# Patient Record
Sex: FEMALE | Race: WHITE | NOT HISPANIC OR LATINO | Employment: UNEMPLOYED | URBAN - METROPOLITAN AREA
[De-identification: names, ages, dates, MRNs, and addresses within clinical notes are randomized per-mention and may not be internally consistent; named-entity substitution may affect disease eponyms.]

---

## 2020-10-19 ENCOUNTER — TRANSFERRED RECORDS (OUTPATIENT)
Dept: MULTI SPECIALTY CLINIC | Facility: CLINIC | Age: 35
End: 2020-10-19

## 2020-10-19 LAB
ALT SERPL-CCNC: 23 IU/L (ref 8–45)
AST SERPL-CCNC: 24 IU/L (ref 2–40)
CHOLESTEROL (EXTERNAL): 208 MG/DL (ref 100–199)
CREATININE (EXTERNAL): 0.81 MG/DL (ref 0.57–1.11)
GFR ESTIMATED (EXTERNAL): >60 ML/MIN/1.73M2
GFR ESTIMATED (IF AFRICAN AMERICAN) (EXTERNAL): >60 ML/MIN/1.73M2
GLUCOSE (EXTERNAL): 92 MG/DL (ref 65–100)
HDLC SERPL-MCNC: 54 MG/DL
HPV ABSTRACT: NORMAL
LDL CHOLESTEROL (EXTERNAL): 135 MG/DL
NON HDL CHOLESTEROL (EXTERNAL): 154 MG/DL
PAP-ABSTRACT: NORMAL
POTASSIUM (EXTERNAL): 3.8 MMOL/L (ref 3.5–5)
TRIGLYCERIDES (EXTERNAL): 93 MG/DL
TSH SERPL-ACNC: 0.43 UIU/ML (ref 0.35–4.94)

## 2022-12-23 ENCOUNTER — OFFICE VISIT (OUTPATIENT)
Dept: FAMILY MEDICINE | Facility: CLINIC | Age: 37
End: 2022-12-23
Payer: COMMERCIAL

## 2022-12-23 VITALS
OXYGEN SATURATION: 99 % | WEIGHT: 146.3 LBS | HEIGHT: 67 IN | TEMPERATURE: 97.8 F | BODY MASS INDEX: 22.96 KG/M2 | DIASTOLIC BLOOD PRESSURE: 82 MMHG | HEART RATE: 59 BPM | SYSTOLIC BLOOD PRESSURE: 134 MMHG | RESPIRATION RATE: 14 BRPM

## 2022-12-23 DIAGNOSIS — Z13.1 SCREENING FOR DIABETES MELLITUS: ICD-10-CM

## 2022-12-23 DIAGNOSIS — Z13.220 SCREENING FOR HYPERLIPIDEMIA: ICD-10-CM

## 2022-12-23 DIAGNOSIS — Z00.00 ROUTINE GENERAL MEDICAL EXAMINATION AT A HEALTH CARE FACILITY: Primary | ICD-10-CM

## 2022-12-23 DIAGNOSIS — M35.00 SJOGREN'S SYNDROME, WITH UNSPECIFIED ORGAN INVOLVEMENT (H): ICD-10-CM

## 2022-12-23 DIAGNOSIS — E89.0 POSTSURGICAL HYPOTHYROIDISM: ICD-10-CM

## 2022-12-23 LAB
ALBUMIN SERPL-MCNC: 4.4 G/DL (ref 3.4–5)
ALP SERPL-CCNC: 49 U/L (ref 40–150)
ALT SERPL W P-5'-P-CCNC: 22 U/L (ref 0–50)
ANION GAP SERPL CALCULATED.3IONS-SCNC: 5 MMOL/L (ref 3–14)
AST SERPL W P-5'-P-CCNC: 18 U/L (ref 0–45)
BILIRUB SERPL-MCNC: 0.6 MG/DL (ref 0.2–1.3)
BUN SERPL-MCNC: 10 MG/DL (ref 7–30)
CALCIUM SERPL-MCNC: 9.8 MG/DL (ref 8.5–10.1)
CHLORIDE BLD-SCNC: 107 MMOL/L (ref 94–109)
CHOLEST SERPL-MCNC: 170 MG/DL
CO2 SERPL-SCNC: 30 MMOL/L (ref 20–32)
CREAT SERPL-MCNC: 0.7 MG/DL (ref 0.52–1.04)
FASTING STATUS PATIENT QL REPORTED: NO
GFR SERPL CREATININE-BSD FRML MDRD: >90 ML/MIN/1.73M2
GLUCOSE BLD-MCNC: 91 MG/DL (ref 70–99)
HDLC SERPL-MCNC: 65 MG/DL
LDLC SERPL CALC-MCNC: 86 MG/DL
NONHDLC SERPL-MCNC: 105 MG/DL
POTASSIUM BLD-SCNC: 3.7 MMOL/L (ref 3.4–5.3)
PROT SERPL-MCNC: 7.4 G/DL (ref 6.8–8.8)
SODIUM SERPL-SCNC: 142 MMOL/L (ref 133–144)
T4 FREE SERPL-MCNC: 1.24 NG/DL (ref 0.76–1.46)
TRIGL SERPL-MCNC: 97 MG/DL
TSH SERPL DL<=0.005 MIU/L-ACNC: 0.32 MU/L (ref 0.4–4)

## 2022-12-23 PROCEDURE — 84439 ASSAY OF FREE THYROXINE: CPT | Performed by: PHYSICIAN ASSISTANT

## 2022-12-23 PROCEDURE — 36415 COLL VENOUS BLD VENIPUNCTURE: CPT | Performed by: PHYSICIAN ASSISTANT

## 2022-12-23 PROCEDURE — 99385 PREV VISIT NEW AGE 18-39: CPT | Performed by: PHYSICIAN ASSISTANT

## 2022-12-23 PROCEDURE — 80061 LIPID PANEL: CPT | Performed by: PHYSICIAN ASSISTANT

## 2022-12-23 PROCEDURE — 84443 ASSAY THYROID STIM HORMONE: CPT | Performed by: PHYSICIAN ASSISTANT

## 2022-12-23 PROCEDURE — 80053 COMPREHEN METABOLIC PANEL: CPT | Performed by: PHYSICIAN ASSISTANT

## 2022-12-23 RX ORDER — MULTIVIT-MIN/IRON/FOLIC ACID/K 18-600-40
CAPSULE ORAL
COMMUNITY

## 2022-12-23 RX ORDER — LEVOTHYROXINE SODIUM 125 UG/1
125 TABLET ORAL DAILY
COMMUNITY
End: 2022-12-24 | Stop reason: DRUGHIGH

## 2022-12-23 ASSESSMENT — ENCOUNTER SYMPTOMS
ABDOMINAL PAIN: 0
EYE PAIN: 0
JOINT SWELLING: 0
NERVOUS/ANXIOUS: 0
DIARRHEA: 0
HEADACHES: 1
COUGH: 0
WEAKNESS: 0
FEVER: 0
MYALGIAS: 0
PALPITATIONS: 0
NAUSEA: 0
DIZZINESS: 0
ARTHRALGIAS: 0
FREQUENCY: 0
HEARTBURN: 0
PARESTHESIAS: 0
SHORTNESS OF BREATH: 0
CHILLS: 0
CONSTIPATION: 0
BREAST MASS: 0
SORE THROAT: 0
HEMATOCHEZIA: 0
DYSURIA: 0
HEMATURIA: 0

## 2022-12-23 ASSESSMENT — PAIN SCALES - GENERAL: PAINLEVEL: NO PAIN (0)

## 2022-12-23 NOTE — PATIENT INSTRUCTIONS
I will send a letter with lab results and refill your thyroid medication at that time  Patient Education      Preventive Health Recommendations  Female Ages 26 - 39  Yearly exam:   See your health care provider every year in order to  Review health changes.   Discuss preventive care.    Review your medicines if you your doctor has prescribed any.    Until age 30: Get a Pap test every three years (more often if you have had an abnormal result).    After age 30: Talk to your doctor about whether you should have a Pap test every 3 years or have a Pap test with HPV screening every 5 years.   You do not need a Pap test if your uterus was removed (hysterectomy) and you have not had cancer.  You should be tested each year for STDs (sexually transmitted diseases), if you're at risk.   Talk to your provider about how often to have your cholesterol checked.  If you are at risk for diabetes, you should have a diabetes test (fasting glucose).  Shots: Get a flu shot each year. Get a tetanus shot every 10 years.   Nutrition:   Eat at least 5 servings of fruits and vegetables each day.  Eat whole-grain bread, whole-wheat pasta and brown rice instead of white grains and rice.  Get adequate Calcium and Vitamin D.     Lifestyle  Exercise at least 150 minutes a week (30 minutes a day, 5 days of the week). This will help you control your weight and prevent disease.  Limit alcohol to one drink per day.  No smoking.   Wear sunscreen to prevent skin cancer.  See your dentist every six months for an exam and cleaning.

## 2022-12-23 NOTE — PROGRESS NOTES
SUBJECTIVE:   CC: Marlee is an 37 year old who presents for preventive health visit.     Patient has been advised of split billing requirements and indicates understanding: Yes  Healthy Habits:     Getting at least 3 servings of Calcium per day:  Yes    Bi-annual eye exam:  NO    Dental care twice a year:  Yes    Sleep apnea or symptoms of sleep apnea:  None    Diet:  Gluten-free/reduced    Frequency of exercise:  6-7 days/week    Duration of exercise:  15-30 minutes    Taking medications regularly:  Yes    Medication side effects:  Not applicable    PHQ-2 Total Score: 0    Additional concerns today:  Yes      Pap smear done on this date: 10/19/2020 (approximately), by this group: [facility], results were NIL and HPV negative .             Today's PHQ-2 Score:   PHQ-2 ( 1999 [employer]) 12/23/2022   Q1: Little interest or pleasure in doing things Not at all   Q2: Feeling down, depressed or hopeless Not at all   PHQ-2 Score 0       Have you ever done Advance Care Planning? (For example, a Health Directive, POLST, or a discussion with a medical provider or your loved ones about your wishes): No, advance care planning information given to patient to review.  Patient declined advance care planning discussion at this time.    Social History     Tobacco Use     Smoking status: Not on file     Smokeless tobacco: Not on file   Substance Use Topics     Alcohol use: Not on file         Alcohol Use 12/23/2022   Prescreen: >3 drinks/day or >7 drinks/week? Not Applicable       Reviewed orders with patient.  Reviewed health maintenance and updated orders accordingly - Yes  BP Readings from Last 3 Encounters:   12/23/22 134/82    Wt Readings from Last 3 Encounters:   12/23/22 66.4 kg (146 lb 4.8 oz)                  Patient Active Problem List   Diagnosis     Postsurgical hypothyroidism     Sjogren's syndrome (H)     Past Surgical History:   Procedure Laterality Date     AS RAD RESEC TONSIL/PILLARS       TUBAL LIGATION  2015      wisdom teeth         Social History     Tobacco Use     Smoking status: Never     Smokeless tobacco: Never   Substance Use Topics     Alcohol use: Never     Family History   Problem Relation Age of Onset     Hypothyroidism Mother      Hashimoto's thyroiditis Father      Multiple Sclerosis Brother      Cushing syndrome Brother      Lung Cancer Brother      Thyroid Disease Brother      Thyroid Disease Brother      Raynaud syndrome Brother      Coronary Artery Disease Maternal Uncle         heart attacks in 60s     Celiac Disease Daughter      Colon Cancer No family hx of      Diabetes No family hx of      Breast Cancer No family hx of          Current Outpatient Medications   Medication Sig Dispense Refill     levothyroxine (SYNTHROID/LEVOTHROID) 125 MCG tablet Take 125 mcg by mouth daily       Vitamin D, Cholecalciferol, 25 MCG (1000 UT) TABS 2 once a day         Breast Cancer Screening:  Any new diagnosis of family breast, ovarian, or bowel cancer? No    FHS-7: No flowsheet data found.    Patient under 40 years of age: Routine Mammogram Screening not recommended.   Pertinent mammograms are reviewed under the imaging tab.    History of abnormal Pap smear: NO - age 30-65 PAP every 5 years with negative HPV co-testing recommended     Reviewed and updated as needed this visit by clinical staff   Tobacco  Allergies  Meds   Med Hx  Surg Hx  Fam Hx  Soc Hx        Reviewed and updated as needed this visit by Provider   Tobacco   Meds   Med Hx  Surg Hx  Fam Hx  Soc Hx       Father with hashimoto's with very enlarged thyroid requiring surgery so all family members  Checked.  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   HENT: Negative for congestion, ear pain, hearing loss and sore throat.    Eyes: Negative for pain and visual disturbance.   Respiratory: Negative for cough and shortness of breath.    Cardiovascular: Negative for chest pain, palpitations and peripheral edema.   Gastrointestinal: Negative for abdominal pain, constipation, diarrhea, heartburn, hematochezia and nausea.   Breasts:  Negative for tenderness, breast mass and discharge.   Genitourinary: Positive for vaginal bleeding. Negative for dysuria, frequency, genital sores, hematuria, pelvic pain, urgency and vaginal discharge.   Musculoskeletal: Negative for arthralgias, joint swelling and myalgias.   Skin: Negative for rash.   Neurological: Positive for headaches. Negative for dizziness, weakness and paresthesias.   Psychiatric/Behavioral: Negative for mood changes. The patient is not nervous/anxious.           OBJECTIVE:   /82 (BP Location: Right arm, Patient Position: Sitting, Cuff Size: Adult Regular)   Pulse 59   Temp 97.8  F (36.6  C) (Oral)   Resp 14   Ht 1.7 m (5' 6.93\")   Wt 66.4 kg (146 lb 4.8 oz)   LMP 12/22/2022 (Exact Date)   SpO2 99%   BMI 22.96 kg/m    Physical Exam  GENERAL: healthy, alert and no distress  EYES: Eyes grossly normal to inspection, PERRL and conjunctivae and sclerae normal  HENT: ear canals and TM's normal, nose and mouth without ulcers or lesions  NECK: no adenopathy, no asymmetry, masses, or scars and thyroid normal to palpation  RESP: lungs clear to auscultation - no rales, rhonchi or " wheezes  BREAST: normal without masses, tenderness or nipple discharge and no palpable axillary masses or adenopathy  CV: regular rate and rhythm, normal S1 S2, no S3 or S4, no murmur, click or rub, no peripheral edema and peripheral pulses strong  ABDOMEN: soft, nontender, no hepatosplenomegaly, no masses and bowel sounds normal   (female): deferred  MS: no gross musculoskeletal defects noted, no edema  SKIN: no suspicious lesions or rashes  NEURO: Normal strength and tone, mentation intact and speech normal  PSYCH: mentation appears normal, affect normal/bright    Diagnostic Test Results:  none     ASSESSMENT/PLAN:   (Z00.00) Routine general medical examination at a health care facility  (primary encounter diagnosis)  Comment: benign exam  Headache declines neurology at this time  Plan: labs pending.  Declines HPV and HIV   Declines covid booster -had covid four times     (E89.0) Postsurgical hypothyroidism  Comment: tsh pending- will refill medications once labs back   A little too much thyroid medication.  Prescription for 112 mcg sent and recheck labs in 6-8 weeks-see result note   Plan: TSH with free T4 reflex            (M35.00) Sjogren's syndrome, with unspecified organ involvement (H)  Comment: history of Lupus Features declines rheumatology  Plan:     (Z13.1) Screening for diabetes mellitus  Comment:   Plan: Comprehensive metabolic panel (BMP + Alb, Alk         Phos, ALT, AST, Total. Bili, TP)            (Z13.220) Screening for hyperlipidemia  Comment:   Plan: Lipid panel reflex to direct LDL Non-fasting              Patient has been advised of split billing requirements and indicates understanding: Yes      COUNSELING:  Reviewed preventive health counseling, as reflected in patient instructions       Regular exercise       Healthy diet/nutrition       Vision screening       Immunizations    Declined: Covid-19 due to Concerns about side effects/safety               Consider Hep C screening for all  patients one time for ages 18-79 years       HIV screeninx in teen years, 1x in adult years, and at intervals if high risk        She reports that she has never smoked. She has never used smokeless tobacco.          Lyudmila Salas PA-C  [facility]

## 2022-12-23 NOTE — Clinical Note
Please abstract the following data from this visit with this patient into the appropriate field in Epic:  Tests that can be patient reported without a hard copy:  {Quality Abstract List (Optional):469495}  Other Tests found in the patient's chart through Chart Review/Care Everywhere:  Pap smear done by this group [facility] this date: 10/19/2020 and HPV done by this group [facility] on this date: 10/19/2020  Note to [employer]st[employer]: If this section is blank, no results were found via Chart Review/Care Everywhere. Found in care everywhere

## 2022-12-24 RX ORDER — LEVOTHYROXINE SODIUM 112 UG/1
112 TABLET ORAL DAILY
Qty: 30 TABLET | Refills: 1 | Status: SHIPPED | OUTPATIENT
Start: 2022-12-24 | End: 2023-02-18

## 2022-12-26 ENCOUNTER — TELEPHONE (OUTPATIENT)
Dept: FAMILY MEDICINE | Facility: CLINIC | Age: 37
End: 2022-12-26

## 2022-12-26 NOTE — TELEPHONE ENCOUNTER
Called patient and relayed provider's message below.   Provided TSH and T4 levels per patient request. Informed prescriptions for new dosage has been sent to [facility] pharmacy in [place]. Patient verbalized understanding and denies further questions at this time.     JAYNE Merino  [facility]      ----- Message -----  From: Lyudmila Salas PA-C  Sent: 12/24/2022   7:21 AM CST  To: [facility]    Please call with lab results  Dear Marlee  Your thyroid function shows that we have you on a bit too much thyroid medication.  I recommend decreasing to 112mcg daily and recheck labs in 6-8 weeks - you may schedule a lab only appointment.  Your cholesterol looks great.  Your electrolytes, blood sugar, kidney function and liver function were normal.     Please call or MyChart my office with any questions or concerns.   Lyudmila Salas, PAC

## 2023-02-17 ENCOUNTER — LAB (OUTPATIENT)
Dept: LAB | Facility: CLINIC | Age: 38
End: 2023-02-17
Payer: COMMERCIAL

## 2023-02-17 DIAGNOSIS — E89.0 POSTSURGICAL HYPOTHYROIDISM: ICD-10-CM

## 2023-02-17 LAB — TSH SERPL DL<=0.005 MIU/L-ACNC: 0.81 MU/L (ref 0.4–4)

## 2023-02-17 PROCEDURE — 36415 COLL VENOUS BLD VENIPUNCTURE: CPT

## 2023-02-17 PROCEDURE — 84443 ASSAY THYROID STIM HORMONE: CPT

## 2023-02-17 NOTE — LETTER
February 20, 2023      Marlee Lloyd  [address]  [place] [address]        Dear Ms.Van Salazar,    We are writing to inform you of your test results.    Dear Marlee   Your thyroid function was normal.     Continue levothyroxine at current dose.   Please call or MyChart my office with any questions or concerns.         Resulted Orders   TSH with free T4 reflex   Result Value Ref Range    TSH 0.81 0.40 - 4.00 mU/L       If you have any questions or concerns, please call the clinic at the number listed above.       Sincerely,      Lyudmila Salas PA-C

## 2023-02-18 RX ORDER — LEVOTHYROXINE SODIUM 112 UG/1
TABLET ORAL
Qty: 90 TABLET | Refills: 3 | Status: SHIPPED | OUTPATIENT
Start: 2023-02-18 | End: 2024-02-22

## 2023-02-18 NOTE — RESULT ENCOUNTER NOTE
Please send letter with lab results     Dear Marlee  Your thyroid function was normal.    Continue levothyroxine at current dose.  Please call or MyChart my office with any questions or concerns.   Lyudmila Salas, PAC

## 2023-02-20 ENCOUNTER — TELEPHONE (OUTPATIENT)
Dept: FAMILY MEDICINE | Facility: CLINIC | Age: 38
End: 2023-02-20
Payer: COMMERCIAL

## 2023-02-20 NOTE — TELEPHONE ENCOUNTER
Patient calling requesting to know lab results from 2/17/23. RN read provider result note to her and provided her with her TSH level of 0.81. Patient verbalized understanding. No further questions or concerns at this time.     Ellyn Franco RN    [facility]

## 2023-09-12 ENCOUNTER — NURSE TRIAGE (OUTPATIENT)
Dept: NURSING | Facility: CLINIC | Age: 38
End: 2023-09-12
Payer: COMMERCIAL

## 2023-09-12 NOTE — TELEPHONE ENCOUNTER
Eye infection, reacting to antibiotic ordered by [facility]. Can't get a hold of anyone to get antibiotic changed, inquiring on what to do. Writer advised that she either [facility] and ask [facility] to [facility] it or to be reassessed in [facility] and ask them to prescribe an alternative.  Patient verbalized understanding of care advice.  Alyssa Melchor RN on 9/12/2023 at 4:59 PM    Reason for Disposition    [1] Follow-up call to recent contact AND [2] information only call, no triage required    Protocols used: Information Only Call - No Triage-A-

## 2024-02-20 ENCOUNTER — OFFICE VISIT (OUTPATIENT)
Dept: FAMILY MEDICINE | Facility: CLINIC | Age: 39
End: 2024-02-20
Payer: COMMERCIAL

## 2024-02-20 VITALS
BODY MASS INDEX: 22.28 KG/M2 | TEMPERATURE: 97.1 F | HEART RATE: 67 BPM | OXYGEN SATURATION: 99 % | SYSTOLIC BLOOD PRESSURE: 124 MMHG | HEIGHT: 68 IN | DIASTOLIC BLOOD PRESSURE: 81 MMHG | WEIGHT: 147 LBS | RESPIRATION RATE: 18 BRPM

## 2024-02-20 DIAGNOSIS — M79.672 LEFT FOOT PAIN: ICD-10-CM

## 2024-02-20 DIAGNOSIS — Z13.21 ENCOUNTER FOR VITAMIN DEFICIENCY SCREENING: ICD-10-CM

## 2024-02-20 DIAGNOSIS — Z00.00 ROUTINE GENERAL MEDICAL EXAMINATION AT A HEALTH CARE FACILITY: Primary | ICD-10-CM

## 2024-02-20 DIAGNOSIS — M35.00 SJOGREN'S SYNDROME, WITH UNSPECIFIED ORGAN INVOLVEMENT (H): ICD-10-CM

## 2024-02-20 DIAGNOSIS — T78.40XA ALLERGIC REACTION, INITIAL ENCOUNTER: ICD-10-CM

## 2024-02-20 DIAGNOSIS — M32.9 SYSTEMIC LUPUS ERYTHEMATOSUS, UNSPECIFIED SLE TYPE, UNSPECIFIED ORGAN INVOLVEMENT STATUS (H): ICD-10-CM

## 2024-02-20 DIAGNOSIS — Z13.220 SCREENING FOR HYPERLIPIDEMIA: ICD-10-CM

## 2024-02-20 DIAGNOSIS — Z12.31 VISIT FOR SCREENING MAMMOGRAM: ICD-10-CM

## 2024-02-20 DIAGNOSIS — E89.0 POSTSURGICAL HYPOTHYROIDISM: ICD-10-CM

## 2024-02-20 DIAGNOSIS — Z13.1 SCREENING FOR DIABETES MELLITUS: ICD-10-CM

## 2024-02-20 LAB
ALBUMIN SERPL BCG-MCNC: 4.6 G/DL (ref 3.5–5.2)
ALP SERPL-CCNC: 46 U/L (ref 40–150)
ALT SERPL W P-5'-P-CCNC: 18 U/L (ref 0–50)
ANION GAP SERPL CALCULATED.3IONS-SCNC: 11 MMOL/L (ref 7–15)
AST SERPL W P-5'-P-CCNC: 22 U/L (ref 0–45)
BILIRUB SERPL-MCNC: 0.7 MG/DL
BUN SERPL-MCNC: 13.3 MG/DL (ref 6–20)
CALCIUM SERPL-MCNC: 9.3 MG/DL (ref 8.6–10)
CHLORIDE SERPL-SCNC: 104 MMOL/L (ref 98–107)
CHOLEST SERPL-MCNC: 194 MG/DL
CREAT SERPL-MCNC: 0.81 MG/DL (ref 0.51–0.95)
DEPRECATED HCO3 PLAS-SCNC: 25 MMOL/L (ref 22–29)
EGFRCR SERPLBLD CKD-EPI 2021: >90 ML/MIN/1.73M2
FASTING STATUS PATIENT QL REPORTED: YES
GLUCOSE SERPL-MCNC: 84 MG/DL (ref 70–99)
HDLC SERPL-MCNC: 57 MG/DL
LDLC SERPL CALC-MCNC: 121 MG/DL
NONHDLC SERPL-MCNC: 137 MG/DL
POTASSIUM SERPL-SCNC: 3.9 MMOL/L (ref 3.4–5.3)
PROT SERPL-MCNC: 6.9 G/DL (ref 6.4–8.3)
SODIUM SERPL-SCNC: 140 MMOL/L (ref 135–145)
TRIGL SERPL-MCNC: 82 MG/DL
TSH SERPL DL<=0.005 MIU/L-ACNC: 1.42 UIU/ML (ref 0.3–4.2)

## 2024-02-20 PROCEDURE — 84443 ASSAY THYROID STIM HORMONE: CPT | Performed by: PHYSICIAN ASSISTANT

## 2024-02-20 PROCEDURE — 82306 VITAMIN D 25 HYDROXY: CPT | Performed by: PHYSICIAN ASSISTANT

## 2024-02-20 PROCEDURE — 99213 OFFICE O/P EST LOW 20 MIN: CPT | Mod: 25 | Performed by: PHYSICIAN ASSISTANT

## 2024-02-20 PROCEDURE — 80053 COMPREHEN METABOLIC PANEL: CPT | Performed by: PHYSICIAN ASSISTANT

## 2024-02-20 PROCEDURE — 86003 ALLG SPEC IGE CRUDE XTRC EA: CPT | Performed by: PHYSICIAN ASSISTANT

## 2024-02-20 PROCEDURE — 90686 IIV4 VACC NO PRSV 0.5 ML IM: CPT | Performed by: PHYSICIAN ASSISTANT

## 2024-02-20 PROCEDURE — 36415 COLL VENOUS BLD VENIPUNCTURE: CPT | Performed by: PHYSICIAN ASSISTANT

## 2024-02-20 PROCEDURE — 99396 PREV VISIT EST AGE 40-64: CPT | Mod: 25 | Performed by: PHYSICIAN ASSISTANT

## 2024-02-20 PROCEDURE — 90471 IMMUNIZATION ADMIN: CPT | Performed by: PHYSICIAN ASSISTANT

## 2024-02-20 PROCEDURE — 80061 LIPID PANEL: CPT | Performed by: PHYSICIAN ASSISTANT

## 2024-02-20 RX ORDER — MULTIVITAMIN
1 TABLET ORAL DAILY
COMMUNITY

## 2024-02-20 SDOH — HEALTH STABILITY: PHYSICAL HEALTH: ON AVERAGE, HOW MANY DAYS PER WEEK DO YOU ENGAGE IN MODERATE TO STRENUOUS EXERCISE (LIKE A BRISK WALK)?: 5 DAYS

## 2024-02-20 ASSESSMENT — PAIN SCALES - GENERAL: PAINLEVEL: NO PAIN (0)

## 2024-02-20 ASSESSMENT — SOCIAL DETERMINANTS OF HEALTH (SDOH): HOW OFTEN DO YOU GET TOGETHER WITH FRIENDS OR RELATIVES?: THREE TIMES A WEEK

## 2024-02-20 NOTE — PROGRESS NOTES
Prior to immunization administration, verified patients identity using patient s name and date of birth. Please see Immunization Activity for additional information.     Screening Questionnaire for Adult Immunization    Are you sick today?   No   Do you have allergies to medications, food, a vaccine component or latex?   Yes   Have you ever had a serious reaction after receiving a vaccination?   No   Do you have a long-term health problem with heart, lung, kidney, or metabolic disease (e.g., diabetes), asthma, a blood disorder, no spleen, complement component deficiency, a cochlear implant, or a spinal fluid leak?  Are you on long-term aspirin therapy?   No   Do you have cancer, leukemia, HIV/AIDS, or any other immune system problem?   No   Do you have a parent, brother, or sister with an immune system problem?   No   In the past 3 months, have you taken medications that affect  your immune system, such as prednisone, other steroids, or anticancer drugs; drugs for the treatment of rheumatoid arthritis, Crohn s disease, or psoriasis; or have you had radiation treatments?   No   Have you had a seizure, or a brain or other nervous system problem?   No   During the past year, have you received a transfusion of blood or blood    products, or been given immune (gamma) globulin or antiviral drug?   No   For women: Are you pregnant or is there a chance you could become       pregnant during the next month?   No   Have you received any vaccinations in the past 4 weeks?   No     Immunization questionnaire was positive for at least one answer.  Notified Lyudmila Salas.      Patient instructed to remain in clinic for 15 minutes afterwards, and to report any adverse reactions.     Screening performed by Bobbi Hennessy MA on 2/20/2024 at 8:12 AM.

## 2024-02-20 NOTE — PATIENT INSTRUCTIONS
I will notify you of lab results via [employer].  If you have difficulties with [employer] call [phone].  Schedule mammogram at [facility] ([phone]) formerly called [facility].   Follow up with podiatry for left foot pain  Patient Education    Preventive Care Advice   This is general advice given by our system to help you stay healthy. However, your care team may have specific advice just for you. Please talk to your care team about your preventive care needs.  Nutrition  Eat 5 or more servings of fruits and vegetables each day.  Try wheat bread, brown rice and whole grain pasta (instead of white bread, rice, and pasta).  Get enough calcium and vitamin D. Check the label on foods and aim for 100% of the RDA (recommended daily allowance).  Lifestyle  Exercise at least 150 minutes each week  (30 minutes a day, 5 days a week).  Do muscle strengthening activities 2 days a week. These help control your weight and prevent disease.  No smoking.  Wear sunscreen to prevent skin cancer.  Have a dental exam and cleaning every 6 months.  Yearly exams  See your health care team every year to talk about:  Any changes in your health.  Any medicines your care team has prescribed.  Preventive care, family planning, and ways to prevent chronic diseases.  Shots (vaccines)   HPV shots (up to age 26), if you've never had them before.  Hepatitis B shots (up to age 59), if you've never had them before.  COVID-19 shot: Get this shot when it's due.  Flu shot: Get a flu shot every year.  Tetanus shot: Get a tetanus shot every 10 years.  Pneumococcal, hepatitis A, and RSV shots: Ask your care team if you need these based on your risk.  Shingles shot (for age 50 and up)  General health tests  Diabetes screening:  Starting at age 35, Get screened for diabetes at least every 3 years.  If you are younger than age 35, ask your care team if you should be screened for diabetes.  Cholesterol test: At age 39,  start having a cholesterol test every 5 years, or more often if advised.  Bone density scan (DEXA): At age 50, ask your care team if you should have this scan for osteoporosis (brittle bones).  Hepatitis C: Get tested at least once in your life.  STIs (sexually transmitted infections)  Before age 24: Ask your care team if you should be screened for STIs.  After age 24: Get screened for STIs if you're at risk. You are at risk for STIs (including HIV) if:  You are sexually active with more than one person.  You don't use condoms every time.  You or a partner was diagnosed with a sexually transmitted infection.  If you are at risk for HIV, ask about PrEP medicine to prevent HIV.  Get tested for HIV at least once in your life, whether you are at risk for HIV or not.  Cancer screening tests  Cervical cancer screening: If you have a cervix, begin getting regular cervical cancer screening tests starting at age 21.  Breast cancer scan (mammogram): If you've ever had breasts, begin having regular mammograms starting at age 40. This is a scan to check for breast cancer.  Colon cancer screening: It is important to start screening for colon cancer at age 45.  Have a colonoscopy test every 10 years (or more often if you're at risk) Or, ask your provider about stool tests like a FIT test every year or Cologuard test every 3 years.  To learn more about your testing options, visit:   https://www.[employer]Proof/179606.pdf.  For help making a decision, visit:   https://bit.ly/jg50061.  Prostate cancer screening test: If you have a prostate, ask your care team if a prostate cancer screening test (PSA) at age 55 is right for you.  Lung cancer screening: If you are a current or former smoker ages 50 to 80, ask your care team if ongoing lung cancer screenings are right for you.  For informational purposes only. Not to replace the advice of your health care provider. Copyright   2023 [employer]. All rights reserved. Clinically  reviewed by the [facility] Transitions Program. [employer] 757746 - REV 01/24.

## 2024-02-20 NOTE — PROGRESS NOTES
Preventive Care Visit  [facility]  Lyudmila Salas PA-C, Family Medicine  Feb 20, 2024    Assessment & Plan     Routine general medical examination at a health care facility  Normal exam     Visit for screening mammogram  Encouraged to schedule mammogram   - MA SCREENING DIGITAL BILAT - Future  (s+30)    Postsurgical hypothyroidism  Currently taking levothyroxine 112 mcg daily   - TSH WITH FREE T4 REFLEX  - TSH WITH FREE T4 REFLEX    Encounter for vitamin deficiency screening    - 25 OH Vit D therapy monitoring  - 25 OH Vit D therapy monitoring    Screening for hyperlipidemia    - Lipid panel reflex to direct LDL Fasting  - Lipid panel reflex to direct LDL Fasting    Allergic reaction, initial encounter  Rule out shellfish, fish and other food allergies   Declines epipen- symptoms resolved with benadryl  - Allergen clam IgE  - Allergen crab IgE  - Allergen lobster IgE  - Allergen oyster IgE  - Allergen scallop IgE  - Allergen shrimp IgE  - Allergy adult food panel  - Allergen codfish IgE  - Allergen clam IgE  - Allergen crab IgE  - Allergen lobster IgE  - Allergen oyster IgE  - Allergen scallop IgE  - Allergen shrimp IgE  - Allergy adult food panel  - Allergen codfish IgE    Screening for diabetes mellitus    - Comprehensive metabolic panel  - Comprehensive metabolic panel    Left foot pain  Follow up with podiatry   - Orthopedic  Referral    Systemic lupus erythematosus, unspecified SLE type, unspecified organ involvement status (H)  Not on any medications - declines rheum      Sjogren's syndrome, with unspecified organ involvement (H24)  Not on any medications- declines follow up with rheum      Ordering of each unique test        Counseling  Appropriate preventive services were discussed with this patient, including applicable screening as appropriate for fall prevention, nutrition, physical activity, Tobacco-use cessation, weight loss and cognition.  Checklist reviewing  preventive services available has been given to the patient.  Reviewed patient's diet, addressing concerns and/or questions.         I will notify you of lab results via [employer].  If you have difficulties with MyChart call [phone].  Schedule mammogram at [facility] ([phone]) formerly called [facility].   Follow up with podiatry for left foot pain    Subjective   Marlee is a 38 year old, presenting for the following:  Physical (Patient would like to get thyroid test. Discuss possible shelf fish allergie. )        2/20/2024     7:39 AM   Additional Questions   Roomed by Laura   Accompanied by self        Health Care Directive  Patient does not have a Health Care Directive or Living Will: Discussed advance care planning with patient; however, patient declined at this time.    HPI  Patient known to me with history of Sjogren's, Lupus and postsurgical hypothyroidism presents for annual exam as well as concerns for allergies and foot pain. Last few years when eating grouper or salmon- face gets hot-benadryl helps   Did eat some Scallops and ears closed up and throat swelled and had to take benadryl next day as well because still symptomatic.  Severe allergic reaction to shellfish   Has an accessory Bone in left foot. Tender to touch but if walks for extended period of time- liming and pain with walking for over a month    Reports that she Has had some weight changes and dry skin  Wt Readings from Last 4 Encounters:   02/20/24 66.7 kg (147 lb)   12/23/22 66.4 kg (146 lb 4.8 oz)                No data to display                  12/23/2022   Nutrition   Three or more servings of calcium each day? Yes   Diet: gluten- free/reduced         12/23/2022   Exercise   Frequency of exercise: 6-7 days/week            12/23/2022   Dental   Dentist two times every year? Yes          No data to display                    Today's PHQ-2 Score:       2/20/2024     7:37 AM   PHQ-2 ( 1999 [employer])    Q1: Little interest or pleasure in doing things 0   Q2: Feeling down, depressed or hopeless 0   PHQ-2 Score 0   Q1: Little interest or pleasure in doing things Not at all   Q2: Feeling down, depressed or hopeless Not at all   PHQ-2 Score 0         12/23/2022   Substance Use   Alcohol more than 3/day or more than 7/wk Not Applicable     Social History     Tobacco Use    Smoking status: Never    Smokeless tobacco: Never   Vaping Use    Vaping Use: Never used   Substance Use Topics    Alcohol use: Never    Drug use: Never          Mammogram Screening - Mammogram every 1-2 years updated in Health Maintenance based on mutual decision making      History of abnormal Pap smear: NO - age 21-29 PAP every 3 years recommended        10/19/2020     8:22 AM   PAP / HPV   PAP-ABSTRACT See Scanned Document           This result is from an external source.     The 10-year ASCVD risk score (Mino SUMMERS, et al., 2019) is: 0.3%    Values used to calculate the score:      Age: 38 years      Sex: Female      Is Non- : No      Diabetic: No      Tobacco smoker: No      Systolic Blood Pressure: 124 mmHg      Is BP treated: No      HDL Cholesterol: 65 mg/dL      Total Cholesterol: 170 mg/dL       Reviewed and updated as needed this visit by Provider                    BP Readings from Last 3 Encounters:   02/20/24 124/81   12/23/22 134/82    Wt Readings from Last 3 Encounters:   02/20/24 66.7 kg (147 lb)   12/23/22 66.4 kg (146 lb 4.8 oz)                  Patient Active Problem List   Diagnosis    Postsurgical hypothyroidism    Sjogren's syndrome (H24)    Systemic lupus erythematosus, unspecified SLE type, unspecified organ involvement status (H)     Past Surgical History:   Procedure Laterality Date    AS RAD RESEC TONSIL/PILLARS      TUBAL LIGATION  2015    wisdom teeth         Social History     Tobacco Use    Smoking status: Never    Smokeless tobacco: Never   Substance Use Topics    Alcohol use: Never     Family  "History   Problem Relation Age of Onset    Hypothyroidism Mother     Hashimoto's thyroiditis Father     Multiple Sclerosis Brother     Cushing syndrome Brother     Lung Cancer Brother     Thyroid Disease Brother     Thyroid Disease Brother     Raynaud syndrome Brother     Coronary Artery Disease Maternal Uncle         heart attacks in 60s    Celiac Disease Daughter     Colon Cancer No family hx of     Diabetes No family hx of     Breast Cancer No family hx of          Current Outpatient Medications   Medication Sig Dispense Refill    levothyroxine (SYNTHROID/LEVOTHROID) 112 MCG tablet TAKE 1 TABLET BY MOUTH DAILY 90 tablet 3    multivitamin w/minerals (MULTI-VITAMIN) tablet Take 1 tablet by mouth daily      Vitamin D, Cholecalciferol, 25 MCG (1000 UT) TABS 2 once a day           Review of Systems  CONSTITUTIONAL:reports some weight gain   INTEGUMENTARY/SKIN: NEGATIVE for worrisome rashes, moles or lesions  EYES: NEGATIVE for vision changes or irritation  ENT/MOUTH: NEGATIVE for ear, mouth and throat problems  RESP: NEGATIVE for significant cough or SOB  BREAST: NEGATIVE for masses, tenderness or discharge  CV: NEGATIVE for chest pain, palpitations or peripheral edema  GI: NEGATIVE for nausea, abdominal pain, heartburn, or change in bowel habits  : NEGATIVE for frequency, dysuria, or hematuria  MUSCULOSKELETAL: NEGATIVE for significant arthralgias or myalgia except left foot with pain- see above   NEURO: NEGATIVE for weakness, dizziness or paresthesias  ENDOCRINE: NEGATIVE for temperature intolerance, skin/hair changes  HEME: NEGATIVE for bleeding problems  PSYCHIATRIC: NEGATIVE for changes in mood or affect     Objective    Exam  /81 (BP Location: Right arm, Patient Position: Sitting, Cuff Size: Adult Regular)   Pulse 67   Temp 97.1  F (36.2  C) (Temporal)   Resp 18   Ht 1.715 m (5' 7.5\")   Wt 66.7 kg (147 lb)   SpO2 99%   BMI 22.68 kg/m     Estimated body mass index is 22.68 kg/m  as calculated from " "the following:    Height as of this encounter: 1.715 m (5' 7.5\").    Weight as of this encounter: 66.7 kg (147 lb).    Physical Exam  GENERAL: alert and no distress  EYES: Eyes grossly normal to inspection, PERRL and conjunctivae and sclerae normal  HENT: ear canals and TM's normal, nose and mouth without ulcers or lesions  NECK: no adenopathy, no asymmetry, masses, or scars  RESP: lungs clear to auscultation - no rales, rhonchi or wheezes  BREAST: normal without masses, tenderness or nipple discharge and no palpable axillary masses or adenopathy  CV: regular rate and rhythm, normal S1 S2, no S3 or S4, no murmur, click or rub, no peripheral edema  ABDOMEN: soft, nontender, no hepatosplenomegaly, no masses and bowel sounds normal   (female): normal female external genitalia, normal urethral meatus, normal vaginal mucosa  MS: no gross musculoskeletal defects noted, no edema except left foot medial aspect there is a tender bony prominence -not present on other foot. No erythema or fluctuance   SKIN: no suspicious lesions or rashes  NEURO: Normal strength and tone, mentation intact and speech normal  PSYCH: mentation appears normal, affect normal/bright      Signed Electronically by: Lyudmila Salas PA-C    "

## 2024-02-22 ENCOUNTER — MYC REFILL (OUTPATIENT)
Dept: FAMILY MEDICINE | Facility: CLINIC | Age: 39
End: 2024-02-22
Payer: COMMERCIAL

## 2024-02-22 DIAGNOSIS — E89.0 POSTSURGICAL HYPOTHYROIDISM: ICD-10-CM

## 2024-02-22 LAB
ALMOND IGE QN: <0.1 KU(A)/L
CASHEW NUT IGE QN: <0.1 KU(A)/L
CLAM IGE QN: <0.1 KU(A)/L
CODFISH IGE QN: <0.1 KU(A)/L
CODFISH IGE QN: <0.1 KU(A)/L
COW MILK IGE QN: <0.1 KU(A)/L
CRAB IGE QN: <0.1 KU(A)/L
EGG WHITE IGE QN: <0.1 KU(A)/L
HAZELNUT IGE QN: <0.1 KU(A)/L
IGE SERPL-ACNC: 25 KU/L (ref 0–114)
LOBSTER IGE QN: <0.1 KU(A)/L
OYSTER IGE QN: <0.1 KU(A)/L
PEANUT IGE QN: <0.1 KU(A)/L
SALMON IGE QN: <0.1 KU(A)/L
SCALLOP IGE QN: <0.1 KU(A)/L
SCALLOP IGE QN: <0.1 KU(A)/L
SESAME SEED IGE QN: <0.1 KU(A)/L
SHRIMP IGE QN: <0.1 KU(A)/L
SHRIMP IGE QN: <0.1 KU(A)/L
SOYBEAN IGE QN: <0.1 KU(A)/L
TUNA IGE QN: <0.1 KU(A)/L
WALNUT IGE QN: <0.1 KU(A)/L
WHEAT IGE QN: <0.1 KU(A)/L

## 2024-02-22 RX ORDER — LEVOTHYROXINE SODIUM 112 UG/1
112 TABLET ORAL DAILY
Qty: 90 TABLET | Refills: 3 | Status: SHIPPED | OUTPATIENT
Start: 2024-02-22

## 2024-02-22 RX ORDER — LEVOTHYROXINE SODIUM 112 UG/1
112 TABLET ORAL DAILY
Qty: 90 TABLET | Refills: 3 | OUTPATIENT
Start: 2024-02-22

## 2024-02-22 NOTE — RESULT ENCOUNTER NOTE
Dear Marlee  Your cholesterol looks good.  All of your allergy tests were negative.   Your thyroid function was normal.  Your electrolytes, blood sugar, kidney function and liver function were normal.    Your vitamin D level is still in process.  Please call or MyChart my office with any questions or concerns.   JOSE E Henderson      The 10-year ASCVD risk score (Mino SUMMERS, et al., 2019) is: 0.5%    Values used to calculate the score:      Age: 38 years      Sex: Female      Is Non- : No      Diabetic: No      Tobacco smoker: No      Systolic Blood Pressure: 124 mmHg      Is BP treated: No      HDL Cholesterol: 57 mg/dL      Total Cholesterol: 194 mg/dL

## 2024-02-24 LAB
DEPRECATED CALCIDIOL+CALCIFEROL SERPL-MC: <79 UG/L (ref 20–75)
VITAMIN D2 SERPL-MCNC: <5 UG/L
VITAMIN D3 SERPL-MCNC: 74 UG/L

## 2024-02-24 NOTE — RESULT ENCOUNTER NOTE
Dear Marlee  Your vitamin D level was slightly elevated- back off on supplements just a bit.  Please call or MyChart my office with any questions or concerns.   Lyudmila Salas, PAC

## 2024-02-28 ENCOUNTER — OFFICE VISIT (OUTPATIENT)
Dept: PODIATRY | Facility: CLINIC | Age: 39
End: 2024-02-28
Attending: PHYSICIAN ASSISTANT
Payer: COMMERCIAL

## 2024-02-28 ENCOUNTER — ANCILLARY PROCEDURE (OUTPATIENT)
Dept: MAMMOGRAPHY | Facility: CLINIC | Age: 39
End: 2024-02-28
Attending: PHYSICIAN ASSISTANT
Payer: COMMERCIAL

## 2024-02-28 VITALS — HEART RATE: 65 BPM | DIASTOLIC BLOOD PRESSURE: 83 MMHG | SYSTOLIC BLOOD PRESSURE: 137 MMHG

## 2024-02-28 DIAGNOSIS — M21.6X1 PRONATION DEFORMITY OF BOTH FEET: Primary | ICD-10-CM

## 2024-02-28 DIAGNOSIS — M76.822 POSTERIOR TIBIAL TENDINITIS OF LEFT LOWER EXTREMITY: ICD-10-CM

## 2024-02-28 DIAGNOSIS — M21.6X2 PRONATION DEFORMITY OF BOTH FEET: Primary | ICD-10-CM

## 2024-02-28 DIAGNOSIS — Z12.31 VISIT FOR SCREENING MAMMOGRAM: ICD-10-CM

## 2024-02-28 DIAGNOSIS — M20.5X9 HALLUX LIMITUS, UNSPECIFIED LATERALITY: ICD-10-CM

## 2024-02-28 PROCEDURE — 99203 OFFICE O/P NEW LOW 30 MIN: CPT | Performed by: PODIATRIST

## 2024-02-28 PROCEDURE — 77067 SCR MAMMO BI INCL CAD: CPT | Mod: GC | Performed by: RADIOLOGY

## 2024-02-28 PROCEDURE — 77063 BREAST TOMOSYNTHESIS BI: CPT | Mod: GC | Performed by: RADIOLOGY

## 2024-02-28 NOTE — LETTER
2/28/2024         RE: Marlee Lloyd  [address]  [facility] [address]        Dear Colleague,    Thank you for referring your patient, Marlee Lloyd, to the [facility]. Please see a copy of my visit note below.    S: Patient seen today in consult from Lyudmila Salas and complains of left foot pain.  Points to posterior tibial tendon where is courses around the medial malleoli and especially at insertion.  Has had this for several months.  Pain aggravated by activity and relieved by rest.  She has swelling.  Denies ecchymosis.  Denies numbness weakness or increased deformity.  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  She has a history of Sjogren's.  Denies pain on the contralateral foot.    ROS: See above       Allergies   Allergen Reactions     Bactrim [Sulfamethoxazole-Trimethoprim] Nausea and Vomiting     Imitrex [Sumatriptan] Muscle Pain (Myalgia)     Oxiconazole Nausea and Vomiting     Nickel Swelling and Rash     Penicillins Rash       Current Outpatient Medications   Medication Sig Dispense Refill     levothyroxine (SYNTHROID/LEVOTHROID) 112 MCG tablet Take 1 tablet (112 mcg) by mouth daily 90 tablet 3     multivitamin w/minerals (MULTI-VITAMIN) tablet Take 1 tablet by mouth daily       Vitamin D, Cholecalciferol, 25 MCG (1000 UT) TABS 2 once a day         Patient Active Problem List   Diagnosis     Postsurgical hypothyroidism     Sjogren's syndrome (H24)     Systemic lupus erythematosus, unspecified SLE type, unspecified organ involvement status (H)       Past Medical History:   Diagnosis Date     Sjogren's syndrome (H24)        Past Surgical History:   Procedure Laterality Date     AS RAD RESEC TONSIL/PILLARS       TUBAL LIGATION  2015     wisdom teeth         Family History   Problem Relation Age of Onset     Hypothyroidism Mother      Hashimoto's thyroiditis Father      Multiple Sclerosis Brother      Cushing syndrome Brother      Lung Cancer  Brother      Thyroid Disease Brother      Thyroid Disease Brother      Raynaud syndrome Brother      Coronary Artery Disease Maternal Uncle         heart attacks in 60s     Celiac Disease Daughter      Colon Cancer No family hx of      Diabetes No family hx of      Breast Cancer No family hx of        Social History     Tobacco Use     Smoking status: Never     Smokeless tobacco: Never   Substance Use Topics     Alcohol use: Never         Exam:  Vitals: /83   Pulse 65   LMP 01/31/2024 (Exact Date)   BMI: There is no height or weight on file to calculate BMI.  Height: Data Unavailable    Constitutional/ general:  Pt is in no apparent distress, appears well-nourished.  Cooperative with history and physical exam.     Psych:  The patient answered questions appropriately.  Normal affect.  Seems to have reasonable expectations, in terms of treatment.     Lungs:  Non labored breathing, non labored speech. No cough.  No audible wheezing. Even, quiet breathing.       Vascular:  Pedal pulses are palpable bilaterally for both the DP and PT arteries.  CFT < 3 sec.  No edema.  No varicosities.  Pedal hair growth noted.     Neuro:  Alert and oriented x 3. Coordinated gait.  Light touch sensation is intact to the L4, L5, S1 distributions. No obvious deficits.  No evidence of neurological-based weakness, spasticity, or contracture in the lower extremities.    Derm: Normal texture and turgor.  No erythema, ecchymosis, or cyanosis.  No open lesions.     Musculoskeletal:    Lower extremity muscle strength is normal.  Patient is ambulatory without an assistive device or brace.  No gross deformities.  Normal ROM all fore foot and rearfoot joints except for both first MTPJ's.  The right has 75% normal dorsiflexion on the left has 50% normal dorsiflexion.  Bone prominence on the dorsal first metatarsal head..  No equinus.  With weightbearing patient has mild bilateral pronation with left being worse.  No pain with ROM.   Pain with  palpation posterior tibial tendon left side towards insertion medial navicular.  Medial navicular somewhat prominent.  Edema noted.  No erythema or ecchymosis.  Patient can go up on the ball of her foot quite easily with minimal pain.  No pain on the tibialis anterior tendon.    XR CALCANEUS 2 VIEWS LEFT  Order: [id number]  Narrative      Clinical History: Foreign Body In Heel, Left, Initial Encounter    Procedure: XR CALCANEUS 2 VIEWS LEFT    Comparison: None.    Findings: There is a 2 mm faint linear radiodensity in the superficial heel soft tissue, may represent a foreign body. No fracture is identified. The subtalar joints are unremarkable. No plantar or Achilles calcaneal spur.      A:    Bilateral pronation left greater than right  Left posterior tibial tendinitis  Bilateral hallux limitus left greater than right    P:  X-rays from past reviewed discussed the cause of this with the patient.  Discussed how left foot slightly more pronated which causes more stress on posterior tibial tendon.  Discussed patient being barefoot all day aggravates this.  Dispensed ankle brace to be worn with good shoes at all times.  Ice bid.  Can use Voltaren gel on here.  Minimize activities until healed.  Explained must have good support for feet at all times or could develop tear in tendon and may need surgery.    Discussed importance of wearing these in a good shoe at all times to prevent future problems.   Made suggestions on good house shoes.  Also make sure her outside shoes are of good quality.  She can also try over-the-counter arch supports.  Briefly discussed orthotics.  Patient's connective tissue disorder may also make this slower to heal.  Also discussed she has bilateral hallux limitus with the left being greater than right.  Discussed mechanics causing this.  Discussed good how shoes will also help forefoot function more efficiently to prevent forefoot arthritis in the future.  RETURN TO CLINIC PRN.  Thank you for  allowing me participate in the care of this patient.        Cesar Medellin DPM, FACFAS             Again, thank you for allowing me to participate in the care of your patient.        Sincerely,        Cesar Medellin DPM

## 2024-02-28 NOTE — PATIENT INSTRUCTIONS
We wish you continued good healing. If you have any questions or concerns, please do not hesitate to contact us at  [phone]    [facility] (secure e-mail communication and access to your chart) to send a message or to make an appointment.    Please remember to call and schedule a follow up appointment if one was recommended at your earliest convenience.     PODIATRY CLINIC HOURS  TELEPHONE NUMBER    Dr. Cesar MARTINPJENNIFER FACFAS        Clinics:  [facility]   [place]  Tuesday 1PM-6PM  [place]  Wednesday 745AM-330PM  [place]  Monday 2nd,4th  830AM-4PM  Thursday/Friday 745AM-230PM     [place] APPOINTMENTS  [phone]    [place] APPOINTMENTS  [phone]          If you need a medication refill, please contact us you may need lab work and/or a follow up visit prior to your refill (i.e. Antifungal medications).  If MRI needed please call Imaging at [phone]   HOW DO I GET MY KNEE SCOOTER? Knee scooters can be picked up at ANY Medical Supply stores with your knee scooter Prescription.  OR  Bring your signed prescription to an [facility] Medical Equipment showroom.   Set up an appointment for your custom Orthotics. Call any Orthotics locations call [phone]

## 2024-02-28 NOTE — PROGRESS NOTES
S: Patient seen today in consult from Lyudmila Salas and complains of left foot pain.  Points to posterior tibial tendon where is courses around the medial malleoli and especially at insertion.  Has had this for several months.  Pain aggravated by activity and relieved by rest.  She has swelling.  Denies ecchymosis.  Denies numbness weakness or increased deformity.  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  She has a history of Sjogren's.  Denies pain on the contralateral foot.    ROS: See above       Allergies   Allergen Reactions    Bactrim [Sulfamethoxazole-Trimethoprim] Nausea and Vomiting    Imitrex [Sumatriptan] Muscle Pain (Myalgia)    Oxiconazole Nausea and Vomiting    Nickel Swelling and Rash    Penicillins Rash       Current Outpatient Medications   Medication Sig Dispense Refill    levothyroxine (SYNTHROID/LEVOTHROID) 112 MCG tablet Take 1 tablet (112 mcg) by mouth daily 90 tablet 3    multivitamin w/minerals (MULTI-VITAMIN) tablet Take 1 tablet by mouth daily      Vitamin D, Cholecalciferol, 25 MCG (1000 UT) TABS 2 once a day         Patient Active Problem List   Diagnosis    Postsurgical hypothyroidism    Sjogren's syndrome (H24)    Systemic lupus erythematosus, unspecified SLE type, unspecified organ involvement status (H)       Past Medical History:   Diagnosis Date    Sjogren's syndrome (H24)        Past Surgical History:   Procedure Laterality Date    AS RAD RESEC TONSIL/PILLARS      TUBAL LIGATION  2015    wisdom teeth         Family History   Problem Relation Age of Onset    Hypothyroidism Mother     Hashimoto's thyroiditis Father     Multiple Sclerosis Brother     Cushing syndrome Brother     Lung Cancer Brother     Thyroid Disease Brother     Thyroid Disease Brother     Raynaud syndrome Brother     Coronary Artery Disease Maternal Uncle         heart attacks in 60s    Celiac Disease Daughter     Colon Cancer No family hx of     Diabetes No family hx of     Breast Cancer No  family hx of        Social History     Tobacco Use    Smoking status: Never    Smokeless tobacco: Never   Substance Use Topics    Alcohol use: Never         Exam:  Vitals: /83   Pulse 65   LMP 01/31/2024 (Exact Date)   BMI: There is no height or weight on file to calculate BMI.  Height: Data Unavailable    Constitutional/ general:  Pt is in no apparent distress, appears well-nourished.  Cooperative with history and physical exam.     Psych:  The patient answered questions appropriately.  Normal affect.  Seems to have reasonable expectations, in terms of treatment.     Lungs:  Non labored breathing, non labored speech. No cough.  No audible wheezing. Even, quiet breathing.       Vascular:  Pedal pulses are palpable bilaterally for both the DP and PT arteries.  CFT < 3 sec.  No edema.  No varicosities.  Pedal hair growth noted.     Neuro:  Alert and oriented x 3. Coordinated gait.  Light touch sensation is intact to the L4, L5, S1 distributions. No obvious deficits.  No evidence of neurological-based weakness, spasticity, or contracture in the lower extremities.    Derm: Normal texture and turgor.  No erythema, ecchymosis, or cyanosis.  No open lesions.     Musculoskeletal:    Lower extremity muscle strength is normal.  Patient is ambulatory without an assistive device or brace.  No gross deformities.  Normal ROM all fore foot and rearfoot joints except for both first MTPJ's.  The right has 75% normal dorsiflexion on the left has 50% normal dorsiflexion.  Bone prominence on the dorsal first metatarsal head..  No equinus.  With weightbearing patient has mild bilateral pronation with left being worse.  No pain with ROM.   Pain with palpation posterior tibial tendon left side towards insertion medial navicular.  Medial navicular somewhat prominent.  Edema noted.  No erythema or ecchymosis.  Patient can go up on the ball of her foot quite easily with minimal pain.  No pain on the tibialis anterior tendon.    XR  CALCANEUS 2 VIEWS LEFT  Order: [id number]  Narrative      Clinical History: Foreign Body In Heel, Left, Initial Encounter    Procedure: XR CALCANEUS 2 VIEWS LEFT    Comparison: None.    Findings: There is a 2 mm faint linear radiodensity in the superficial heel soft tissue, may represent a foreign body. No fracture is identified. The subtalar joints are unremarkable. No plantar or Achilles calcaneal spur.      A:    Bilateral pronation left greater than right  Left posterior tibial tendinitis  Bilateral hallux limitus left greater than right    P:  X-rays from past reviewed discussed the cause of this with the patient.  Discussed how left foot slightly more pronated which causes more stress on posterior tibial tendon.  Discussed patient being barefoot all day aggravates this.  Dispensed ankle brace to be worn with good shoes at all times.  Ice bid.  Can use Voltaren gel on here.  Minimize activities until healed.  Explained must have good support for feet at all times or could develop tear in tendon and may need surgery.    Discussed importance of wearing these in a good shoe at all times to prevent future problems.   Made suggestions on good house shoes.  Also make sure her outside shoes are of good quality.  She can also try over-the-counter arch supports.  Briefly discussed orthotics.  Patient's connective tissue disorder may also make this slower to heal.  Also discussed she has bilateral hallux limitus with the left being greater than right.  Discussed mechanics causing this.  Discussed good how shoes will also help forefoot function more efficiently to prevent forefoot arthritis in the future.  RETURN TO CLINIC PRN.  Thank you for allowing me participate in the care of this patient.        Cesar Medellin DPM, FACFAS

## 2024-02-29 ENCOUNTER — TELEPHONE (OUTPATIENT)
Dept: FAMILY MEDICINE | Facility: CLINIC | Age: 39
End: 2024-02-29

## 2024-02-29 NOTE — RESULT ENCOUNTER NOTE
Dear Marlee  Your mammogram was normal  I do recommend annual mammograms.   Please call or MyChart my office with any questions or concerns.   Lyudmila Salas, PAC

## 2024-02-29 NOTE — LETTER
February 29, 2024      Marlee Lloyd  [address]  [facility] [address]        Dear Ms.Son Marie,    We are writing to inform you of your test results.    Your vitamin D level was slightly elevated- back off on supplements just a bit.                    Your cholesterol looks good.  All of your allergy tests were negative.  Your thyroid function was normal.  Your electrolytes, blood sugar, kidney function and liver function were normal.    Your vitamin D level is still in process.  Please call or MyChart my office with any questions or concerns.  JOSE E Henderson        The 10-year ASCVD risk score (Mino SUMMERS, et al., 2019) is: 0.5%    Values used to calculate the score:      Age: 38 years      Sex: Female      Is Non- : No      Diabetic: No      Tobacco smoker: No      Systolic Blood Pressure: 124 mmHg      Is BP treated: No      HDL Cholesterol: 57 mg/dL      Total Cholesterol: 194 mg/dL           If you have any questions or concerns, please call the clinic at the number listed above.       Sincerely, Maritza Coreas

## 2024-02-29 NOTE — TELEPHONE ENCOUNTER
Patient has not read [employer] results     Please send letter with lab results     Dear Marlee  Your vitamin D level was slightly elevated- back off on supplements just a bit.               Your cholesterol looks good.  All of your allergy tests were negative.  Your thyroid function was normal.  Your electrolytes, blood sugar, kidney function and liver function were normal.    Your vitamin D level is still in process.  Please call or [facility] my office with any questions or concerns.  JOSE E Henderson        The 10-year ASCVD risk score (Mino SUMMERS, et al., 2019) is: 0.5%    Values used to calculate the score:      Age: 38 years      Sex: Female      Is Non- : No      Diabetic: No      Tobacco smoker: No      Systolic Blood Pressure: 124 mmHg      Is BP treated: No      HDL Cholesterol: 57 mg/dL      Total Cholesterol: 194 mg/dL

## 2024-03-05 ENCOUNTER — OFFICE VISIT (OUTPATIENT)
Dept: URGENT CARE | Facility: URGENT CARE | Age: 39
End: 2024-03-05
Payer: COMMERCIAL

## 2024-03-05 VITALS
SYSTOLIC BLOOD PRESSURE: 127 MMHG | DIASTOLIC BLOOD PRESSURE: 76 MMHG | HEART RATE: 64 BPM | WEIGHT: 151 LBS | RESPIRATION RATE: 16 BRPM | TEMPERATURE: 97.5 F | BODY MASS INDEX: 23.3 KG/M2 | OXYGEN SATURATION: 99 %

## 2024-03-05 DIAGNOSIS — B02.9 HERPES ZOSTER WITHOUT COMPLICATION: Primary | ICD-10-CM

## 2024-03-05 PROCEDURE — 99213 OFFICE O/P EST LOW 20 MIN: CPT

## 2024-03-05 RX ORDER — VALACYCLOVIR HYDROCHLORIDE 1 G/1
1000 TABLET, FILM COATED ORAL 3 TIMES DAILY
Qty: 21 TABLET | Refills: 0 | Status: SHIPPED | OUTPATIENT
Start: 2024-03-05 | End: 2024-05-20

## 2024-03-05 NOTE — PROGRESS NOTES
ASSESSMENT:  (B02.9) Herpes zoster without complication  (primary encounter diagnosis)  Plan: valACYclovir (VALTREX) 1000 mg tablet    PLAN:  Given the abrupt onset of the rash, symptoms of the rash, the patient's immunocompromised status and vesicular clinical appearance; will treat with valacyclovir for shingles.  Shingles patient instructions discussed and provided.  Informed the patient to take the medication as prescribed and finish the full course even if symptoms improve.  We discussed using cool compresses to the area for symptoms.  We also discussed using calamine lotion for the itching.  Informed the patient to return to clinic with any new or worsening symptoms.  Patient acknowledged her understanding of the above plan.    The use of Dragon/PowerMic dictation services may have been used to construct the content in this note; any grammatical or spelling errors are non-intentional. Please contact the author of this note directly if you are in need of any clarification.      Adolfo Kaur, CIRO CNP      SUBJECTIVE:  Marlee Lloyd is a 38 year old female who presents to the clinic today for a rash.  Onset of rash was 1 day ago.  Rash is still present, and worsening.  Location of the rash: right inner portion of the foot with reported burning sensation up the ankle into the lower leg  Symptoms are moderate and rash seems to be worsening.  Associated symptoms include: burning and itching.  Therapies tried to improve the rash: topical antifungals.  Recent new medication? No  Previous history of a similar rash? No  Recent exposure history: none known    ROS:  Negative except noted above.    OBJECTIVE:  EXAM:  VITALS: /76   Pulse 64   Temp 97.5  F (36.4  C) (Tympanic)   Resp 16   Wt 68.5 kg (151 lb)   LMP 01/31/2024 (Exact Date)   SpO2 99%   BMI 23.30 kg/m    GENERAL APPEARANCE: healthy, alert and no distress  SKIN: Rash description:    Location: medial aspect of right foot following L4  dermatome    Distribution: localized  Lesion grouping: dermatomal following L4 dermatome  Lesion type: vesicular  Color: red  Nail exam: normal- no clubbing or nail changes  Hair exam: normal

## 2024-03-05 NOTE — PATIENT INSTRUCTIONS
Take the medication as prescribed and finish the full course even if symptoms improve.  You can use cool compresses to the area for symptoms.  You can also Calamine lotion for the itching.

## 2024-03-27 ENCOUNTER — OFFICE VISIT (OUTPATIENT)
Dept: PODIATRY | Facility: CLINIC | Age: 39
End: 2024-03-27
Payer: COMMERCIAL

## 2024-03-27 ENCOUNTER — ANCILLARY PROCEDURE (OUTPATIENT)
Dept: GENERAL RADIOLOGY | Facility: CLINIC | Age: 39
End: 2024-03-27
Attending: PODIATRIST
Payer: COMMERCIAL

## 2024-03-27 DIAGNOSIS — M76.822 POSTERIOR TIBIAL TENDINITIS OF LEFT LOWER EXTREMITY: ICD-10-CM

## 2024-03-27 DIAGNOSIS — M21.6X2 PRONATION DEFORMITY OF BOTH FEET: Primary | ICD-10-CM

## 2024-03-27 DIAGNOSIS — M21.6X1 PRONATION DEFORMITY OF BOTH FEET: Primary | ICD-10-CM

## 2024-03-27 DIAGNOSIS — M21.6X2 PRONATION DEFORMITY OF BOTH FEET: ICD-10-CM

## 2024-03-27 DIAGNOSIS — M21.6X1 PRONATION DEFORMITY OF BOTH FEET: ICD-10-CM

## 2024-03-27 PROBLEM — Z86.16 HISTORY OF COVID-19: Status: ACTIVE | Noted: 2022-01-26

## 2024-03-27 PROBLEM — R87.619 ABNORMAL PAP SMEAR OF CERVIX: Status: ACTIVE | Noted: 2024-03-27

## 2024-03-27 PROCEDURE — 73630 X-RAY EXAM OF FOOT: CPT | Mod: LT | Performed by: RADIOLOGY

## 2024-03-27 PROCEDURE — 99213 OFFICE O/P EST LOW 20 MIN: CPT | Performed by: PODIATRIST

## 2024-03-27 NOTE — PROGRESS NOTES
S:     2/28/24   Patient seen today in consult from Lyudmila Salas and complains of left foot pain.  Points to posterior tibial tendon where is courses around the medial malleoli and especially at insertion.  Has had this for several months.  Pain aggravated by activity and relieved by rest.  She has swelling.  Denies ecchymosis.  Denies numbness weakness or increased deformity.  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  She has a history of Sjogren's.  Denies pain on the contralateral foot.    3/27/24   patient returns for left foot pain.  She has gotten good shoes inside and outside since last visit.  It has not gotten better.  It is gotten worse.  She points to area of the navicular and posterior tibial tendon insertion as to where most the pain is.  Denies any new bruising.    ROS: See above       Allergies   Allergen Reactions    Bactrim [Sulfamethoxazole-Trimethoprim] Nausea and Vomiting    Imitrex [Sumatriptan] Muscle Pain (Myalgia)    Oxiconazole Nausea and Vomiting    Nickel Swelling and Rash    Penicillins Rash       Current Outpatient Medications   Medication Sig Dispense Refill    levothyroxine (SYNTHROID/LEVOTHROID) 112 MCG tablet Take 1 tablet (112 mcg) by mouth daily 90 tablet 3    multivitamin w/minerals (MULTI-VITAMIN) tablet Take 1 tablet by mouth daily      valACYclovir (VALTREX) 1000 mg tablet Take 1 tablet (1,000 mg) by mouth 3 times daily for 7 days 21 tablet 0    Vitamin D, Cholecalciferol, 25 MCG (1000 UT) TABS 2 once a day         Patient Active Problem List   Diagnosis    Postsurgical hypothyroidism    Sjogren's syndrome (H24)    Systemic lupus erythematosus, unspecified SLE type, unspecified organ involvement status (H)       Past Medical History:   Diagnosis Date    Sjogren's syndrome (H24)        Past Surgical History:   Procedure Laterality Date    AS RAD RESEC TONSIL/PILLARS      TUBAL LIGATION  2015    wisdom teeth         Family History   Problem Relation Age of  Onset    Hypothyroidism Mother     Hashimoto's thyroiditis Father     Multiple Sclerosis Brother     Cushing syndrome Brother     Lung Cancer Brother     Thyroid Disease Brother     Thyroid Disease Brother     Raynaud syndrome Brother     Coronary Artery Disease Maternal Uncle         heart attacks in 60s    Celiac Disease Daughter     Colon Cancer No family hx of     Diabetes No family hx of     Breast Cancer No family hx of        Social History     Tobacco Use    Smoking status: Never    Smokeless tobacco: Never   Substance Use Topics    Alcohol use: Never         Exam:  Vitals: LMP 01/31/2024 (Exact Date)   BMI: There is no height or weight on file to calculate BMI.  Height: Data Unavailable    Constitutional/ general:  Pt is in no apparent distress, appears well-nourished.  Cooperative with history and physical exam.     Psych:  The patient answered questions appropriately.  Normal affect.  Seems to have reasonable expectations, in terms of treatment.     Lungs:  Non labored breathing, non labored speech. No cough.  No audible wheezing. Even, quiet breathing.       Vascular:  Pedal pulses are palpable bilaterally for both the DP and PT arteries.  CFT < 3 sec.  No edema.  No varicosities.  Pedal hair growth noted.     Neuro:  Alert and oriented x 3. Coordinated gait.  Light touch sensation is intact    Derm: Normal texture and turgor.  No erythema, ecchymosis, or cyanosis.  No open lesions.     Musculoskeletal:    Lower extremity muscle strength is normal.  Patient is ambulatory without an assistive device or brace.  No gross deformities.  Normal ROM all fore foot and rearfoot joints except for both first MTPJ's.  With weightbearing patient has mild bilateral pronation with left being worse.  No pain with ROM.   Pain with palpation posterior tibial tendon left side towards insertion medial navicular.  Also pain plantar to medial navicular.  Medial navicular somewhat prominent.  Edema noted.  No erythema or  ecchymosis.  Patient can go up on the ball of her foot quite easily with minimal pain.  No pain on the tibialis anterior tendon.    X-rays 4 views taken of the left foot today which shows os tibialis externum      A:    Bilateral pronation left greater than right  Left posterior tibial tendinitis  Left os tibialis externum taken today of left foot.    P:  X-rays pointed out this prominent bone.  Discussed this is assessed 3 bone with fibrotic connections.  Discussed spring ligament could be irritated as well.  Patient admits that she is worn a poor shoes most of her life until this started becoming painful.  It is quite painful now.  Discussed offloading strategies.  She has tried an ankle brace and did not like this.  Discussed CAM Walker.  Will dispense cam walker today.  Patient to wear this at all times while walking.  When not walking patient will take this off and do ROM to prevent blood clot and joint stiffness.  Patient will not sleep with this on.  She has good shoes.  Discussed over-the-counter arch supports.  She will get some.  Also discussed orthotics.  She will call if she would like a pair.  Will return to 2 weeks to ensure she is getting better.  If not improving may consider plantar flexing cam walker, nonweightbearing, or MRI for further evaluation.    Cesar Medellin DPM, FACFAS

## 2024-03-27 NOTE — LETTER
3/27/2024         RE: Marlee Lloyd  [address]  [facility] [address]        Dear Colleague,    Thank you for referring your patient, Marlee Lloyd, to the [facility]. Please see a copy of my visit note below.    S:     2/28/24   Patient seen today in consult from Lyudmila Salas and complains of left foot pain.  Points to posterior tibial tendon where is courses around the medial malleoli and especially at insertion.  Has had this for several months.  Pain aggravated by activity and relieved by rest.  She has swelling.  Denies ecchymosis.  Denies numbness weakness or increased deformity.  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  She has a history of Sjogren's.  Denies pain on the contralateral foot.    3/27/24   patient returns for left foot pain.  She has gotten good shoes inside and outside since last visit.  It has not gotten better.  It is gotten worse.  She points to area of the navicular and posterior tibial tendon insertion as to where most the pain is.  Denies any new bruising.    ROS: See above       Allergies   Allergen Reactions     Bactrim [Sulfamethoxazole-Trimethoprim] Nausea and Vomiting     Imitrex [Sumatriptan] Muscle Pain (Myalgia)     Oxiconazole Nausea and Vomiting     Nickel Swelling and Rash     Penicillins Rash       Current Outpatient Medications   Medication Sig Dispense Refill     levothyroxine (SYNTHROID/LEVOTHROID) 112 MCG tablet Take 1 tablet (112 mcg) by mouth daily 90 tablet 3     multivitamin w/minerals (MULTI-VITAMIN) tablet Take 1 tablet by mouth daily       valACYclovir (VALTREX) 1000 mg tablet Take 1 tablet (1,000 mg) by mouth 3 times daily for 7 days 21 tablet 0     Vitamin D, Cholecalciferol, 25 MCG (1000 UT) TABS 2 once a day         Patient Active Problem List   Diagnosis     Postsurgical hypothyroidism     Sjogren's syndrome (H24)     Systemic lupus erythematosus, unspecified SLE type, unspecified organ  involvement status (H)       Past Medical History:   Diagnosis Date     Sjogren's syndrome (H24)        Past Surgical History:   Procedure Laterality Date     AS RAD RESEC TONSIL/PILLARS       TUBAL LIGATION  2015     wisdom teeth         Family History   Problem Relation Age of Onset     Hypothyroidism Mother      Hashimoto's thyroiditis Father      Multiple Sclerosis Brother      Cushing syndrome Brother      Lung Cancer Brother      Thyroid Disease Brother      Thyroid Disease Brother      Raynaud syndrome Brother      Coronary Artery Disease Maternal Uncle         heart attacks in 60s     Celiac Disease Daughter      Colon Cancer No family hx of      Diabetes No family hx of      Breast Cancer No family hx of        Social History     Tobacco Use     Smoking status: Never     Smokeless tobacco: Never   Substance Use Topics     Alcohol use: Never         Exam:  Vitals: LMP 01/31/2024 (Exact Date)   BMI: There is no height or weight on file to calculate BMI.  Height: Data Unavailable    Constitutional/ general:  Pt is in no apparent distress, appears well-nourished.  Cooperative with history and physical exam.     Psych:  The patient answered questions appropriately.  Normal affect.  Seems to have reasonable expectations, in terms of treatment.     Lungs:  Non labored breathing, non labored speech. No cough.  No audible wheezing. Even, quiet breathing.       Vascular:  Pedal pulses are palpable bilaterally for both the DP and PT arteries.  CFT < 3 sec.  No edema.  No varicosities.  Pedal hair growth noted.     Neuro:  Alert and oriented x 3. Coordinated gait.  Light touch sensation is intact    Derm: Normal texture and turgor.  No erythema, ecchymosis, or cyanosis.  No open lesions.     Musculoskeletal:    Lower extremity muscle strength is normal.  Patient is ambulatory without an assistive device or brace.  No gross deformities.  Normal ROM all fore foot and rearfoot joints except for both first MTPJ's.  With  weightbearing patient has mild bilateral pronation with left being worse.  No pain with ROM.   Pain with palpation posterior tibial tendon left side towards insertion medial navicular.  Also pain plantar to medial navicular.  Medial navicular somewhat prominent.  Edema noted.  No erythema or ecchymosis.  Patient can go up on the ball of her foot quite easily with minimal pain.  No pain on the tibialis anterior tendon.    X-rays 4 views taken of the left foot today which shows os tibialis externum      A:    Bilateral pronation left greater than right  Left posterior tibial tendinitis  Left os tibialis externum taken today of left foot.    P:  X-rays pointed out this prominent bone.  Discussed this is assessed 3 bone with fibrotic connections.  Discussed spring ligament could be irritated as well.  Patient admits that she is worn a poor shoes most of her life until this started becoming painful.  It is quite painful now.  Discussed offloading strategies.  She has tried an ankle brace and did not like this.  Discussed CAM Walker.  Will dispense cam walker today.  Patient to wear this at all times while walking.  When not walking patient will take this off and do ROM to prevent blood clot and joint stiffness.  Patient will not sleep with this on.  She has good shoes.  Discussed over-the-counter arch supports.  She will get some.  Also discussed orthotics.  She will call if she would like a pair.  Will return to 2 weeks to ensure she is getting better.  If not improving may consider plantar flexing cam walker, nonweightbearing, or MRI for further evaluation.    Cesar Medellin DPM, FACFAS             Again, thank you for allowing me to participate in the care of your patient.        Sincerely,        Cesar Medellin DPM

## 2024-03-27 NOTE — PATIENT INSTRUCTIONS
"[employer] / CAM WALKING BOOT INSTRUCTIONS  - Do NOT drive with CAM walker on. This is due to safety and legal issues.   - Do NOT wear the CAM walker on long car/train rides or on an airplane.  - Remove the CAM walker several times a day and do ankle range of motion (ROM) exercises/wiggle toes.  - It is recommended that a thick-soled shoe be worn on the other foot to offset any created leg length issue.    - You can purchase an \"even up\" on [employer] to place under the other shoe to help too.  - The boot does not have to be worn at night.   - There is an increased risk of developing a blood clot with lower extremity immobilization. ROM exercises and knee-high compression (tenso /ACE wrap) is recommended to lower that risk.   - You should seek medical attention if you experience calf swelling and/or pain, chest pain, or shortness of breath.   If you need to return or exchange the boot, please contact [facility] @ [phone] We wish you continued good healing. If you have any questions or concerns, please do not hesitate to contact us at  [phone]    1calendar (secure e-mail communication and access to your chart) to send a message or to make an appointment.    Please remember to call and schedule a follow up appointment if one was recommended at your earliest convenience.     PODIATRY CLINIC HOURS  TELEPHONE NUMBER    Dr. Cesar MARTINP.DIANA FACFAS        Clinics:  [facility]  Tuesday 1PM-6PM  [place]  Wednesday 745AM-330PM  [place]  Monday 2nd,4th  830AM-4PM  Thursday/Friday 745AM-230PM     [facility] APPOINTMENTS  [phone]    [place] APPOINTMENTS  [phone]          If you need a medication refill, please contact us you may need lab work and/or a follow up visit prior to your refill (i.e. Antifungal medications).  If MRI needed please call Imaging at [phone]   HOW DO I GET MY KNEE SCOOTER? Knee scooters can be picked " up at ANY Medical Supply stores with your knee scooter Prescription.  OR  Bring your signed prescription to an [facility] Medical Equipment showroom.   Set up an appointment for your custom Orthotics. Call any Orthotics locations call [phone]         .alisa

## 2024-04-10 ENCOUNTER — OFFICE VISIT (OUTPATIENT)
Dept: PODIATRY | Facility: CLINIC | Age: 39
End: 2024-04-10
Payer: COMMERCIAL

## 2024-04-10 DIAGNOSIS — M21.6X1 PRONATION DEFORMITY OF BOTH FEET: Primary | ICD-10-CM

## 2024-04-10 DIAGNOSIS — M76.822 POSTERIOR TIBIAL TENDINITIS OF LEFT LOWER EXTREMITY: ICD-10-CM

## 2024-04-10 DIAGNOSIS — M21.6X2 PRONATION DEFORMITY OF BOTH FEET: Primary | ICD-10-CM

## 2024-04-10 PROCEDURE — 99213 OFFICE O/P EST LOW 20 MIN: CPT | Performed by: PODIATRIST

## 2024-04-10 NOTE — PATIENT INSTRUCTIONS
We wish you continued good healing. If you have any questions or concerns, please do not hesitate to contact us at  [phone]    [facility] (secure e-mail communication and access to your chart) to send a message or to make an appointment.    Please remember to call and schedule a follow up appointment if one was recommended at your earliest convenience.     PODIATRY CLINIC HOURS  TELEPHONE NUMBER    Dr. Cesar Medellin D.P.M [facility]        Clinics:  [facility]  Tuesday 1PM-6PM  [place]  Wednesday 745AM-330PM  [place]  Monday 2nd,4th  830AM-4PM  Thursday/Friday 745AM-230PM     [facility] APPOINTMENTS  [phone]    [place] APPOINTMENTS  [phone]          If you need a medication refill, please contact us you may need lab work and/or a follow up visit prior to your refill (i.e. Antifungal medications).  If MRI needed please call Imaging at [phone]   HOW DO I GET MY KNEE SCOOTER? Knee scooters can be picked up at ANY Medical Supply stores with your knee scooter Prescription.  OR  Bring your signed prescription to an [facility] Medical Equipment showroom.   Set up an appointment for your custom Orthotics. Call any Orthotics locations call [phone]        MEDICAL SUPPLY STORES  [facility] LOCATIONS    [facility] [place]  Phone [phone]  Fax [phone]    [place]  [phone]    [facility])  [phone]    [place]  [phone]    [place]  [phone]    [place]  [phone]    [place]  [phone]    [facility] [place]  [address] [address]  Phone: [phone]  Hours: Monday - Friday: 8:30-5  [place], [place]  [address] [place] [address]  Phone: [phone]  Fax: [phone]  Hours: Monday - Friday: 8-5:30, Saturday: 9-2    [employer]  [address]  [place] [address]   [facility]   ([facility])  [phone]  [address]  [address]  [place], [address]  Phone: [phone]  Fax: [phone]  Hours: Monday-Friday 9 a.m. - 5 p.m.

## 2024-04-10 NOTE — PROGRESS NOTES
S:     2/28/24   Patient seen today in consult from Lyudmila Salas and complains of left foot pain.  Points to posterior tibial tendon where is courses around the medial malleoli and especially at insertion.  Has had this for several months.  Pain aggravated by activity and relieved by rest.  She has swelling.  Denies ecchymosis.  Denies numbness weakness or increased deformity.  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  She has a history of Sjogren's.  Denies pain on the contralateral foot.    3/27/24   patient returns for left foot pain.  She has gotten good shoes inside and outside since last visit.  It has not gotten better.  It is gotten worse.  She points to area of the navicular and posterior tibial tendon insertion as to where most the pain is.  Denies any new bruising.    4/10/24 patient returns for left posterior tibial tendinitis.  Last visit we dispensed a cam walker.  Has not really helped.  She is wearing this when walking.  She has not been icing this.  Denies any new edema or ecchymosis.    ROS: See above       Allergies   Allergen Reactions    Bactrim [Sulfamethoxazole-Trimethoprim] Nausea and Vomiting    Imitrex [Sumatriptan] Muscle Pain (Myalgia)    Oxiconazole Nausea and Vomiting    Nickel Swelling and Rash    Penicillins Rash       Current Outpatient Medications   Medication Sig Dispense Refill    levothyroxine (SYNTHROID/LEVOTHROID) 112 MCG tablet Take 1 tablet (112 mcg) by mouth daily 90 tablet 3    multivitamin w/minerals (MULTI-VITAMIN) tablet Take 1 tablet by mouth daily      valACYclovir (VALTREX) 1000 mg tablet Take 1 tablet (1,000 mg) by mouth 3 times daily for 7 days 21 tablet 0    Vitamin D, Cholecalciferol, 25 MCG (1000 UT) TABS 2 once a day         Patient Active Problem List   Diagnosis    Postsurgical hypothyroidism    Sjogren's syndrome (H24)    Systemic lupus erythematosus, unspecified SLE type, unspecified organ involvement status (H)    History of COVID-19     Abnormal Pap smear of cervix       Past Medical History:   Diagnosis Date    Sjogren's syndrome (H24)        Past Surgical History:   Procedure Laterality Date    AS RAD RESEC TONSIL/PILLARS      TUBAL LIGATION  2015    wisdom teeth         Family History   Problem Relation Age of Onset    Hypothyroidism Mother     Hashimoto's thyroiditis Father     Multiple Sclerosis Brother     Cushing syndrome Brother     Lung Cancer Brother     Thyroid Disease Brother     Thyroid Disease Brother     Raynaud syndrome Brother     Coronary Artery Disease Maternal Uncle         heart attacks in 60s    Celiac Disease Daughter     Colon Cancer No family hx of     Diabetes No family hx of     Breast Cancer No family hx of        Social History     Tobacco Use    Smoking status: Never    Smokeless tobacco: Never   Substance Use Topics    Alcohol use: Never         Exam:  Vitals: LMP 01/31/2024 (Exact Date)   BMI: There is no height or weight on file to calculate BMI.  Height: Data Unavailable    Constitutional/ general:  Pt is in no apparent distress, appears well-nourished.  Cooperative with history and physical exam.     Psych:  The patient answered questions appropriately.  Normal affect.  Seems to have reasonable expectations, in terms of treatment.     Lungs:  Non labored breathing, non labored speech. No cough.  No audible wheezing. Even, quiet breathing.       Vascular:  Pedal pulses are palpable bilaterally for both the DP and PT arteries.  CFT < 3 sec.  No edema.  No varicosities.  Pedal hair growth noted.     Neuro:  Alert and oriented x 3. Coordinated gait.  Light touch sensation is intact    Derm: Normal texture and turgor.  No erythema, ecchymosis, or cyanosis.  No open lesions.     Musculoskeletal:    Lower extremity muscle strength is normal.  Patient is ambulatory without an assistive device or brace.  No gross deformities.  Normal ROM all fore foot and rearfoot joints except for both first MTPJ's.  With weightbearing  patient has mild bilateral pronation with left being worse.  No pain with ROM.   Pain with palpation posterior tibial tendon left side towards insertion medial navicular.  Also pain plantar to medial navicular.  Medial navicular somewhat prominent.  Minimal edema noted.  No erythema or ecchymosis.  Patient can go up on the ball of her foot quite easily with minimal pain.  No pain on the tibialis anterior tendon.    X-rays 4 views taken of the left foot today which shows os tibialis externum      A:    Bilateral pronation left greater than right  Left posterior tibial tendinitis  Left os tibialis externum taken today of left foot.    P: Discussed additional offloading with plantar flexing CAM Walker.  Discussed more arch support with ankle brace inside of cam walker.  For further offloading we gave her prescription for knee walker.  She has not been icing this.  Will ice 2-3 times a day for 20 minutes.  She cannot take NSAIDs.  Discussed topical Voltaren gel may help this.  May do gentle range of motion when not walking.  Discussed MRI for further evaluation to make sure no underlying occult pathology.  She is in agreement.  We placed an order.  Will call her with results.  Discussed if just tendinitis ultrasound-guided injection may be helpful.  Discussed once better good support will be helpful for her.  We wrote a prescription for orthotics.  She called her insurance company and they will pay 80%.    Cesar Medellin DPM, FACFAS

## 2024-04-10 NOTE — LETTER
4/10/2024         RE: Marlee Lloyd  [address]  [facility] [address]        Dear Colleague,    Thank you for referring your patient, Marlee Lloyd, to the [facility]. Please see a copy of my visit note below.    S:     2/28/24   Patient seen today in consult from Lyudmila Salas and complains of left foot pain.  Points to posterior tibial tendon where is courses around the medial malleoli and especially at insertion.  Has had this for several months.  Pain aggravated by activity and relieved by rest.  She has swelling.  Denies ecchymosis.  Denies numbness weakness or increased deformity.  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  She has a history of Sjogren's.  Denies pain on the contralateral foot.    3/27/24   patient returns for left foot pain.  She has gotten good shoes inside and outside since last visit.  It has not gotten better.  It is gotten worse.  She points to area of the navicular and posterior tibial tendon insertion as to where most the pain is.  Denies any new bruising.    4/10/24 patient returns for left posterior tibial tendinitis.  Last visit we dispensed a cam walker.  Has not really helped.  She is wearing this when walking.  She has not been icing this.  Denies any new edema or ecchymosis.    ROS: See above       Allergies   Allergen Reactions     Bactrim [Sulfamethoxazole-Trimethoprim] Nausea and Vomiting     Imitrex [Sumatriptan] Muscle Pain (Myalgia)     Oxiconazole Nausea and Vomiting     Nickel Swelling and Rash     Penicillins Rash       Current Outpatient Medications   Medication Sig Dispense Refill     levothyroxine (SYNTHROID/LEVOTHROID) 112 MCG tablet Take 1 tablet (112 mcg) by mouth daily 90 tablet 3     multivitamin w/minerals (MULTI-VITAMIN) tablet Take 1 tablet by mouth daily       valACYclovir (VALTREX) 1000 mg tablet Take 1 tablet (1,000 mg) by mouth 3 times daily for 7 days 21 tablet 0     Vitamin D,  Cholecalciferol, 25 MCG (1000 UT) TABS 2 once a day         Patient Active Problem List   Diagnosis     Postsurgical hypothyroidism     Sjogren's syndrome (H24)     Systemic lupus erythematosus, unspecified SLE type, unspecified organ involvement status (H)     History of COVID-19     Abnormal Pap smear of cervix       Past Medical History:   Diagnosis Date     Sjogren's syndrome (H24)        Past Surgical History:   Procedure Laterality Date     AS RAD RESEC TONSIL/PILLARS       TUBAL LIGATION  2015     wisdom teeth         Family History   Problem Relation Age of Onset     Hypothyroidism Mother      Hashimoto's thyroiditis Father      Multiple Sclerosis Brother      Cushing syndrome Brother      Lung Cancer Brother      Thyroid Disease Brother      Thyroid Disease Brother      Raynaud syndrome Brother      Coronary Artery Disease Maternal Uncle         heart attacks in 60s     Celiac Disease Daughter      Colon Cancer No family hx of      Diabetes No family hx of      Breast Cancer No family hx of        Social History     Tobacco Use     Smoking status: Never     Smokeless tobacco: Never   Substance Use Topics     Alcohol use: Never         Exam:  Vitals: LMP 01/31/2024 (Exact Date)   BMI: There is no height or weight on file to calculate BMI.  Height: Data Unavailable    Constitutional/ general:  Pt is in no apparent distress, appears well-nourished.  Cooperative with history and physical exam.     Psych:  The patient answered questions appropriately.  Normal affect.  Seems to have reasonable expectations, in terms of treatment.     Lungs:  Non labored breathing, non labored speech. No cough.  No audible wheezing. Even, quiet breathing.       Vascular:  Pedal pulses are palpable bilaterally for both the DP and PT arteries.  CFT < 3 sec.  No edema.  No varicosities.  Pedal hair growth noted.     Neuro:  Alert and oriented x 3. Coordinated gait.  Light touch sensation is intact    Derm: Normal texture and turgor.   No erythema, ecchymosis, or cyanosis.  No open lesions.     Musculoskeletal:    Lower extremity muscle strength is normal.  Patient is ambulatory without an assistive device or brace.  No gross deformities.  Normal ROM all fore foot and rearfoot joints except for both first MTPJ's.  With weightbearing patient has mild bilateral pronation with left being worse.  No pain with ROM.   Pain with palpation posterior tibial tendon left side towards insertion medial navicular.  Also pain plantar to medial navicular.  Medial navicular somewhat prominent.  Minimal edema noted.  No erythema or ecchymosis.  Patient can go up on the ball of her foot quite easily with minimal pain.  No pain on the tibialis anterior tendon.    X-rays 4 views taken of the left foot today which shows os tibialis externum      A:    Bilateral pronation left greater than right  Left posterior tibial tendinitis  Left os tibialis externum taken today of left foot.    P: Discussed additional offloading with plantar flexing CAM Walker.  Discussed more arch support with ankle brace inside of cam walker.  For further offloading we gave her prescription for knee walker.  She has not been icing this.  Will ice 2-3 times a day for 20 minutes.  She cannot take NSAIDs.  Discussed topical Voltaren gel may help this.  May do gentle range of motion when not walking.  Discussed MRI for further evaluation to make sure no underlying occult pathology.  She is in agreement.  We placed an order.  Will call her with results.  Discussed if just tendinitis ultrasound-guided injection may be helpful.  Discussed once better good support will be helpful for her.  We wrote a prescription for orthotics.  She called her insurance company and they will pay 80%.    Cesar Medellin DPM, FACFAS             Again, thank you for allowing me to participate in the care of your patient.        Sincerely,        Cesar Medellin DPM

## 2024-04-19 ENCOUNTER — HOSPITAL ENCOUNTER (OUTPATIENT)
Dept: MRI IMAGING | Facility: CLINIC | Age: 39
Discharge: HOME | End: 2024-04-19
Attending: PODIATRIST | Admitting: PODIATRIST
Payer: COMMERCIAL

## 2024-04-19 DIAGNOSIS — M76.822 POSTERIOR TIBIAL TENDINITIS OF LEFT LOWER EXTREMITY: ICD-10-CM

## 2024-04-19 DIAGNOSIS — M21.6X2 PRONATION DEFORMITY OF BOTH FEET: ICD-10-CM

## 2024-04-19 DIAGNOSIS — M21.6X1 PRONATION DEFORMITY OF BOTH FEET: ICD-10-CM

## 2024-04-19 PROCEDURE — 73721 MRI JNT OF LWR EXTRE W/O DYE: CPT | Mod: LT

## 2024-04-19 PROCEDURE — 73721 MRI JNT OF LWR EXTRE W/O DYE: CPT | Mod: 26 | Performed by: RADIOLOGY

## 2024-04-23 ENCOUNTER — TELEPHONE (OUTPATIENT)
Dept: PODIATRY | Facility: CLINIC | Age: 39
End: 2024-04-23
Payer: COMMERCIAL

## 2024-04-23 NOTE — TELEPHONE ENCOUNTER
Called patient with MRI results.  Tenosynovitis but no tear in tendon.  Os tibialis externum noted with bone being inflamed.  Plantar flexing CAM Walker did not help.  Discussed she may have to be nonweightbearing to heal this.  She has a knee walker.  Will be careful not to walk on it at all.  She has crutches at home.  If the cam walker is uncomfortable she could just wear compression or ankle sleeve on this.  Discussed importance of nonweightbearing at all times and that even 15 minutes a day of walking on this may prevent it from healing.  Continue to try to ice 3-4 times a day for 15 minutes.  Again discussed connective tissue disorder may make this slower to heal.  Return to clinic in 3 weeks.

## 2024-04-27 ENCOUNTER — THERAPY VISIT (OUTPATIENT)
Dept: PHYSICAL THERAPY | Facility: CLINIC | Age: 39
End: 2024-04-27
Attending: PODIATRIST
Payer: COMMERCIAL

## 2024-04-27 DIAGNOSIS — M76.822 POSTERIOR TIBIAL TENDINITIS OF LEFT LOWER EXTREMITY: ICD-10-CM

## 2024-04-27 DIAGNOSIS — M21.6X2 PRONATION DEFORMITY OF BOTH FEET: ICD-10-CM

## 2024-04-27 DIAGNOSIS — M79.672 LEFT FOOT PAIN: Primary | ICD-10-CM

## 2024-04-27 DIAGNOSIS — M21.6X1 PRONATION DEFORMITY OF BOTH FEET: ICD-10-CM

## 2024-04-27 PROCEDURE — 97110 THERAPEUTIC EXERCISES: CPT | Mod: GP | Performed by: PHYSICAL THERAPIST

## 2024-04-27 PROCEDURE — 97161 PT EVAL LOW COMPLEX 20 MIN: CPT | Mod: GP | Performed by: PHYSICAL THERAPIST

## 2024-04-27 NOTE — PROGRESS NOTES
PHYSICAL THERAPY EVALUATION  Type of Visit: Evaluation    See electronic medical record for Abuse and Falls Screening details.    Subjective       Presenting condition or subjective complaint: Left foot pain, accessory navicular swelling, tender pain. She was in a CAM boot for about 4-5 weeks without much relief then has been using crutches the last 5 days trying to be non-weight bearing but still no improvement in the pain. If anything, her knees are hurting more.  Date of onset: 12/23/23    Relevant medical history: Migraines or headaches; Severe dizziness; Thyroid problems   Dates & types of surgery: none related to foot    Prior diagnostic imaging/testing results: MRI; X-ray     Prior therapy history for the same diagnosis, illness or injury: No      Prior Level of Function  Transfers:   Ambulation:   ADL:   IADL:     Living Environment  Social support:     Type of home:     Stairs to enter the home:         Ramp:     Stairs inside the home:         Help at home:    Equipment owned:       Employment: No    Hobbies/Interests: Yard work, homeschooling kids, walking, music    Patient goals for therapy: Walk, get off the crutches    Pain assessment: See objective evaluation for additional pain details     Objective   FOOT/ANKLE EVALUATION  PAIN: Pain Level at Rest: 0/10  Pain Level with Use: 7/10  Pain Location: navicular region, medial arch, lower leg  Pain Quality: Sharp  Pain Frequency: intermittent  Pain is Exacerbated By: any weight bearing, walking, time on feet, sensitive to touch  Pain is Relieved By: cold, gentle massage  Pain Progression: Unchanged  Related history: many ankles sprains as a kids. Feels better with shoes with less support. Had stress fractures in both feet during pregnancies in past about 15 years ago.    INTEGUMENTARY (edema, incisions): no swelling  POSTURE:   GAIT:   Weightbearing Status: NWB  Assistive Device(s): Crutches (bilateral)  Gait Deviations:   BALANCE/PROPRIOCEPTION:   WEIGHT  BEARING ALIGNMENT:   NON-WEIGHTBEARING ALIGNMENT:    ROM:   (Degrees) Left AROM Left PROM  Right AROM Right PROM   Ankle Dorsiflexion 16  20    Ankle Plantarflexion       Ankle Inversion WNL  WNL    Ankle Eversion 14  WNL    Great Toe Flexion       Great Toe Extension       Pain:   End feel:     STRENGTH:   Pain: - none + mild ++ moderate +++ severe  Strength Scale: 0-5/5 Left Right   Ankle Dorsiflexion 4 5   Ankle Plantarflexion 4 5   Ankle Inversion 3+, +++ (severe) 5   Ankle Eversion 4 5   Great Toe Flexion     Great Toe Extension     Anterior Tibialis     Posterior Tibialis     Peroneals     Extensor Digitorum     Gastroc/Soleus       FLEXIBILITY:   SPECIAL TESTS:   FUNCTIONAL TESTS:   PALPATION: posterior tibialis tendon, medial navicular region, medial arch all painful with light pressure  JOINT MOBILITY:     Assessment & Plan   CLINICAL IMPRESSIONS  Medical Diagnosis: Os naviculare syndrome    Treatment Diagnosis: Left foot pain, chronic   Impression/Assessment: Patient is a 38 year old female with left medial foot complaints.  The following significant findings have been identified: Pain, Decreased ROM/flexibility, Decreased joint mobility, Decreased strength, Decreased proprioception, Impaired gait, Impaired muscle performance, Decreased activity tolerance, and Impaired posture. These impairments interfere with their ability to perform self care tasks, work tasks, recreational activities, household chores, driving , household mobility, and community mobility as compared to previous level of function.     Clinical Decision Making (Complexity):  Clinical Presentation: Stable/Uncomplicated  Clinical Presentation Rationale: based on medical and personal factors listed in PT evaluation  Clinical Decision Making (Complexity): Low complexity    PLAN OF CARE  Treatment Interventions:  Modalities: Cryotherapy, Dry Needling, E-stim, Hot Pack, Iontophoresis, Ultrasound  Interventions: Manual Therapy, Neuromuscular  Re-education, Therapeutic Activity, Therapeutic Exercise, Self-Care/Home Management    Long Term Goals     PT Goal 1  Goal Identifier: LTG 1  Goal Description: Patient will be able to ambulate 30 minutes with normal gait technique and 3/10 foot pain at worst  Rationale: to maximize safety and independence with performance of ADLs and functional tasks;to maximize safety and independence within the home;to maximize safety and independence within the community;to maximize safety and independence with self cares;to maximize safety and independence with transportation  Target Date: 06/08/24      Frequency of Treatment: 1x/week  Duration of Treatment: 6 visits    Recommended Referrals to Other Professionals:  none  Education Assessment:   Learner/Method: No Barriers to Learning;Pictures/Video;Demonstration;Reading;Listening;Patient    Risks and benefits of evaluation/treatment have been explained.   Patient/Family/caregiver agrees with Plan of Care.     Evaluation Time:     PT Eval, Low Complexity Minutes (09231): 24     Signing Clinician: Saturnino Suazo PT

## 2024-05-03 ENCOUNTER — THERAPY VISIT (OUTPATIENT)
Dept: PHYSICAL THERAPY | Facility: CLINIC | Age: 39
End: 2024-05-03
Attending: PODIATRIST
Payer: COMMERCIAL

## 2024-05-03 DIAGNOSIS — M79.672 LEFT FOOT PAIN: Primary | ICD-10-CM

## 2024-05-03 PROCEDURE — 97110 THERAPEUTIC EXERCISES: CPT | Mod: GP | Performed by: PHYSICAL THERAPIST

## 2024-05-03 PROCEDURE — 97140 MANUAL THERAPY 1/> REGIONS: CPT | Mod: GP | Performed by: PHYSICAL THERAPIST

## 2024-05-10 ENCOUNTER — THERAPY VISIT (OUTPATIENT)
Dept: PHYSICAL THERAPY | Facility: CLINIC | Age: 39
End: 2024-05-10
Attending: PODIATRIST
Payer: COMMERCIAL

## 2024-05-10 DIAGNOSIS — M79.672 LEFT FOOT PAIN: Primary | ICD-10-CM

## 2024-05-10 PROCEDURE — 97035 APP MDLTY 1+ULTRASOUND EA 15: CPT | Mod: GP | Performed by: PHYSICAL THERAPIST

## 2024-05-10 PROCEDURE — 97530 THERAPEUTIC ACTIVITIES: CPT | Mod: GP | Performed by: PHYSICAL THERAPIST

## 2024-05-10 PROCEDURE — 97140 MANUAL THERAPY 1/> REGIONS: CPT | Mod: GP | Performed by: PHYSICAL THERAPIST

## 2024-05-10 PROCEDURE — 97110 THERAPEUTIC EXERCISES: CPT | Mod: GP | Performed by: PHYSICAL THERAPIST

## 2024-05-15 ENCOUNTER — OFFICE VISIT (OUTPATIENT)
Dept: PODIATRY | Facility: CLINIC | Age: 39
End: 2024-05-15
Payer: COMMERCIAL

## 2024-05-15 DIAGNOSIS — M76.822 POSTERIOR TIBIAL TENDINITIS OF LEFT LOWER EXTREMITY: ICD-10-CM

## 2024-05-15 DIAGNOSIS — M21.6X2 PRONATION DEFORMITY OF BOTH FEET: Primary | ICD-10-CM

## 2024-05-15 DIAGNOSIS — M21.6X1 PRONATION DEFORMITY OF BOTH FEET: Primary | ICD-10-CM

## 2024-05-15 PROCEDURE — 99213 OFFICE O/P EST LOW 20 MIN: CPT | Performed by: PODIATRIST

## 2024-05-15 NOTE — PATIENT INSTRUCTIONS
We wish you continued good healing. If you have any questions or concerns, please do not hesitate to contact us at  [phone]    [employer] (secure e-mail communication and access to your chart) to send a message or to make an appointment.    Please remember to call and schedule a follow up appointment if one was recommended at your earliest convenience.     PODIATRY CLINIC HOURS  TELEPHONE NUMBER    Dr. Cesar MARTINPJENNIFER FACFAS        Clinics:  [facility]   [place]  Tuesday 1PM-6PM  [place]  Wednesday 745AM-330PM  [place]  Monday 2nd,4th  830AM-4PM  Thursday/Friday 745AM-230PM     [place] APPOINTMENTS  [phone]    [place] APPOINTMENTS  [phone]          If you need a medication refill, please contact us you may need lab work and/or a follow up visit prior to your refill (i.e. Antifungal medications).  If MRI needed please call Imaging at [phone]   HOW DO I GET MY KNEE SCOOTER? Knee scooters can be picked up at ANY Medical Supply stores with your knee scooter Prescription.  OR  Bring your signed prescription to an [facility] Medical Equipment showroom.   Set up an appointment for your custom Orthotics. Call any Orthotics locations call [phone]

## 2024-05-15 NOTE — PROGRESS NOTES
S:     2/28/24   Patient seen today in consult from Lyudmila Salas and complains of left foot pain.  Points to posterior tibial tendon where is courses around the medial malleoli and especially at insertion.  Has had this for several months.  Pain aggravated by activity and relieved by rest.  She has swelling.  Denies ecchymosis.  Denies numbness weakness or increased deformity.  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  She has a history of Sjogren's.  Denies pain on the contralateral foot.    3/27/24   patient returns for left foot pain.  She has gotten good shoes inside and outside since last visit.  It has not gotten better.  It is gotten worse.  She points to area of the navicular and posterior tibial tendon insertion as to where most the pain is.  Denies any new bruising.    4/10/24 patient returns for left posterior tibial tendinitis.  Last visit we dispensed a cam walker.  Has not really helped.  She is wearing this when walking.  She has not been icing this.  Denies any new edema or ecchymosis.    5/15/24 patient returns for left medial foot pain.  Had MRI since last visit.  MRI showed us inflamed sensory ossicle.  Has been nonweightbearing for 3 weeks.  Some improvement but still painful.  Has been using crutches and knee walker.  Only using knee walker on 1 floor of her house that she lives in a split-level.  States that this morning she had numbness in her arms from the crutches.  Also had numbness on the anterior portion of her left shin.    ROS: See above       Allergies   Allergen Reactions    Bactrim [Sulfamethoxazole-Trimethoprim] Nausea and Vomiting    Imitrex [Sumatriptan] Muscle Pain (Myalgia)    Oxiconazole Nausea and Vomiting    Nickel Swelling and Rash    Penicillins Rash       Current Outpatient Medications   Medication Sig Dispense Refill    levothyroxine (SYNTHROID/LEVOTHROID) 112 MCG tablet Take 1 tablet (112 mcg) by mouth daily 90 tablet 3    multivitamin w/minerals  (MULTI-VITAMIN) tablet Take 1 tablet by mouth daily      valACYclovir (VALTREX) 1000 mg tablet Take 1 tablet (1,000 mg) by mouth 3 times daily for 7 days 21 tablet 0    Vitamin D, Cholecalciferol, 25 MCG (1000 UT) TABS 2 once a day         Patient Active Problem List   Diagnosis    Postsurgical hypothyroidism    Sjogren's syndrome (H24)    Systemic lupus erythematosus, unspecified SLE type, unspecified organ involvement status (H)    History of COVID-19    Abnormal Pap smear of cervix    Unspecified high-risk pregnancy    SS-A antibody positive    Labor and delivery indication for care or intervention    Hypothyroidism    Fetal abnormality affecting management of mother    Left foot pain       Past Medical History:   Diagnosis Date    Sjogren's syndrome (H24)        Past Surgical History:   Procedure Laterality Date    AS RAD RESEC TONSIL/PILLARS      TUBAL LIGATION  2015    wisdom teeth         Family History   Problem Relation Age of Onset    Hypothyroidism Mother     Hashimoto's thyroiditis Father     Multiple Sclerosis Brother     Cushing syndrome Brother     Lung Cancer Brother     Thyroid Disease Brother     Thyroid Disease Brother     Raynaud syndrome Brother     Coronary Artery Disease Maternal Uncle         heart attacks in 60s    Celiac Disease Daughter     Colon Cancer No family hx of     Diabetes No family hx of     Breast Cancer No family hx of        Social History     Tobacco Use    Smoking status: Never    Smokeless tobacco: Never   Substance Use Topics    Alcohol use: Never         Exam:  Vitals: There were no vitals taken for this visit.  BMI: There is no height or weight on file to calculate BMI.  Height: Data Unavailable    Constitutional/ general:  Pt is in no apparent distress, appears well-nourished.  Cooperative with history and physical exam.     Psych:  The patient answered questions appropriately.  Normal affect.  Seems to have reasonable expectations, in terms of treatment.     Lungs:   Non labored breathing, non labored speech. No cough.  No audible wheezing. Even, quiet breathing.       Vascular:  Pedal pulses are palpable bilaterally for both the DP and PT arteries.  CFT < 3 sec.  No edema.  No varicosities.  Pedal hair growth noted.     Neuro:  Alert and oriented x 3. Coordinated gait.  Light touch sensation is intact    Derm: Normal texture and turgor.  No erythema, ecchymosis, or cyanosis.  No open lesions.     Musculoskeletal:    Lower extremity muscle strength is normal.  Patient is ambulatory without an assistive device or brace.  No gross deformities.  Normal ROM all fore foot and rearfoot joints except for both first MTPJ's.  With weightbearing patient has mild bilateral pronation with left being worse.  With palpation medial navicular and posterior tibial tendon in this area slight pain today.  Patient has more pain with eversion of forefoot and rear foot  At midtarsal joint stressing medial navicular.  No edema or erythema x-rays 4 views taken of the left foot today which shows os tibialis externum    Narrative & Impression   Exam: MRI of the left ankle dated 4/19/2024.     COMPARISON: Radiographs dated 3/27/2024.     CLINICAL HISTORY: Pain in the region of the tibialis posterior tendon.     TECHNIQUE: Multiplanar, multisequence MR imaging of the left ankle was  obtained using standard sequences in 3 orthogonal planes without the  use of intravenous or intra-articular gadolinium and contrast.     FINDINGS:     Small amount of fluid in the left ankle joint.     No bone marrow edema to suggest fracture or marrow infiltration.      There is an os navicularis with bone marrow edema in the ossicle.     The distal Achilles tendon is intact. The plantar fascia is  unremarkable. Normal fat in the sinus tarsi.     No osteochondral lesion. The deep and superficial fibers of the  deltoid ligamentous complex as well as the tibial spring components  are intact. The plantar musculature is intact.      The anterior extensor tendons are intact. The Lisfranc ligament is  intact. The lateral peroneal tendons are intact. The medial flexor  tendons are intact. Minimal fluid surrounds the tibialis posterior  tendon.     The anterior and posterior syndesmotic ligaments, anterior and  posterior talofibular ligaments, and calcaneofibular ligaments are  intact.                                                                      IMPRESSION:  1. 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 The  adjacent tibialis posterior tendon is intact however there is mild  surrounding tenosynovitis. Subchondral bone irregularity at the  synchondrosis between the ossicle and the navicular. 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     2. The tendinous and ligamentous structures about the left ankle are  intact.         A:    Bilateral pronation left greater than right  Left posterior tibial tendinitis  Left os navicularis with bone marrow edema    P: Again discussed MRI results and reviewed this with patient.  Pointed out inflamed accessory bone.  She has been nonweightbearing for 3 weeks.  Some improvement.  Discussed this bone is slow to heal.  She is somewhat frustrated with her situation.  Discussed could treat with surgical removal.  She is interested in pursuing this.  Will refer either podiatrist who do rearfoot surgery.  Patient should stop crutches until numbness resolves and arms.  To help resolve numbness and foot will use ankle brace.  The cam walker was uncomfortable.  Discussed knee walker could also be causing pressure on nerve.  RTC as needed.    Cesar Medellin DPM, FACFAS

## 2024-05-15 NOTE — LETTER
5/15/2024         RE: Marlee Lloyd  [address]  [facility] [place] [address]        Dear Colleague,    Thank you for referring your patient, Marlee Lloyd, to the [facility]. Please see a copy of my visit note below.    S:     2/28/24   Patient seen today in consult from Lyudmila Salas and complains of left foot pain.  Points to posterior tibial tendon where is courses around the medial malleoli and especially at insertion.  Has had this for several months.  Pain aggravated by activity and relieved by rest.  She has swelling.  Denies ecchymosis.  Denies numbness weakness or increased deformity.  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  She has a history of Sjogren's.  Denies pain on the contralateral foot.    3/27/24   patient returns for left foot pain.  She has gotten good shoes inside and outside since last visit.  It has not gotten better.  It is gotten worse.  She points to area of the navicular and posterior tibial tendon insertion as to where most the pain is.  Denies any new bruising.    4/10/24 patient returns for left posterior tibial tendinitis.  Last visit we dispensed a cam walker.  Has not really helped.  She is wearing this when walking.  She has not been icing this.  Denies any new edema or ecchymosis.    5/15/24 patient returns for left medial foot pain.  Had MRI since last visit.  MRI showed us inflamed sensory ossicle.  Has been nonweightbearing for 3 weeks.  Some improvement but still painful.  Has been using crutches and knee walker.  Only using knee walker on 1 floor of her house that she lives in a split-level.  States that this morning she had numbness in her arms from the crutches.  Also had numbness on the anterior portion of her left shin.    ROS: See above       Allergies   Allergen Reactions     Bactrim [Sulfamethoxazole-Trimethoprim] Nausea and Vomiting     Imitrex [Sumatriptan] Muscle Pain (Myalgia)     Oxiconazole Nausea and  Vomiting     Nickel Swelling and Rash     Penicillins Rash       Current Outpatient Medications   Medication Sig Dispense Refill     levothyroxine (SYNTHROID/LEVOTHROID) 112 MCG tablet Take 1 tablet (112 mcg) by mouth daily 90 tablet 3     multivitamin w/minerals (MULTI-VITAMIN) tablet Take 1 tablet by mouth daily       valACYclovir (VALTREX) 1000 mg tablet Take 1 tablet (1,000 mg) by mouth 3 times daily for 7 days 21 tablet 0     Vitamin D, Cholecalciferol, 25 MCG (1000 UT) TABS 2 once a day         Patient Active Problem List   Diagnosis     Postsurgical hypothyroidism     Sjogren's syndrome (H24)     Systemic lupus erythematosus, unspecified SLE type, unspecified organ involvement status (H)     History of COVID-19     Abnormal Pap smear of cervix     Unspecified high-risk pregnancy     SS-A antibody positive     Labor and delivery indication for care or intervention     Hypothyroidism     Fetal abnormality affecting management of mother     Left foot pain       Past Medical History:   Diagnosis Date     Sjogren's syndrome (H24)        Past Surgical History:   Procedure Laterality Date     AS RAD RESEC TONSIL/PILLARS       TUBAL LIGATION  2015     wisdom teeth         Family History   Problem Relation Age of Onset     Hypothyroidism Mother      Hashimoto's thyroiditis Father      Multiple Sclerosis Brother      Cushing syndrome Brother      Lung Cancer Brother      Thyroid Disease Brother      Thyroid Disease Brother      Raynaud syndrome Brother      Coronary Artery Disease Maternal Uncle         heart attacks in 60s     Celiac Disease Daughter      Colon Cancer No family hx of      Diabetes No family hx of      Breast Cancer No family hx of        Social History     Tobacco Use     Smoking status: Never     Smokeless tobacco: Never   Substance Use Topics     Alcohol use: Never         Exam:  Vitals: There were no vitals taken for this visit.  BMI: There is no height or weight on file to calculate BMI.  Height:  Data Unavailable    Constitutional/ general:  Pt is in no apparent distress, appears well-nourished.  Cooperative with history and physical exam.     Psych:  The patient answered questions appropriately.  Normal affect.  Seems to have reasonable expectations, in terms of treatment.     Lungs:  Non labored breathing, non labored speech. No cough.  No audible wheezing. Even, quiet breathing.       Vascular:  Pedal pulses are palpable bilaterally for both the DP and PT arteries.  CFT < 3 sec.  No edema.  No varicosities.  Pedal hair growth noted.     Neuro:  Alert and oriented x 3. Coordinated gait.  Light touch sensation is intact    Derm: Normal texture and turgor.  No erythema, ecchymosis, or cyanosis.  No open lesions.     Musculoskeletal:    Lower extremity muscle strength is normal.  Patient is ambulatory without an assistive device or brace.  No gross deformities.  Normal ROM all fore foot and rearfoot joints except for both first MTPJ's.  With weightbearing patient has mild bilateral pronation with left being worse.  With palpation medial navicular and posterior tibial tendon in this area slight pain today.  Patient has more pain with eversion of forefoot and rear foot  At midtarsal joint stressing medial navicular.  No edema or erythema x-rays 4 views taken of the left foot today which shows os tibialis externum    Narrative & Impression   Exam: MRI of the left ankle dated 4/19/2024.     COMPARISON: Radiographs dated 3/27/2024.     CLINICAL HISTORY: Pain in the region of the tibialis posterior tendon.     TECHNIQUE: Multiplanar, multisequence MR imaging of the left ankle was  obtained using standard sequences in 3 orthogonal planes without the  use of intravenous or intra-articular gadolinium and contrast.     FINDINGS:     Small amount of fluid in the left ankle joint.     No bone marrow edema to suggest fracture or marrow infiltration.      There is an os navicularis with bone marrow edema in the ossicle.      The distal Achilles tendon is intact. The plantar fascia is  unremarkable. Normal fat in the sinus tarsi.     No osteochondral lesion. The deep and superficial fibers of the  deltoid ligamentous complex as well as the tibial spring components  are intact. The plantar musculature is intact.     The anterior extensor tendons are intact. The Lisfranc ligament is  intact. The lateral peroneal tendons are intact. The medial flexor  tendons are intact. Minimal fluid surrounds the tibialis posterior  tendon.     The anterior and posterior syndesmotic ligaments, anterior and  posterior talofibular ligaments, and calcaneofibular ligaments are  intact.                                                                      IMPRESSION:  1. 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 The  adjacent tibialis posterior tendon is intact however there is mild  surrounding tenosynovitis. Subchondral bone irregularity at the  synchondrosis between the ossicle and the navicular. 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     2. The tendinous and ligamentous structures about the left ankle are  intact.         A:    Bilateral pronation left greater than right  Left posterior tibial tendinitis  Left os navicularis with bone marrow edema    P: Again discussed MRI results and reviewed this with patient.  Pointed out inflamed accessory bone.  She has been nonweightbearing for 3 weeks.  Some improvement.  Discussed this bone is slow to heal.  She is somewhat frustrated with her situation.  Discussed could treat with surgical removal.  She is interested in pursuing this.  Will refer either podiatrist who do rearfoot surgery.  Patient should stop crutches until numbness resolves and arms.  To help resolve numbness and foot will use ankle brace.  The cam walker was uncomfortable.  Discussed knee walker could also be causing pressure on nerve.  RTC as needed.    Cesar Medellin DPM, FACFAS             Again, thank you for  allowing me to participate in the care of your patient.        Sincerely,        Cesar Medellin DPM

## 2024-05-17 ENCOUNTER — THERAPY VISIT (OUTPATIENT)
Dept: PHYSICAL THERAPY | Facility: CLINIC | Age: 39
End: 2024-05-17
Payer: COMMERCIAL

## 2024-05-17 DIAGNOSIS — M79.672 LEFT FOOT PAIN: Primary | ICD-10-CM

## 2024-05-17 PROCEDURE — 97110 THERAPEUTIC EXERCISES: CPT | Mod: GP | Performed by: PHYSICAL THERAPIST

## 2024-05-20 ENCOUNTER — OFFICE VISIT (OUTPATIENT)
Dept: PODIATRY | Facility: CLINIC | Age: 39
End: 2024-05-20
Attending: PODIATRIST
Payer: COMMERCIAL

## 2024-05-20 VITALS
WEIGHT: 145 LBS | BODY MASS INDEX: 21.98 KG/M2 | DIASTOLIC BLOOD PRESSURE: 76 MMHG | HEIGHT: 68 IN | SYSTOLIC BLOOD PRESSURE: 122 MMHG

## 2024-05-20 DIAGNOSIS — M76.822 POSTERIOR TIBIAL TENDINITIS OF LEFT LOWER EXTREMITY: ICD-10-CM

## 2024-05-20 DIAGNOSIS — M21.6X1 PRONATION DEFORMITY OF BOTH FEET: ICD-10-CM

## 2024-05-20 DIAGNOSIS — M21.6X2 PRONATION DEFORMITY OF BOTH FEET: ICD-10-CM

## 2024-05-20 PROCEDURE — 99214 OFFICE O/P EST MOD 30 MIN: CPT | Performed by: PODIATRIST

## 2024-05-20 NOTE — PATIENT INSTRUCTIONS
Thank you for choosing [facility] / Foot & Ankle Surgery!    DR. LADD'S CLINIC LOCATIONS:     [facility] (Friday) TRIAGE LINE: [phone] [address] [facility]  APPOINTMENTS: [phone]   [place] [address] RADIOLOGY: [phone]    PHYSICAL THERAPY: [phone]    SET UP SURGERY: [phone]   [facility] (Mon-Tues AM-Thurs) BILLING QUESTIONS: [phone]   [address] FAX: [phone]   [place] Patel [address] [facility]: [phone]     You were seen today for the symptomatic os tibial externum.  It looks like you have exhausted all nonsurgical management.  As such, surgical intervention is appropriate.  The surgery is called a Kidner procedure where through an incision along the medial aspect of the ankle, the inflamed accessory bone is removed and the tendon is then advanced into position and held in place with an anchor.  This is a same-day procedure and takes approximately 30 to 45 minutes.    Until sutures are removed (approximate 2 weeks postop), there would be recommendations for elevation/resting and immobilization to allow the incision to heal.  You would also be nonweightbearing for these 2 weeks.  When the sutures are removed at your 2-week postop appointment, you would be cleared to start increasing activities and you would be cleared to start touchdown weightbearing in the boot with crutches or a walker.  If at a 6-week postop appointment, the surgical site appears to be progressing appropriately, you would be cleared to start increasing weight on the foot and would likely be referred to physical therapy to start musculoskeletal functional rehab.  If at a 10-week postop appointment, the surgical site is progressing as expected, you would be cleared to transition back to regular shoes (possibly with a brace).    Surgical planning.  If you have decided to have surgery, follow these few steps to get the procedure scheduled and to have the proper  "paperwork filled out.  If you are unsure about surgery, or would like to sit down and further discuss your issue and treatment options, please make a clinic appointment with Dr Bazan.    1.  Pick the date that you would like to have surgery.  Keep in mind that you will likely need at least 2 weeks off after the procedure for proper rest and healing.    2.  Call the surgery scheduling line at [phone] to get the procedure scheduled.  My  will assist you in getting surgery set up.      3.  Make an appointment to see Dr Bazan within 1-2 weeks of the date of surgery for your pre-operative consult.  When making the appointment, say \"I need to make a 30 minute surgical consult with Dr Bazan\".  All the paperwork will be ready for you during the visit.  It is recommended that you bring a spouse, family member or friend with you.  There will be lots of information presented.  It can be overwhelming, and it is better to have someone there to help sort out the details.    4.  Make an appointment to see your Primary Care Physician within 4 weeks of the date of surgery for your \"Pre-operative History and Physical\".  This is done to make sure you are medically healthy to undergo surgery.        If you have any post-operative questions regarding your procedure, call our triage nurses at [phone], option 3.      "

## 2024-05-20 NOTE — LETTER
"    5/20/2024         RE: Marlee Lloyd  [address]  [place] [address]        Dear Colleague,    Thank you for referring your patient, Marlee Lloyd, to the [facility]. Please see a copy of my visit note below.    Foot & Ankle Surgery   May 20, 2024    S:  Pt is seen today for evaluation of left arch pain.  She has been following with Dr Medellin since February for symptomatic os tibial externum of the left foot.  Pain 8 out of 10 \"constant when moving, frequent at rest\".  \"Boot, brace, ice, 3 1/2 weeks non weightbearing 4 weeks physical therapy\" for treatment.  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  The adjacent tibialis posterior tendon is intact however there is mild surrounding tenosynovitis.  Subchondral bone irregularity at the synchondrosis between the ossicle and the navicular\".  She states symptoms or not improving and her foot in fact hurts more than it had initially.  This has been quite limiting for her.    Vitals:    05/20/24 0801   BP: 122/76   Weight: 65.8 kg (145 lb)   Height: 1.715 m (5' 7.5\")   '      ROS - Pos for CC.  Patient denies current nausea, vomiting, chills, fevers, belly pain, calf pain, chest pain or SOB.  Complete remainder of ROS it otherwise neg.      PE:  Gen:   No apparent distress  Eye:    Visual scanning without deficit  Ear:    Response to auditory stimuli wnl  Lung:    Non-labored breathing on RA noted  Abd:    NTND per patient report  Lymph:    Neg for pitting/non-pitting edema BLE  Vasc:    Pulses palpable, CFT minimally delayed  Neuro:    Light touch sensation intact to all sensory nerve distributions without paresthesias  Derm:    Neg for nodules, lesions or ulcerations  MSK:    While she states that pain can radiate across the top of the foot and up the medial ankle, the main area of pain is at the os tibial externum.  The PT tendon shows minimal pain on palpation or " degenerative changes.  Calf:    Neg for redness, swelling or tenderness      Imaging: MRI left ankle 4/19/2024 - IMPRESSION:  1. 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 The  adjacent tibialis posterior tendon is intact however there is mild  surrounding tenosynovitis. Subchondral bone irregularity at the  synchondrosis between the ossicle and the navicular. 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     2. The tendinous and ligamentous structures about the left ankle are  intact.    Assessment:  38 year old female with symptomatic os tibial externum left lower extremity      Medical Decision Making/Plan:  Discussed etiologies, anatomy and options  1.  Symptomatic os tibial externum left lower extremity  -I personally interpreted and reviewed her x-rays.  Alignment appears unremarkable without pes planus or medial arch collapse  -I personally interpreted and reviewed the MRI results.  -Conservatively, we discussed shoes, inserts, walking boot, physical therapy.  She has done appropriate nonsurgical management without sufficient improvement  -We reviewed a Kidner procedure including procedure and postop recovery for patient information and planning  -Our surgery planning handout was dispensed      Job duties; time off work - [employer] 4 kids;   Smoking history - neg  Vit D - n/a  Diabetic/A1c - neg  Hemoglobin - draw prior  Clot history - neg/neg  Allergies to surgical implants/suture - Ni  Allergies/issues with narcotics - GI issues with NSAIDs; oxycodone N/V    Procedure(s):  1.  Left Kidner procedure  Consent: above  Diagnosis:  symptomatic os tibiale externum  Equipment/Vendor: [employer] soft tissue anchor    Pain Med Plan:  norco, atarax  DVT prophylaxis:  Mechanical  WB Status Post-op:  NWB x 2 weeks; TDWB thereafter  WB Device:  boot, walker  Vit D Supplementation:  n/a      Follow up:  prn or sooner with acute issues           Gold Bazan DPM FACFAS FACFAOM  Podiatric Foot  & Ankle Surgeon  [facility]  [phone]    Disclaimer: This note consists of symbols derived from keyboarding, dictation and/or voice recognition software. As a result, there may be errors in the script that have gone undetected. Please consider this when interpreting information found in this chart.        Again, thank you for allowing me to participate in the care of your patient.        Sincerely,        Gold Bazan DPM, ANNETTA

## 2024-05-20 NOTE — PROGRESS NOTES
"Foot & Ankle Surgery   May 20, 2024    S:  Pt is seen today for evaluation of left arch pain.  She has been following with Dr Medellin since February for symptomatic os tibial externum of the left foot.  Pain 8 out of 10 \"constant when moving, frequent at rest\".  \"Boot, brace, ice, 3 1/2 weeks non weightbearing 4 weeks physical therapy\" for treatment.  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  The adjacent tibialis posterior tendon is intact however there is mild surrounding tenosynovitis.  Subchondral bone irregularity at the synchondrosis between the ossicle and the navicular\".  She states symptoms or not improving and her foot in fact hurts more than it had initially.  This has been quite limiting for her.    Vitals:    05/20/24 0801   BP: 122/76   Weight: 65.8 kg (145 lb)   Height: 1.715 m (5' 7.5\")   '      ROS - Pos for CC.  Patient denies current nausea, vomiting, chills, fevers, belly pain, calf pain, chest pain or SOB.  Complete remainder of ROS it otherwise neg.      PE:  Gen:   No apparent distress  Eye:    Visual scanning without deficit  Ear:    Response to auditory stimuli wnl  Lung:    Non-labored breathing on RA noted  Abd:    NTND per patient report  Lymph:    Neg for pitting/non-pitting edema BLE  Vasc:    Pulses palpable, CFT minimally delayed  Neuro:    Light touch sensation intact to all sensory nerve distributions without paresthesias  Derm:    Neg for nodules, lesions or ulcerations  MSK:    While she states that pain can radiate across the top of the foot and up the medial ankle, the main area of pain is at the os tibial externum.  The PT tendon shows minimal pain on palpation or degenerative changes.  Calf:    Neg for redness, swelling or tenderness      Imaging: MRI left ankle 4/19/2024 - IMPRESSION:  1. 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 The  adjacent tibialis posterior tendon is intact however there is " mild  surrounding tenosynovitis. Subchondral bone irregularity at the  synchondrosis between the ossicle and the navicular. 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     2. The tendinous and ligamentous structures about the left ankle are  intact.    Assessment:  38 year old female with symptomatic os tibial externum left lower extremity      Medical Decision Making/Plan:  Discussed etiologies, anatomy and options  1.  Symptomatic os tibial externum left lower extremity  -I personally interpreted and reviewed her x-rays.  Alignment appears unremarkable without pes planus or medial arch collapse  -I personally interpreted and reviewed the MRI results.  -Conservatively, we discussed shoes, inserts, walking boot, physical therapy.  She has done appropriate nonsurgical management without sufficient improvement  -We reviewed a Kidner procedure including procedure and postop recovery for patient information and planning  -Our surgery planning handout was dispensed      Job duties; time off work - [employer] 4 kids;   Smoking history - neg  Vit D - n/a  Diabetic/A1c - neg  Hemoglobin - draw prior  Clot history - neg/neg  Allergies to surgical implants/suture - Ni  Allergies/issues with narcotics - GI issues with NSAIDs; oxycodone N/V    Procedure(s):  1.  Left Kidner procedure  Consent: above  Diagnosis:  symptomatic os tibiale externum  Equipment/Vendor: Arthrex soft tissue anchor    Pain Med Plan:  norco, atarax  DVT prophylaxis:  Mechanical  WB Status Post-op:  NWB x 2 weeks; TDWB thereafter  WB Device:  boot, walker  Vit D Supplementation:  n/a      Follow up:  prn or sooner with acute issues           Gold Bazan DPM FACFAS FACFAOM  Podiatric Foot & Ankle Surgeon  [facility]  [phone]    Disclaimer: This note consists of symbols derived from keyboarding, dictation and/or voice recognition software. As a result, there may be errors in the script that have gone undetected. Please  consider this when interpreting information found in this chart.

## 2024-05-21 ENCOUNTER — TELEPHONE (OUTPATIENT)
Dept: PODIATRY | Facility: CLINIC | Age: 39
End: 2024-05-21
Payer: COMMERCIAL

## 2024-05-22 ENCOUNTER — PREP FOR PROCEDURE (OUTPATIENT)
Dept: OTHER | Facility: CLINIC | Age: 39
End: 2024-05-22
Payer: COMMERCIAL

## 2024-05-22 DIAGNOSIS — M76.822 POSTERIOR TIBIAL TENDON DYSFUNCTION (PTTD) OF LEFT LOWER EXTREMITY: Primary | ICD-10-CM

## 2024-05-22 NOTE — PROGRESS NOTES
"Order signed for \"left Kidner procedure\"    General with popliteal/saphenous nerve block    Supine    Arthrex soft tissue anchor    Gold Bazan DPM FACFAS FACFAOM  Podiatric Foot & Ankle Surgeon  [facility]  [phone]    "

## 2024-06-07 ENCOUNTER — OFFICE VISIT (OUTPATIENT)
Dept: FAMILY MEDICINE | Facility: CLINIC | Age: 39
End: 2024-06-07
Payer: COMMERCIAL

## 2024-06-07 VITALS
BODY MASS INDEX: 23.42 KG/M2 | HEART RATE: 53 BPM | TEMPERATURE: 97.1 F | HEIGHT: 68 IN | WEIGHT: 154.5 LBS | OXYGEN SATURATION: 100 % | DIASTOLIC BLOOD PRESSURE: 82 MMHG | SYSTOLIC BLOOD PRESSURE: 131 MMHG | RESPIRATION RATE: 12 BRPM

## 2024-06-07 DIAGNOSIS — Z01.818 PREOP GENERAL PHYSICAL EXAM: Primary | ICD-10-CM

## 2024-06-07 DIAGNOSIS — M76.822 POSTERIOR TIBIAL TENDON DYSFUNCTION (PTTD) OF LEFT LOWER EXTREMITY: ICD-10-CM

## 2024-06-07 DIAGNOSIS — M79.672 LEFT FOOT PAIN: ICD-10-CM

## 2024-06-07 LAB
BASOPHILS # BLD AUTO: 0.1 10E3/UL (ref 0–0.2)
BASOPHILS NFR BLD AUTO: 1 %
EOSINOPHIL # BLD AUTO: 0.1 10E3/UL (ref 0–0.7)
EOSINOPHIL NFR BLD AUTO: 2 %
ERYTHROCYTE [DISTWIDTH] IN BLOOD BY AUTOMATED COUNT: 11.8 % (ref 10–15)
HCT VFR BLD AUTO: 36.8 % (ref 35–47)
HGB BLD-MCNC: 12.6 G/DL (ref 11.7–15.7)
IMM GRANULOCYTES # BLD: 0 10E3/UL
IMM GRANULOCYTES NFR BLD: 0 %
LYMPHOCYTES # BLD AUTO: 2.9 10E3/UL (ref 0.8–5.3)
LYMPHOCYTES NFR BLD AUTO: 33 %
MCH RBC QN AUTO: 30.9 PG (ref 26.5–33)
MCHC RBC AUTO-ENTMCNC: 34.2 G/DL (ref 31.5–36.5)
MCV RBC AUTO: 90 FL (ref 78–100)
MONOCYTES # BLD AUTO: 0.7 10E3/UL (ref 0–1.3)
MONOCYTES NFR BLD AUTO: 7 %
NEUTROPHILS # BLD AUTO: 5.1 10E3/UL (ref 1.6–8.3)
NEUTROPHILS NFR BLD AUTO: 58 %
PLATELET # BLD AUTO: 256 10E3/UL (ref 150–450)
RBC # BLD AUTO: 4.08 10E6/UL (ref 3.8–5.2)
WBC # BLD AUTO: 8.9 10E3/UL (ref 4–11)

## 2024-06-07 PROCEDURE — 99214 OFFICE O/P EST MOD 30 MIN: CPT | Performed by: PHYSICIAN ASSISTANT

## 2024-06-07 PROCEDURE — 36415 COLL VENOUS BLD VENIPUNCTURE: CPT | Performed by: PHYSICIAN ASSISTANT

## 2024-06-07 PROCEDURE — 85025 COMPLETE CBC W/AUTO DIFF WBC: CPT | Performed by: PHYSICIAN ASSISTANT

## 2024-06-07 ASSESSMENT — PAIN SCALES - GENERAL: PAINLEVEL: MILD PAIN (2)

## 2024-06-07 NOTE — RESULT ENCOUNTER NOTE
Dear Marlee  Your blood counts and hemoglobin were normal.    Please call or MyChart my office with any questions or concerns.   Lyudmila Salas, PAC

## 2024-06-07 NOTE — PROGRESS NOTES
Preoperative Evaluation  [address] [address]  Phone: [phone]  Primary Provider: Lyudmila Salas PA-C  Pre-op Performing Provider: Lyudmila Salas PA-C  Jun 7, 2024 6/7/2024   Surgical Information   What procedure is being done? [facility] or Hospital where procedure/surgery will be performed: [facility]   Who is doing the procedure / surgery? dr tess morillo   Date of surgery / procedure: june 26   Time of surgery / procedure: dont know   Where do you plan to recover after surgery? at home with family     Fax number for surgical facility: Note does not need to be faxed, will be available electronically in Epic.    Assessment & Plan     The proposed surgical procedure is considered INTERMEDIATE risk.    Preop general physical exam  Patient is medically optimized for surgery   - CBC with platelets and differential  - CBC with platelets and differential    Left foot pain  Reason for surgery     Posterior tibial tendon dysfunction (PTTD) of left lower extremity  Reason for surgery               - No identified additional risk factors other than previously addressed    Antiplatelet or Anticoagulation Medication Instructions   - Patient is on no antiplatelet or anticoagulation medications.    Additional Medication Instructions  Take all scheduled medications on the day of surgery EXCEPT for modifications listed below:   - Herbal medications and vitamins: DO NOT TAKE 14 days prior to surgery.    Recommendation  Approval given to proceed with proposed procedure, without further diagnostic evaluation.        Anita Whalen is a 38 year old, presenting for the following:  No chief complaint on file.          6/7/2024    10:31 AM   Additional Questions   Roomed by Laura   Accompanied by self     HPI related to upcoming procedure: has had pain of left foot for more than 4 months.  Tried boots and crutches without  success and activity very limited by pain.          6/7/2024   Pre-Op Questionnaire   Have you ever had a heart attack or stroke? No   Have you ever had surgery on your heart or blood vessels, such as a stent placement, a coronary artery bypass, or surgery on an artery in your head, neck, heart, or legs? No   Do you have chest pain with activity? No   Do you have a history of heart failure? No   Do you currently have a cold, bronchitis or symptoms of other infection? No   Do you have a cough, shortness of breath, or wheezing? No   Do you or anyone in your family have previous history of blood clots? (NO- father with CVA and atrial fibrillation in his 70s     Do you or does anyone in your family have a serious bleeding problem such as prolonged bleeding following surgeries or cuts? No   Have you ever had problems with anemia or been told to take iron pills? No   Have you had any abnormal blood loss such as black, tarry or bloody stools, or abnormal vaginal bleeding? No   Have you ever had a blood transfusion? No   Are you willing to have a blood transfusion if it is medically needed before, during, or after your surgery? Yes   Have you or any of your relatives ever had problems with anesthesia? (!) YES father coded after surgery  Patient gets Super nauseated and dizzy   Do you have sleep apnea, excessive snoring or daytime drowsiness? No   Do you have any artifical heart valves or other implanted medical devices like a pacemaker, defibrillator, or continuous glucose monitor? No   Do you have artificial joints? No   Are you allergic to latex? No     Health Care Directive  Patient does not have a Health Care Directive or Living Will: Discussed advance care planning with patient; however, patient declined at this time.    Preoperative Review of    reviewed - no record of controlled substances prescribed.          Patient Active Problem List    Diagnosis Date Noted    Left foot pain 04/27/2024     Priority: Medium  "   Abnormal Pap smear of cervix 03/27/2024     Priority: Medium     Formatting of this note might be different from the original.   10/26/2011 Abnormal pap \"2 in past, repeats normal\" noted in scans dated 4/15/2015    06/20/2012 NIL   10/19/2020 NIL/HPV negative       Plan Routine screening      Systemic lupus erythematosus, unspecified SLE type, unspecified organ involvement status (H) 02/20/2024     Priority: Medium    Postsurgical hypothyroidism 12/23/2022     Priority: Medium    Sjogren's syndrome (H24)      Priority: Medium    History of COVID-19 01/26/2022     Priority: Medium     Formatting of this note might be different from the original.   01/15/2022      Labor and delivery indication for care or intervention 04/10/2015     Priority: Medium    Fetal abnormality affecting management of mother 01/16/2015     Priority: Medium    Unspecified high-risk pregnancy 09/09/2014     Priority: Medium     Formatting of this note might be different from the original.   REASON FOR CONSULT: Previous Patient                          PRIMARY DIAGNOSIS: +SSA, + SSB antibodies          SLE         Sjogrens Syndrome          Hx of LEEP         Hx of thyroidectomy         Hashimotos      LAST GROWTH:      TESTING PLANS:       JUNIE:      REFERRING PHYSICIAN & PHONE:  Previous patient      SPECIALISTS: Dr Og- rheumatologist      MEDS:       BLOOD TYPE/LABS:      GENETICS: DATE:                 COUNSELOR:                PLAN:      PLAN OF CARE:      SS-A antibody positive 11/14/2012     Priority: Medium    Hypothyroidism 10/26/2010     Priority: Medium      Past Medical History:   Diagnosis Date    Sjogren's syndrome (H24)      Past Surgical History:   Procedure Laterality Date    AS RAD RESEC TONSIL/PILLARS      TUBAL LIGATION  2015    wisdom teeth       Current Outpatient Medications   Medication Sig Dispense Refill    levothyroxine (SYNTHROID/LEVOTHROID) 112 MCG tablet Take 1 tablet (112 mcg) by mouth daily 90 tablet 3    " multivitamin w/minerals (MULTI-VITAMIN) tablet Take 1 tablet by mouth daily      Vitamin D, Cholecalciferol, 25 MCG (1000 UT) TABS 2 once a day         Allergies   Allergen Reactions    Bactrim [Sulfamethoxazole-Trimethoprim] Nausea and Vomiting    Imitrex [Sumatriptan] Muscle Pain (Myalgia)    Oxiconazole Nausea and Vomiting    Nickel Swelling and Rash    Penicillins Rash        Social History     Tobacco Use    Smoking status: Never    Smokeless tobacco: Never   Substance Use Topics    Alcohol use: Never     Family History   Problem Relation Age of Onset    Hypothyroidism Mother     Hashimoto's thyroiditis Father     Atrial fibrillation Father 74    Cerebrovascular Disease Father 74    Multiple Sclerosis Brother     Cushing syndrome Brother     Lung Cancer Brother     Thyroid Disease Brother     Thyroid Disease Brother     Raynaud syndrome Brother     Celiac Disease Daughter     Coronary Artery Disease Maternal Uncle         heart attacks in 60s    Colon Cancer No family hx of     Diabetes No family hx of     Breast Cancer No family hx of      History   Drug Use Unknown             Review of Systems  CONSTITUTIONAL: NEGATIVE for fever, chills, change in weight  INTEGUMENTARY/SKIN: NEGATIVE for worrisome rashes, moles or lesions  EYES: NEGATIVE for vision changes or irritation  ENT/MOUTH: NEGATIVE for ear, mouth and throat problems  RESP: NEGATIVE for significant cough or SOB  BREAST: NEGATIVE for masses, tenderness or discharge  CV: NEGATIVE for chest pain, palpitations or peripheral edema  GI: NEGATIVE for nausea, abdominal pain, heartburn, or change in bowel habits  : NEGATIVE for frequency, dysuria, or hematuria  MUSCULOSKELETAL:see hpi   NEURO: NEGATIVE for weakness, dizziness or paresthesias  ENDOCRINE: NEGATIVE for temperature intolerance, skin/hair changes  HEME: NEGATIVE for bleeding problems  PSYCHIATRIC: NEGATIVE for changes in mood or affect    Objective    /82 (BP Location: Right arm, Patient  "Position: Sitting, Cuff Size: Adult Regular)   Pulse 53   Temp 97.1  F (36.2  C) (Temporal)   Resp 12   Ht 1.715 m (5' 7.5\")   Wt 70.1 kg (154 lb 8 oz)   LMP 06/06/2024 (Exact Date)   SpO2 100%   BMI 23.84 kg/m     Estimated body mass index is 23.84 kg/m  as calculated from the following:    Height as of this encounter: 1.715 m (5' 7.5\").    Weight as of this encounter: 70.1 kg (154 lb 8 oz).  Physical Exam  GENERAL: alert and no distress  EYES: Eyes grossly normal to inspection, PERRL and conjunctivae and sclerae normal  HENT: ear canals and TM's normal, nose and mouth without ulcers or lesions  NECK: no adenopathy, no asymmetry, masses, or scars  RESP: lungs clear to auscultation - no rales, rhonchi or wheezes  CV: regular rate and rhythm, normal S1 S2, no S3 or S4, no murmur, click or rub, no peripheral edema  ABDOMEN: soft, nontender, no hepatosplenomegaly, no masses and bowel sounds normal  MS: no gross musculoskeletal defects noted   SKIN: no suspicious lesions or rashes  NEURO: Normal strength and tone, mentation intact and speech normal  PSYCH: mentation appears normal, affect normal/bright    Recent Labs   Lab Test 02/20/24  0821      POTASSIUM 3.9   CR 0.81        Diagnostics  Recent Results (from the past 24 hour(s))   CBC with platelets and differential    Collection Time: 06/07/24 10:52 AM   Result Value Ref Range    WBC Count 8.9 4.0 - 11.0 10e3/uL    RBC Count 4.08 3.80 - 5.20 10e6/uL    Hemoglobin 12.6 11.7 - 15.7 g/dL    Hematocrit 36.8 35.0 - 47.0 %    MCV 90 78 - 100 fL    MCH 30.9 26.5 - 33.0 pg    MCHC 34.2 31.5 - 36.5 g/dL    RDW 11.8 10.0 - 15.0 %    Platelet Count 256 150 - 450 10e3/uL    % Neutrophils 58 %    % Lymphocytes 33 %    % Monocytes 7 %    % Eosinophils 2 %    % Basophils 1 %    % Immature Granulocytes 0 %    Absolute Neutrophils 5.1 1.6 - 8.3 10e3/uL    Absolute Lymphocytes 2.9 0.8 - 5.3 10e3/uL    Absolute Monocytes 0.7 0.0 - 1.3 10e3/uL    Absolute Eosinophils 0.1 " 0.0 - 0.7 10e3/uL    Absolute Basophils 0.1 0.0 - 0.2 10e3/uL    Absolute Immature Granulocytes 0.0 <=0.4 10e3/uL      No EKG required, no history of coronary heart disease, significant arrhythmia, peripheral arterial disease or other structural heart disease.    Revised Cardiac Risk Index (RCRI)  The patient has the following serious cardiovascular risks for perioperative complications:   - No serious cardiac risks = 0 points     RCRI Interpretation: 0 points: Class I (very low risk - 0.4% complication rate)         Signed Electronically by: Lyudmila Salas PA-C  Copy of this evaluation report is provided to requesting physician.

## 2024-06-07 NOTE — PATIENT INSTRUCTIONS

## 2024-06-25 ENCOUNTER — ANESTHESIA EVENT (OUTPATIENT)
Dept: SURGERY | Facility: CLINIC | Age: 39
End: 2024-06-25
Payer: COMMERCIAL

## 2024-06-25 NOTE — ANESTHESIA PREPROCEDURE EVALUATION
Anesthesia Pre-Procedure Evaluation    Patient: Marlee Lloyd   MRN: [id number] : 1985        Procedure : Procedure(s):  LEFT KIDNER PROCEDURE          Past Medical History:   Diagnosis Date     Sjogren's syndrome (H24)       Past Surgical History:   Procedure Laterality Date     AS RAD RESEC TONSIL/PILLARS       TUBAL LIGATION  2015     wisdom teeth        Allergies   Allergen Reactions     Bactrim [Sulfamethoxazole-Trimethoprim] Nausea and Vomiting     Imitrex [Sumatriptan] Muscle Pain (Myalgia)     Oxiconazole Nausea and Vomiting     Nickel Swelling and Rash     Penicillins Rash      Social History     Tobacco Use     Smoking status: Never     Smokeless tobacco: Never   Substance Use Topics     Alcohol use: Never      Wt Readings from Last 1 Encounters:   24 70.1 kg (154 lb 8 oz)        Anesthesia Evaluation            ROS/MED HX  ENT/Pulmonary: Comment: Sjogren's - neg pulmonary ROS  (-) sleep apnea   Neurologic:  - neg neurologic ROS     Cardiovascular:       METS/Exercise Tolerance:     Hematologic: Comments: SLE      Musculoskeletal:       GI/Hepatic:       Renal/Genitourinary:       Endo:     (+)          thyroid problem, hypothyroidism,           Psychiatric/Substance Use:       Infectious Disease:       Malignancy:       Other:            Physical Exam    Airway        Mallampati: II   TM distance: > 3 FB   Neck ROM: full   Mouth opening: > 3 cm    Respiratory Devices and Support         Dental       (+) Minor Abnormalities - some fillings, tiny chips      Cardiovascular   cardiovascular exam normal          Pulmonary   pulmonary exam normal            OUTSIDE LABS:  CBC:   Lab Results   Component Value Date    WBC 8.9 2024    HGB 12.6 2024    HCT 36.8 2024     2024     BMP:   Lab Results   Component Value Date     2024     2022    POTASSIUM 3.9 2024    POTASSIUM 3.7 2022    CHLORIDE 104 2024    CHLORIDE 107  "12/23/2022    CO2 25 02/20/2024    CO2 30 12/23/2022    BUN 13.3 02/20/2024    BUN 10 12/23/2022    CR 0.81 02/20/2024    CR 0.70 12/23/2022    GLC 84 02/20/2024    GLC 91 12/23/2022     COAGS: No results found for: \"PTT\", \"INR\", \"FIBR\"  POC: No results found for: \"BGM\", \"HCG\", \"HCGS\"  HEPATIC:   Lab Results   Component Value Date    ALBUMIN 4.6 02/20/2024    PROTTOTAL 6.9 02/20/2024    ALT 18 02/20/2024    AST 22 02/20/2024    ALKPHOS 46 02/20/2024    BILITOTAL 0.7 02/20/2024     OTHER:   Lab Results   Component Value Date    STEFAN 9.3 02/20/2024    TSH 1.42 02/20/2024    T4 1.24 12/23/2022       Anesthesia Plan    ASA Status:  2    NPO Status:  NPO Appropriate    Anesthesia Type: General.     - Airway: LMA   Induction: Intravenous, Propofol.   Maintenance: Balanced.        Consents    Anesthesia Plan(s) and associated risks, benefits, and realistic alternatives discussed. Questions answered and patient/representative(s) expressed understanding.     - Discussed:     - Discussed with:  Patient            Postoperative Care    Pain management: Peripheral nerve block (Single Shot), Multi-modal analgesia.   PONV prophylaxis: Ondansetron (or other 5HT-3), Dexamethasone or Solumedrol, Background Propofol Infusion     Comments:               Roberto Murcia, DO, DO    I have reviewed the pertinent notes and labs in the chart from the past 30 days and (re)examined the patient.  Any updates or changes from those notes are reflected in this note.                  "

## 2024-06-26 ENCOUNTER — ANESTHESIA (OUTPATIENT)
Dept: SURGERY | Facility: CLINIC | Age: 39
End: 2024-06-26
Payer: COMMERCIAL

## 2024-06-26 ENCOUNTER — DOCUMENTATION ONLY (OUTPATIENT)
Dept: ORTHOPEDICS | Facility: CLINIC | Age: 39
End: 2024-06-26

## 2024-06-26 ENCOUNTER — HOSPITAL ENCOUNTER (OUTPATIENT)
Facility: CLINIC | Age: 39
Discharge: HOME | End: 2024-06-26
Attending: PODIATRIST | Admitting: PODIATRIST
Payer: COMMERCIAL

## 2024-06-26 VITALS
HEART RATE: 58 BPM | TEMPERATURE: 99 F | RESPIRATION RATE: 15 BRPM | DIASTOLIC BLOOD PRESSURE: 70 MMHG | WEIGHT: 151.6 LBS | OXYGEN SATURATION: 98 % | HEIGHT: 67 IN | BODY MASS INDEX: 23.79 KG/M2 | SYSTOLIC BLOOD PRESSURE: 119 MMHG

## 2024-06-26 DIAGNOSIS — Z98.890 S/P FOOT SURGERY, LEFT: Primary | ICD-10-CM

## 2024-06-26 DIAGNOSIS — M76.822 POSTERIOR TIBIAL TENDON DYSFUNCTION (PTTD) OF LEFT LOWER EXTREMITY: ICD-10-CM

## 2024-06-26 LAB — HCG UR QL: NEGATIVE

## 2024-06-26 PROCEDURE — 250N000011 HC RX IP 250 OP 636: Mod: JZ | Performed by: ANESTHESIOLOGY

## 2024-06-26 PROCEDURE — 999N000141 HC STATISTIC PRE-PROCEDURE NURSING ASSESSMENT: Performed by: PODIATRIST

## 2024-06-26 PROCEDURE — 258N000003 HC RX IP 258 OP 636: Performed by: ANESTHESIOLOGY

## 2024-06-26 PROCEDURE — L4361 PNEUMA/VAC WALK BOOT PRE OTS: HCPCS

## 2024-06-26 PROCEDURE — 81025 URINE PREGNANCY TEST: CPT | Performed by: ANESTHESIOLOGY

## 2024-06-26 PROCEDURE — 250N000011 HC RX IP 250 OP 636: Performed by: PODIATRIST

## 2024-06-26 PROCEDURE — C1713 ANCHOR/SCREW BN/BN,TIS/BN: HCPCS | Performed by: PODIATRIST

## 2024-06-26 PROCEDURE — 28238 REVISION OF FOOT TENDON: CPT | Mod: LT | Performed by: PODIATRIST

## 2024-06-26 PROCEDURE — 710N000012 HC RECOVERY PHASE 2, PER MINUTE: Performed by: PODIATRIST

## 2024-06-26 PROCEDURE — 710N000009 HC RECOVERY PHASE 1, LEVEL 1, PER MIN: Performed by: PODIATRIST

## 2024-06-26 PROCEDURE — 272N000001 HC OR GENERAL SUPPLY STERILE: Performed by: PODIATRIST

## 2024-06-26 PROCEDURE — 250N000009 HC RX 250: Performed by: PODIATRIST

## 2024-06-26 PROCEDURE — 28238 REVISION OF FOOT TENDON: CPT | Performed by: ANESTHESIOLOGY

## 2024-06-26 PROCEDURE — 360N000075 HC SURGERY LEVEL 2, PER MIN: Performed by: PODIATRIST

## 2024-06-26 PROCEDURE — 271N000001 HC OR GENERAL SUPPLY NON-STERILE: Performed by: PODIATRIST

## 2024-06-26 PROCEDURE — 370N000017 HC ANESTHESIA TECHNICAL FEE, PER MIN: Performed by: PODIATRIST

## 2024-06-26 PROCEDURE — 250N000009 HC RX 250: Performed by: ANESTHESIOLOGY

## 2024-06-26 PROCEDURE — 28238 REVISION OF FOOT TENDON: CPT

## 2024-06-26 DEVICE — DX FIBERTAK SUTURE ANCHOR, ST & NDLS
Type: IMPLANTABLE DEVICE | Site: FOOT | Status: FUNCTIONAL
Brand: ARTHREX®

## 2024-06-26 RX ORDER — FENTANYL CITRATE 0.05 MG/ML
50 INJECTION, SOLUTION INTRAMUSCULAR; INTRAVENOUS EVERY 5 MIN PRN
Status: DISCONTINUED | OUTPATIENT
Start: 2024-06-26 | End: 2024-06-26 | Stop reason: HOSPADM

## 2024-06-26 RX ORDER — ONDANSETRON 4 MG/1
4 TABLET, ORALLY DISINTEGRATING ORAL EVERY 30 MIN PRN
Status: DISCONTINUED | OUTPATIENT
Start: 2024-06-26 | End: 2024-06-26 | Stop reason: HOSPADM

## 2024-06-26 RX ORDER — LIDOCAINE HYDROCHLORIDE 20 MG/ML
INJECTION, SOLUTION INFILTRATION; PERINEURAL PRN
Status: DISCONTINUED | OUTPATIENT
Start: 2024-06-26 | End: 2024-06-26

## 2024-06-26 RX ORDER — LIDOCAINE HYDROCHLORIDE 10 MG/ML
INJECTION, SOLUTION EPIDURAL; INFILTRATION; INTRACAUDAL; PERINEURAL
Status: DISCONTINUED
Start: 2024-06-26 | End: 2024-06-26 | Stop reason: HOSPADM

## 2024-06-26 RX ORDER — ONDANSETRON 2 MG/ML
4 INJECTION INTRAMUSCULAR; INTRAVENOUS EVERY 30 MIN PRN
Status: DISCONTINUED | OUTPATIENT
Start: 2024-06-26 | End: 2024-06-26 | Stop reason: HOSPADM

## 2024-06-26 RX ORDER — MAGNESIUM HYDROXIDE 1200 MG/15ML
LIQUID ORAL PRN
Status: DISCONTINUED | OUTPATIENT
Start: 2024-06-26 | End: 2024-06-26 | Stop reason: HOSPADM

## 2024-06-26 RX ORDER — HYDROMORPHONE HCL IN WATER/PF 6 MG/30 ML
0.2 PATIENT CONTROLLED ANALGESIA SYRINGE INTRAVENOUS EVERY 5 MIN PRN
Status: DISCONTINUED | OUTPATIENT
Start: 2024-06-26 | End: 2024-06-26 | Stop reason: HOSPADM

## 2024-06-26 RX ORDER — HYDROCODONE BITARTRATE AND ACETAMINOPHEN 5; 325 MG/1; MG/1
1 TABLET ORAL
Status: DISCONTINUED | OUTPATIENT
Start: 2024-06-26 | End: 2024-06-26 | Stop reason: HOSPADM

## 2024-06-26 RX ORDER — PROPOFOL 10 MG/ML
INJECTION, EMULSION INTRAVENOUS CONTINUOUS PRN
Status: DISCONTINUED | OUTPATIENT
Start: 2024-06-26 | End: 2024-06-26

## 2024-06-26 RX ORDER — NALOXONE HYDROCHLORIDE 0.4 MG/ML
0.1 INJECTION, SOLUTION INTRAMUSCULAR; INTRAVENOUS; SUBCUTANEOUS
Status: DISCONTINUED | OUTPATIENT
Start: 2024-06-26 | End: 2024-06-26 | Stop reason: HOSPADM

## 2024-06-26 RX ORDER — SODIUM CHLORIDE, SODIUM LACTATE, POTASSIUM CHLORIDE, CALCIUM CHLORIDE 600; 310; 30; 20 MG/100ML; MG/100ML; MG/100ML; MG/100ML
INJECTION, SOLUTION INTRAVENOUS CONTINUOUS
Status: DISCONTINUED | OUTPATIENT
Start: 2024-06-26 | End: 2024-06-26 | Stop reason: HOSPADM

## 2024-06-26 RX ORDER — DEXAMETHASONE SODIUM PHOSPHATE 4 MG/ML
4 INJECTION, SOLUTION INTRA-ARTICULAR; INTRALESIONAL; INTRAMUSCULAR; INTRAVENOUS; SOFT TISSUE
Status: COMPLETED | OUTPATIENT
Start: 2024-06-26 | End: 2024-06-26

## 2024-06-26 RX ORDER — BUPIVACAINE HYDROCHLORIDE 2.5 MG/ML
INJECTION, SOLUTION EPIDURAL; INFILTRATION; INTRACAUDAL
Status: DISCONTINUED
Start: 2024-06-26 | End: 2024-06-26 | Stop reason: HOSPADM

## 2024-06-26 RX ORDER — FENTANYL CITRATE 0.05 MG/ML
50 INJECTION, SOLUTION INTRAMUSCULAR; INTRAVENOUS
Status: COMPLETED | OUTPATIENT
Start: 2024-06-26 | End: 2024-06-26

## 2024-06-26 RX ORDER — HYDROMORPHONE HCL IN WATER/PF 6 MG/30 ML
0.4 PATIENT CONTROLLED ANALGESIA SYRINGE INTRAVENOUS EVERY 5 MIN PRN
Status: DISCONTINUED | OUTPATIENT
Start: 2024-06-26 | End: 2024-06-26 | Stop reason: HOSPADM

## 2024-06-26 RX ORDER — LIDOCAINE 40 MG/G
CREAM TOPICAL
Status: DISCONTINUED | OUTPATIENT
Start: 2024-06-26 | End: 2024-06-26 | Stop reason: HOSPADM

## 2024-06-26 RX ORDER — CEFAZOLIN SODIUM/WATER 2 G/20 ML
2 SYRINGE (ML) INTRAVENOUS SEE ADMIN INSTRUCTIONS
Status: DISCONTINUED | OUTPATIENT
Start: 2024-06-26 | End: 2024-06-26 | Stop reason: HOSPADM

## 2024-06-26 RX ORDER — ONDANSETRON 2 MG/ML
INJECTION INTRAMUSCULAR; INTRAVENOUS PRN
Status: DISCONTINUED | OUTPATIENT
Start: 2024-06-26 | End: 2024-06-26

## 2024-06-26 RX ORDER — PROPOFOL 10 MG/ML
INJECTION, EMULSION INTRAVENOUS PRN
Status: DISCONTINUED | OUTPATIENT
Start: 2024-06-26 | End: 2024-06-26

## 2024-06-26 RX ORDER — SCOLOPAMINE TRANSDERMAL SYSTEM 1 MG/1
1 PATCH, EXTENDED RELEASE TRANSDERMAL ONCE
Status: DISCONTINUED | OUTPATIENT
Start: 2024-06-26 | End: 2024-06-26 | Stop reason: HOSPADM

## 2024-06-26 RX ORDER — CEFAZOLIN SODIUM/WATER 2 G/20 ML
2 SYRINGE (ML) INTRAVENOUS
Status: DISCONTINUED | OUTPATIENT
Start: 2024-06-26 | End: 2024-06-26 | Stop reason: HOSPADM

## 2024-06-26 RX ORDER — HYDROXYZINE HYDROCHLORIDE 25 MG/1
TABLET, FILM COATED ORAL
Qty: 15 TABLET | Refills: 0 | Status: SHIPPED | OUTPATIENT
Start: 2024-06-26

## 2024-06-26 RX ORDER — KETOROLAC TROMETHAMINE 30 MG/ML
INJECTION, SOLUTION INTRAMUSCULAR; INTRAVENOUS PRN
Status: DISCONTINUED | OUTPATIENT
Start: 2024-06-26 | End: 2024-06-26

## 2024-06-26 RX ORDER — HYDROCODONE BITARTRATE AND ACETAMINOPHEN 5; 325 MG/1; MG/1
1-2 TABLET ORAL EVERY 6 HOURS PRN
Qty: 15 TABLET | Refills: 0 | Status: SHIPPED | OUTPATIENT
Start: 2024-06-26

## 2024-06-26 RX ORDER — FENTANYL CITRATE 0.05 MG/ML
25 INJECTION, SOLUTION INTRAMUSCULAR; INTRAVENOUS EVERY 5 MIN PRN
Status: DISCONTINUED | OUTPATIENT
Start: 2024-06-26 | End: 2024-06-26 | Stop reason: HOSPADM

## 2024-06-26 RX ORDER — IBUPROFEN 600 MG/1
TABLET, FILM COATED ORAL
Qty: 28 TABLET | Refills: 0 | Status: SHIPPED | OUTPATIENT
Start: 2024-06-26

## 2024-06-26 RX ADMIN — SODIUM CHLORIDE, POTASSIUM CHLORIDE, SODIUM LACTATE AND CALCIUM CHLORIDE: 600; 310; 30; 20 INJECTION, SOLUTION INTRAVENOUS at 08:29

## 2024-06-26 RX ADMIN — KETOROLAC TROMETHAMINE 30 MG: 30 INJECTION, SOLUTION INTRAMUSCULAR at 08:59

## 2024-06-26 RX ADMIN — ONDANSETRON 4 MG: 2 INJECTION INTRAMUSCULAR; INTRAVENOUS at 08:54

## 2024-06-26 RX ADMIN — BUPIVACAINE HYDROCHLORIDE 20 ML: 5 INJECTION, SOLUTION EPIDURAL; INTRACAUDAL at 08:01

## 2024-06-26 RX ADMIN — PROPOFOL 200 MG: 10 INJECTION, EMULSION INTRAVENOUS at 08:29

## 2024-06-26 RX ADMIN — FENTANYL CITRATE 50 MCG: 50 INJECTION, SOLUTION INTRAMUSCULAR; INTRAVENOUS at 08:08

## 2024-06-26 RX ADMIN — SCOLOPAMINE TRANSDERMAL SYSTEM 1 PATCH: 1 PATCH, EXTENDED RELEASE TRANSDERMAL at 07:17

## 2024-06-26 RX ADMIN — SODIUM CHLORIDE, POTASSIUM CHLORIDE, SODIUM LACTATE AND CALCIUM CHLORIDE: 600; 310; 30; 20 INJECTION, SOLUTION INTRAVENOUS at 08:59

## 2024-06-26 RX ADMIN — MIDAZOLAM 2 MG: 1 INJECTION INTRAMUSCULAR; INTRAVENOUS at 08:07

## 2024-06-26 RX ADMIN — PROPOFOL 150 MCG/KG/MIN: 10 INJECTION, EMULSION INTRAVENOUS at 08:29

## 2024-06-26 RX ADMIN — DEXAMETHASONE SODIUM PHOSPHATE 4 MG: 4 INJECTION, SOLUTION INTRA-ARTICULAR; INTRALESIONAL; INTRAMUSCULAR; INTRAVENOUS; SOFT TISSUE at 08:29

## 2024-06-26 RX ADMIN — LIDOCAINE HYDROCHLORIDE 80 MG: 20 INJECTION, SOLUTION INFILTRATION; PERINEURAL at 08:29

## 2024-06-26 RX ADMIN — Medication 2 G: at 08:26

## 2024-06-26 ASSESSMENT — ACTIVITIES OF DAILY LIVING (ADL)
ADLS_ACUITY_SCORE: 35
ADLS_ACUITY_SCORE: 33
ADLS_ACUITY_SCORE: 35

## 2024-06-26 NOTE — ANESTHESIA PROCEDURE NOTES
Saphenous Procedure Note    Pre-Procedure   Staff -        Anesthesiologist:  Roberto Murcia DO       Performed By: anesthesiologist       Location: pre-op       Pre-Anesthestic Checklist: patient identified, IV checked, site marked, risks and benefits discussed, informed consent, monitors and equipment checked, pre-op evaluation, at physician/surgeon's request and post-op pain management  Timeout:       Correct Patient: Yes        Correct Procedure: Yes        Correct Site: Yes        Correct Position: Yes        Correct Laterality: Yes        Site Marked: Yes  Procedure Documentation  Procedure: Saphenous       Laterality: left       Patient Position: supine       Patient Prep/Sterile Barriers: sterile gloves, mask, patient draped       Skin prep: Chloraprep       Local skin infiltrated with 3 mL of 1% lidocaine.        Needle Type: insulated and short bevel       Needle Gauge: 21.        Needle Length (Inches): 4        Ultrasound guided       1. Ultrasound was used to identify targeted nerve, plexus, vascular marker, or fascial plane and place a needle adjacent to it in real-time.       2. Ultrasound was used to visualize the spread of anesthetic in close proximity to the above referenced structure.       3. A permanent image is entered into the patient's record.    Assessment/Narrative         The placement was negative for: blood aspirated, painful injection and site bleeding       Paresthesias: No.       Test dose of mL at.         Test dose negative, 3 minutes after injection, for signs of intravascular, subdural, or intrathecal injection.       Bolus given via needle..        Secured via.        Insertion/Infusion Method: Single Shot       Complications: none    Medication(s) Administered   Bupivacaine 0.5% w/ 1:400K Epi (Injection) - Injection   10 mL - 6/26/2024 8:01:00 AM   Comments:  Ultrasound Interpretation, peripheral nerve block    1.  Under ultrasound guidance, the needle was inserted  "and placed in close proximity to the target nerve(s).  2. Ultrasound was also used to visualize the spread of the anesthetic in close proximity to the nerve(s) being blocked.  10 ml of 0.5% Bupivacaine w/ 1:400K Epi, in total, was administered in incremental doses, with intermittent negative aspiration.     3. The nerve(s) appeared anatomically normal.  4. There were no apparent abnormal pathological findings.  5. A permanent ultrasound image was saved in the patient's record.    Pt tolerated the procedure well.     The surgeon has given a verbal order transferring care of this patient to me for the performance of a regional analgesia block for post-op pain control. It is requested of me because I am uniquely trained and qualified to perform this block and the surgeon is neither trained nor qualified to perform this procedure.      FOR [facility] ([facility]) ONLY:   Pain Team Contact information: please page the Pain Team Via [employer]. Search \"Pain\". During daytime hours, please page the attending first. At night please page the resident first.      "

## 2024-06-26 NOTE — PROGRESS NOTES
S: Marlee is a 38 yof that was seen today at the [facility] for a Left Tall Walking Boot.    Dx: Kidner Procedure    O: The patient wears normally wears a size 9.5 shoe. The patient was fit with a medium Breg Tall Waking Boot.    A: I demonstrated the donning and doffing protocol for the Walking Boot. Questions were invited and answered. I then provided the manufactures instructions to the patient.    P: The patient should follow the physician s recommendation for use. Duration wear will also be at the direction of the patient s physician.    Here are two important NOTES:    NOTE 1: When applying the boot, tighten the bootstraps starting at the ankle, then the foot, then the leg. Also, remember after tightening the boot s straps, to inflate the pneumatic boot. The number of pumps will vary depending upon the tightness of the straps.    NOTE 2: Please release the air (turn the release valve to the left) from the boot before removing the bootstraps and the boot. This will reset the air bladder for its next use and preserve the integrity of the air bladder.    G: The goal of the walking boot is to immobilize the patient s involved ankle and foot bones to allow the patient to bear weight more comfortably. The pneumatic feature aids in immobilization of the foot and ankle within the boot by filling voids between the brace and the patient s anatomy.    Please call me at the [facility] Department ([phone]) if you have any questions or concerns.    Electronically signed by Kyle Sung, Board Eligible

## 2024-06-26 NOTE — ANESTHESIA PROCEDURE NOTES
Sciatic Procedure Note    Pre-Procedure   Staff -        Anesthesiologist:  Roberto Murcia DO       Performed By: anesthesiologist       Location: pre-op       Pre-Anesthestic Checklist: patient identified, IV checked, site marked, risks and benefits discussed, informed consent, monitors and equipment checked, pre-op evaluation, at physician/surgeon's request and post-op pain management  Timeout:       Correct Patient: Yes        Correct Procedure: Yes        Correct Site: Yes        Correct Position: Yes        Correct Laterality: Yes        Site Marked: Yes  Procedure Documentation  Procedure: Sciatic       Laterality: left       Patient Position: supine       Patient Prep/Sterile Barriers: sterile gloves, mask, patient draped       Skin prep: Chloraprep       Local skin infiltrated with 3 mL of 1% lidocaine.  (popliteal approach).       Needle Type: insulated and short bevel       Needle Gauge: 21.        Needle Length (Inches): 4        Ultrasound guided       1. Ultrasound was used to identify targeted nerve, plexus, vascular marker, or fascial plane and place a needle adjacent to it in real-time.       2. Ultrasound was used to visualize the spread of anesthetic in close proximity to the above referenced structure.       3. A permanent image is entered into the patient's record.    Assessment/Narrative         The placement was negative for: blood aspirated, painful injection and site bleeding       Paresthesias: No.       Test dose of mL at.         Test dose negative, 3 minutes after injection, for signs of intravascular, subdural, or intrathecal injection.       Bolus given via needle..        Secured via.        Insertion/Infusion Method: Single Shot       Complications: none    Medication(s) Administered   Bupivacaine 0.5% w/ 1:400K Epi (Injection) - Injection   20 mL - 6/26/2024 8:01:00 AM   Comments:  Ultrasound Interpretation, peripheral nerve block    1.  Under ultrasound guidance, the  "needle was inserted and placed in close proximity to the target nerve(s).  2. Ultrasound was also used to visualize the spread of the anesthetic in close proximity to the nerve(s) being blocked.  20 ml of 0.5% Bupivacaine w/ 1:400K Epi, in total, was administered in incremental doses, with intermittent negative aspiration.     3. The nerve(s) appeared anatomically normal.  4. There were no apparent abnormal pathological findings.  5. A permanent ultrasound image was saved in the patient's record.    Pt tolerated the procedure well.     The surgeon has given a verbal order transferring care of this patient to me for the performance of a regional analgesia block for post-op pain control. It is requested of me because I am uniquely trained and qualified to perform this block and the surgeon is neither trained nor qualified to perform this procedure.      FOR [facility] ([facility]) ONLY:   Pain Team Contact information: please page the Pain Team Via [employer]. Search \"Pain\". During daytime hours, please page the attending first. At night please page the resident first.      "

## 2024-06-26 NOTE — DISCHARGE INSTRUCTIONS
** Today you received Toradol, an antiinflammatory medication similar to Ibuprofen.  You should not take other antiinflammatory medication, such as Ibuprofen, Motrin, Advil, Aleve, Naprosyn, etc until 3:00 pm today. **        Same Day Surgery Discharge Instructions for  Sedation and General Anesthesia     It's not unusual to feel dizzy, light-headed or faint for up to 24 hours after surgery or while taking pain medication.  If you have these symptoms: sit for a few minutes before standing and have someone assist you when you get up to walk or use the bathroom.    You should rest and relax for the next 24 hours. We recommend you make arrangements to have an adult stay with you for at least 24 hours after your discharge.  Avoid hazardous and strenuous activity.    DO NOT DRIVE any vehicle or operate mechanical equipment for 24 hours following the end of your surgery.  Even though you may feel normal, your reactions may be affected by the medication you have received.    Do not drink alcoholic beverages for 24 hours following surgery.     Slowly progress to your regular diet as you feel able. It's not unusual to feel nauseated and/or vomit after receiving anesthesia.  If you develop these symptoms, drink clear liquids (apple juice, ginger ale, broth, 7-up, etc. ) until you feel better.  If your nausea and vomiting persists for 24 hours, please notify your surgeon.      All narcotic pain medications, along with inactivity and anesthesia, can cause constipation. Drinking plenty of liquids and increasing fiber intake will help.    For any questions of a medical nature, call your surgeon.    Do not make important decisions for 24 hours.    If you had general anesthesia, you may have a sore throat for a couple of days related to the breathing tube used during surgery.  You may use Cepacol lozenges to help with this discomfort.  If it worsens or if you develop a fever, contact your surgeon.     If you feel your pain is not  "well managed with the pain medications prescribed by your surgeon, please contact your surgeon's office to let them know so they can address your concerns.         [facility]      DISCHARGE INSTRUCTIONS FOLLOWING   REGIONAL BLOCK ANESTHESIA    Numbness or lack of feeling in the arm/leg that was operated may last up to 24 hours.  The average time is usually 10-15 hours.  You may not be able to lift or move the arm or leg where the operation was by itself during that time.  Long-acting local anesthetic medicines were used to give you long-lasting pain relief.  Wear a sling until your arm is completely \"awake\"  Avoid bumping your arm, leg or foot while it is numb  Avoid extremes of hot or cold while it is numb  Remain quiet and restful the day of surgery.  Resume normal activities gradually over the next day or so as advise by your surgeon.  Do not drive or operate  Any machinery until your extremity is full  \"awake\"        You will have a tingling and prickly sensation in your arm/leg as the feeling begins to return; you can also expect some discomfort. The amount of discomfort is unpredictable, but if you have more pain that can be controlled with the pain medication you received, you should contact your surgeon.  Start to take your pain pills as soon as you start to feel any discomfort or pain.                Crutch Instructions    Because of your surgery you will need crutches.  Make sure you tell your healthcare provider if crutches do not seem to work for you.  Using Crutches   Crutches are the most commonly used device for injuries to the lower part of the body because they allow the most mobility. All walking assistance devices require strength in your upper body but crutches require more coordination. If you are unable to use crutches then a cane or walker may be better.   Remember these rules when using crutches:   Always look straight ahead when you walk. Do not look down.   Hold the top padded " "part of the crutches into your chest near your armpits. Grasp the padded hand  and always support your weight with your arms and hands.   Do not lean on the crutches with your armpit as this could cause nerve damage.  Standing Up  Make sure you are in a stable chair. Move forward to the edge of the chair so that your  good  foot is flat on the floor. Put both crutches together and hold the handgrips in one hand. Stretch the injured leg out straight and then use the crutches with one hand and the chair armrest with the other hand to push yourself into a standing position onto your  good  leg. Once you are standing, wait until you are steady before placing a crutch in each hand. Until you become good at standing, have someone assist you in case you lose your balance. When standing still, lean slightly forward with the crutches ahead of you and about 3 feet apart. Unless you are told otherwise, you should keep weight off your injured leg.  Walking  To begin crutch walking, balance yourself on your  good  leg. Move both crutches forward about the length of one step and place them firmly on the floor in front of you about 3 feet apart. Support your weight on the padded hand rests while leaning forward. Press the top of the crutches against your chest wall and not into your armpits. Be sure to hold the injured leg up off of the floor. When ready, swing the \"good\" leg forward about one step length past the crutches while supporting your weight on the padded hand . Be careful not to go too far. Transfer your weight onto the \"good\" leg then bring both crutches forward about the length of one step. Repeating this motion will allow you to move fairly quickly without the use of your injured leg. Keep practicing until you become good at crutch walking.  Sitting Down  Make sure the chair you are about to sit on is stable. Walk in front of the chair such that you are facing away from it. Move back a little at a time " "until the back of your  good  leg is nearly touching the seat. Keeping your weight on the  good  leg, move both crutches to one hand holding onto the handgrips. Lean forward, bend your  good  knee, and move your injured leg out forward. Sit down slowly while using your free hand to reach for the chair s armrest for support. Place the crutches where you can easily get them. Never pivot, even on your  good  leg. This could cause you to fall or injure your  good  leg.  Navigating Stairs, Curbs & Door Steps  Only attempt this once you are very comfortable with regular crutch walking and only when someone is there to steady you should you begin to lose your balance. Hold on to a  railing with one hand and both crutches in the other hand or have someone carry the loose crutch for you. It does not matter which side the handrail is on. If there is no handrail, you can use both crutches. Using only crutches is the same as the railing method but maintaining your balance can be difficult. The crutch-only method is not recommended until you have become very good at crutch walking. Be sure to only take one step at a time. A simple rule to remember for crutches when navigating stairs or curbs: up with the  good  leg and down with the  bad .  Going Up Stairs or Curbs  Walk close to the first step with the crutches slightly behind you. Push down on the handrail and the crutch and step up with the \"good\" leg. You may have to make a short hop to do this if you are not allowed to place any weight on the injured leg. Lean forward and bring the injured leg and the crutch up beside the \"good\" leg. Repeat this until you have climbed up all of the steps. Remember, the \"good\" leg goes up first and the crutches move with the injured leg. The person helping you should stand behind and to your side that is away from the railing.  Going Down Stairs or Curbs  Walk to the edge of the first step. While standing and balancing on the " " good  leg, place both crutches in one hand and then down on the step below. Be sure to support your weight by leaning on the crutches and handrail. Bring the injured leg forward, then the \"good\" leg down to the same step as the crutches. Remember crutches go down first with the injured leg. The person helping you should  front and to your side that is away from the railing.  Sitting Method for Navigating Stairs  A safer way to get up and down stairs is to sit down. To go up steps, sit down on the second or third step. Push with your  good  leg below while pushing up with both arms on the step above to move your bottom to the next step. When you get to the top of the stairs you may need to use the  scooting  method described later to get back up. To go down steps you first need to lower yourself to the floor near the top of the steps. Once seated, support yourself with your  good  leg on the second to next step below, and push with both arms on the step where you are sitting to move your bottom down to the next step. When you reach the bottom, pull yourself up to standing position using your crutches. It is helpful if someone can move your crutches and assist you in getting up and down. With practice it may be possible to manage on your own.  If You Start To Fall  If you start to fall and cannot get your balance, throw the crutches away from you. Try to fall onto your  good  side away from your injury and then break your fall with your arms. If uninjured, you can usually get back up by moving into a sitting position and scooting to a chair. Push with your arms and hands on the chair seat while pushing with the \"good\" leg to get up into the chair. You should not attempt to get back up if you are having significant pain. Call for assistance or dial 911 for an ambulance if necessary.  Safety Tips for Crutch Walking   At first you may want to wear a training belt (or a strong regular belt) so others can assist " you.   Do not use crutches if you feel dizzy or drowsy.   Be careful on slick or wet surfaces like a kitchen or bathroom floor, snow, ice, or rainy conditions.   Temporarily remove pets, throw rugs or other items from your home that might trip you.   Do not hop around while holding onto furniture.   Wear sneakers or rubber soled shoes that will not easily slip or come off.   Be careful on ramps or slopes as they can be harder to walk up or down   Do not remove any parts from your crutches especially the padding or rubber traction footings. Replace padding or footings that become damaged immediately.   It is important to use your crutches correctly. If you feel any numbness or tingling in your arms, you are probably using the crutches incorrectly.  Helpful Hints   A bedside toilet or toilet riser may be helpful.   Always allow for extra time to get around. Children in school should be given permission to leave class early to avoid crowds when changing classes.   Elevate the injured leg when sitting.   Use a backpack to carry books or waist pouch to carry other items so that both hands are free.   Call your healthcare provider if you have any questions or concerns.         ** If you have questions or concerns about your procedure,   call Dr. Bazan at [phone] **

## 2024-06-26 NOTE — ANESTHESIA POSTPROCEDURE EVALUATION
Patient: Marlee Lloyd    Procedure: Procedure(s):  LEFT KIDNER PROCEDURE       Anesthesia Type:  General    Note:     Postop Pain Control: Uneventful            Sign Out: Well controlled pain   PONV: No   Neuro/Psych: Uneventful            Sign Out: Acceptable/Baseline neuro status   Airway/Respiratory: Uneventful            Sign Out: Acceptable/Baseline resp. status   CV/Hemodynamics: Uneventful            Sign Out: Acceptable CV status; No obvious hypovolemia; No obvious fluid overload   Other NRE: NONE   DID A NON-ROUTINE EVENT OCCUR?        Last vitals:  Vitals Value Taken Time   /79 06/26/24 1045   Temp 36.3  C (97.4  F) 06/26/24 1030   Pulse 56 06/26/24 1054   Resp 11 06/26/24 1054   SpO2 100 % 06/26/24 1054   Vitals shown include unfiled device data.    Electronically Signed By: Roberto Murcia DO, DO  June 26, 2024  2:46 PM

## 2024-06-26 NOTE — ANESTHESIA PROCEDURE NOTES
Airway       Patient location during procedure: OR  Staff -        Anesthesiologist:  Roberto Murcia DO       CRNA: Rina Salgado APRN CRNA       Other Anesthesia Staff: Nomei Patricio       Performed By: BON and with CRNAs       Procedure performed by resident/fellow/CRNA in presence of a teaching physician.    Consent for Airway        Urgency: elective  Indications and Patient Condition       Indications for airway management: julito-procedural       Induction type:intravenous       Mask difficulty assessment: 0 - not attempted    Final Airway Details       Final airway type: supraglottic airway    Supraglottic Airway Details        Type: LMA       Brand: I-Gel       LMA size: 4    Post intubation assessment        Placement verified by: capnometry, equal breath sounds and chest rise        Number of attempts at approach: 1       Number of other approaches attempted: 0       Secured with: tape       Ease of procedure: easy       Dentition: Intact and Unchanged

## 2024-06-26 NOTE — OP NOTE
Foot & Ankle Surgery  June 26, 2024    Surgeon:   Gold Bazan DPM FACFAS FACFAOM    Assistant: MAYO Velasquez DPM PGY-2    Pre-op diagnosis:  Painful os tibial externum left lower extremity with prominent navicular tuberosity    Post-op diagnosis:  Same    Procedure:  Kidner procedure with resection of ostial externum, debridement of PT tendon and remodeling of the navicular tuberosity    Pathology:  None    Anesthesia: General endotracheal with popliteal and saphenous nerve blocks    Hemostasis: Well-padded pneumatic thigh tourniquet    EBL: Less than 5 cc    Materials:    Arthrex fiber tack anchor x 1    Injectables: Preoperative popliteal and saphenous nerve blocks by anesthesia    Complications: None apparent    Intra-operative findings: Prominent navicular tuberosity.  Significant degenerative changes between os tibial externum and navicular tuberosity    Indications for procedure: Symptomatic os tibial externum without adequate resolution of symptoms with conservative management    After obtaining verbal consent, the patient received preoperative popliteal and saphenous nerve blocks by anesthesia.  She was transferred to the operating room and placed in the supine position on the well-padded operating table.  She was placed under general anesthesia.  The left lower extremity was then prepped and draped in normal aseptic fashion, exsanguinated, well-padded pneumatic thigh tourniquet was inflated.  Patient, procedure and site were correct identified by or staff.    Procedure 1: Left Kidner procedure with debridement of PT tendon and navicular tuberosity remodeling -attention was directed to the medial left arch where a 6 cm linear incision was carried down to subcutaneous tissue just superior to the PT tendon course.  Bleeding vessels were electrocauterized as necessary.  Blunt dissection was used to carry the incision down to the deep fascia.  Just superior to the PT tendon course, the incision was carried  down to bone and subperiosteal dissection was performed.  The accessory bone was identified and freed from surrounding soft tissue including deep fascia and PT tendon fibers.  There were significant degenerative changes at the articular surface between the accessory bone and the navicular.  There is mild inflammatory tissue around the PT tendon which was debrided with a rongeur.  The tendon itself otherwise appeared unremarkable.  The navicular tuberosity was remodeled with a sagittal saw down to bleeding cancellous bone.  The talonavicular joint was identified and the guidepin for the anchor was placed mid substance in the navicular oriented away from the talonavicular joint..  Using proprietary technique, the fiber tack anchor was placed.  With the heel and ankle in neutral alignment, the fiber tape from the anchor was placed through the distal PT tendon fiber and tied, advancing the tendon into the new insertion point.  The fiber tape was then placed through the superior soft tissue and again tied with the heel and ankle in neutral position.    The wound was flushed with copious amounts normal saline.  Layered closure was performed with 3-0 Vicryl, 4-0 Vicryl and 4-0 nylon.  A dry sterile dressing was applied and the patient was transferred to the PACU with vital signs stable and vascular status intact.  They appeared to tolerate the procedure well without complications.    Gold Bazan DPM FACFAS FACFAOM  Podiatric Foot & Ankle Surgeon  [facility]  [phone]

## 2024-06-26 NOTE — ANESTHESIA CARE TRANSFER NOTE
Patient: Marlee Lloyd    Procedure: Procedure(s):  LEFT KIDNER PROCEDURE       Diagnosis: Posterior tibial tendon dysfunction (PTTD) of left lower extremity [M76.822]  Diagnosis Additional Information: No value filed.    Anesthesia Type:   General     Note:    Oropharynx: oropharynx clear of all foreign objects  Level of Consciousness: awake and drowsy  Oxygen Supplementation: face mask  Level of Supplemental Oxygen (L/min / FiO2): 6  Independent Airway: airway patency satisfactory and stable  Dentition: dentition unchanged  Vital Signs Stable: post-procedure vital signs reviewed and stable  Report to RN Given: handoff report given  Patient transferred to: PACU    Handoff Report: Identifed the Patient, Identified the Reponsible Provider, Reviewed the pertinent medical history, Discussed the surgical course, Reviewed Intra-OP anesthesia mangement and issues during anesthesia, Set expectations for post-procedure period and Allowed opportunity for questions and acknowledgement of understanding      Vitals:  Vitals Value Taken Time   /62    Temp     Pulse 59    Resp 10 06/26/24 0924   SpO2 98 % 06/26/24 0924   Vitals shown include unfiled device data.    Electronically Signed By: CIRO Ovalles CRNA  June 26, 2024  9:25 AM

## 2024-06-27 ENCOUNTER — MYC MEDICAL ADVICE (OUTPATIENT)
Dept: ORTHOPEDICS | Facility: CLINIC | Age: 39
End: 2024-06-27
Payer: COMMERCIAL

## 2024-06-27 ENCOUNTER — TELEPHONE (OUTPATIENT)
Dept: PODIATRY | Facility: CLINIC | Age: 39
End: 2024-06-27
Payer: COMMERCIAL

## 2024-06-27 NOTE — TELEPHONE ENCOUNTER
Began to call patient but realized they prefer to be contacted via [employer], so MyChart message sent instead with provider feedback.     Tata Garsia MSA, ATC  Certified Athletic Trainer

## 2024-06-27 NOTE — TELEPHONE ENCOUNTER
Upon chart review, patient acknowledge MyChart message. No further action needed.     Tata Garsia, ATC

## 2024-06-27 NOTE — TELEPHONE ENCOUNTER
Patient had left kidner procedure yesterday, 6/26/24. Please see message below regarding stomach upset with aspirin use and advise if there is an alternative.     Tata Garsia, ATC

## 2024-06-27 NOTE — TELEPHONE ENCOUNTER
Other: Told to take Asprin to prevent blood clots, but Asprin upsets her stomach. Wondering if there is something else she should take     Could we send this information to you in [employer] or would you prefer to receive a phone call?:   Patient would like to be contacted via [employer]

## 2024-07-02 ENCOUNTER — OFFICE VISIT (OUTPATIENT)
Dept: PODIATRY | Facility: CLINIC | Age: 39
End: 2024-07-02
Payer: COMMERCIAL

## 2024-07-02 VITALS — BODY MASS INDEX: 23.65 KG/M2 | WEIGHT: 151 LBS

## 2024-07-02 DIAGNOSIS — M76.822 POSTERIOR TIBIAL TENDINITIS OF LEFT LOWER EXTREMITY: Primary | ICD-10-CM

## 2024-07-02 DIAGNOSIS — Z98.890 S/P FOOT SURGERY: ICD-10-CM

## 2024-07-02 PROCEDURE — 99024 POSTOP FOLLOW-UP VISIT: CPT | Performed by: PODIATRIST

## 2024-07-02 NOTE — LETTER
7/2/2024      Marlee Lloyd  [address]  [place] [place] [address]      Dear Colleague,    Thank you for referring your patient, Marlee Lloyd, to the [facility]. Please see a copy of my visit note below.    Foot & Ankle Surgery  July 2, 2024    S:  Patient in today sp left Kidner procedure on 6/26/2024.  Pain levels elevated but improving.  She has made some modifications to her pain regimen because she was not tolerating his other medications very well.    Wt 68.5 kg (151 lb)   LMP 06/06/2024   BMI 23.65 kg/m        ROS - positive for CC.  Patient denies current nausea, vomiting, chills, fevers, belly pain, calf pain, chest pain or SOB.  Complete remainder of ROS is otherwise neg.    PE -sutures intact, skin margins are well coapted.  Mild bruising but within normal limits for the stage postop.  Skin shows no trophic, color or temperature changes otherwise.  No calf redness, swelling or pain noted otherwise.    A/P - 38 year old yo patient approx 1 week sp above procedure  -We reviewed the procedure and intraoperative findings  -A new bandage was applied to the patient's left lower extremity  -Continue all postoperative instructions without change; reviewed  -Follow-up in 1 week for reassessment, dressing change and likely suture removal.  -Phase off prescription pain medications to Tylenol as tolerated.  She did not request a pain medication refill    Follow up  -1 week or sooner with acute issues    Plan for [id number] -once healed sufficiently to resume job duties      Gold Bazan DPM FACFAS FACFAOM  Podiatric Foot & Ankle Surgeon  [facility]  [phone]    Disclaimer: This note consists of symbols derived from keyboarding, dictation and/or voice recognition software. As a result, there may be errors in the script that have gone undetected. Please consider this when interpreting information found in this chart.    Again, thank you for  allowing me to participate in the care of your patient.        Sincerely,        Gold Bazan DPM, ANNETTA

## 2024-07-02 NOTE — PROGRESS NOTES
Foot & Ankle Surgery  July 2, 2024    S:  Patient in today sp left Kidner procedure on 6/26/2024.  Pain levels elevated but improving.  She has made some modifications to her pain regimen because she was not tolerating his other medications very well.    Wt 68.5 kg (151 lb)   LMP 06/06/2024   BMI 23.65 kg/m        ROS - positive for CC.  Patient denies current nausea, vomiting, chills, fevers, belly pain, calf pain, chest pain or SOB.  Complete remainder of ROS is otherwise neg.    PE -sutures intact, skin margins are well coapted.  Mild bruising but within normal limits for the stage postop.  Skin shows no trophic, color or temperature changes otherwise.  No calf redness, swelling or pain noted otherwise.    A/P - 38 year old yo patient approx 1 week sp above procedure  -We reviewed the procedure and intraoperative findings  -A new bandage was applied to the patient's left lower extremity  -Continue all postoperative instructions without change; reviewed  -Follow-up in 1 week for reassessment, dressing change and likely suture removal.  -Phase off prescription pain medications to Tylenol as tolerated.  She did not request a pain medication refill    Follow up  -1 week or sooner with acute issues    Plan for [id number] -once healed sufficiently to resume job duties      Gold Bazan DPM FACFAS FACFAOM  Podiatric Foot & Ankle Surgeon  [facility]  [phone]    Disclaimer: This note consists of symbols derived from keyboarding, dictation and/or voice recognition software. As a result, there may be errors in the script that have gone undetected. Please consider this when interpreting information found in this chart.

## 2024-07-11 ENCOUNTER — OFFICE VISIT (OUTPATIENT)
Dept: PODIATRY | Facility: CLINIC | Age: 39
End: 2024-07-11
Payer: COMMERCIAL

## 2024-07-11 VITALS — WEIGHT: 151 LBS | SYSTOLIC BLOOD PRESSURE: 118 MMHG | BODY MASS INDEX: 23.65 KG/M2 | DIASTOLIC BLOOD PRESSURE: 65 MMHG

## 2024-07-11 DIAGNOSIS — M76.822 POSTERIOR TIBIAL TENDINITIS OF LEFT LOWER EXTREMITY: Primary | ICD-10-CM

## 2024-07-11 DIAGNOSIS — Z98.890 S/P FOOT SURGERY: ICD-10-CM

## 2024-07-11 PROCEDURE — 99024 POSTOP FOLLOW-UP VISIT: CPT | Performed by: PODIATRIST

## 2024-07-11 NOTE — LETTER
7/11/2024      Marlee Lloyd  [address]  [facility] [address]      Dear Colleague,    Thank you for referring your patient, Marlee Lloyd, to the [facility] PODIATRY. Please see a copy of my visit note below.    Foot & Ankle Surgery  July 11, 2024    S:  Patient in today sp Kidner procedure left lower extremity on 6/26/2024.  Pain levels suddenly increased over the last few days and she sent a [employer] message.  No injury or inciting event was noted.  She also indicates she can get some tingling in the toes with prolonged nonweightbearing, although reports that this has happened when nonweightbearing therapy was attempted prior to surgery.     /65   Wt 68.5 kg (151 lb)   LMP 06/06/2024   BMI 23.65 kg/m        ROS - positive for CC.  Patient denies current nausea, vomiting, chills, fevers, belly pain, calf pain, chest pain or SOB.  Complete remainder of ROS is otherwise neg.    PE -sutures were removed, minimal superficial gapping was seen.  Swelling is within normal limits for the stage postop.  I see no acute concerning findings on examination today.  There are no signs of infection.  Skin shows no trophic, color or temperature changes otherwise.  No calf redness, swelling or pain noted otherwise.    A/P - 38 year old yo patient approx 2 weeks sp above procedure  -Sutures were removed today, Steri-Strips were applied.  Remove in 1 week if still present.  -She is okay to wash the foot tomorrow but avoid soaking/submerging x 1 week  -She is cleared to start touchdown weightbearing in the boot with crutches  -RICE/Tylenol as needed based on pain; she has also utilize anti-inflammatories for pain control.  She did not tolerate the Norco, states it gave her nightmares.  She is comfortable with OTC meds for pain control currently  -She is okay to increase activity levels to tolerance  -Continue DVT exercises until back to regular shoes  -I signed a knee scooter order on  6/18/2024 as an extension per her previous order from Dr. Glez.  She received a call saying that he did not have the order.  I placed another knee scooter ordered today, indicating that it was an extension of the original order versus a new order    Follow up  -4 weeks or sooner with acute issues    Plan for [id number] -once healed sufficiently      Gold Bazan DPM [facility] FACFAOM  Podiatric Foot & Ankle Surgeon  [facility]  [phone]    Disclaimer: This note consists of symbols derived from keyboarding, dictation and/or voice recognition software. As a result, there may be errors in the script that have gone undetected. Please consider this when interpreting information found in this chart.      Again, thank you for allowing me to participate in the care of your patient.        Sincerely,        Gold Bazan DPM, DPDIANA

## 2024-07-11 NOTE — PROGRESS NOTES
Foot & Ankle Surgery  July 11, 2024    S:  Patient in today sp Kidner procedure left lower extremity on 6/26/2024.  Pain levels suddenly increased over the last few days and she sent a [employer] message.  No injury or inciting event was noted.  She also indicates she can get some tingling in the toes with prolonged nonweightbearing, although reports that this has happened when nonweightbearing therapy was attempted prior to surgery.     /65   Wt 68.5 kg (151 lb)   LMP 06/06/2024   BMI 23.65 kg/m        ROS - positive for CC.  Patient denies current nausea, vomiting, chills, fevers, belly pain, calf pain, chest pain or SOB.  Complete remainder of ROS is otherwise neg.    PE -sutures were removed, minimal superficial gapping was seen.  Swelling is within normal limits for the stage postop.  I see no acute concerning findings on examination today.  There are no signs of infection.  Skin shows no trophic, color or temperature changes otherwise.  No calf redness, swelling or pain noted otherwise.    A/P - 38 year old yo patient approx 2 weeks sp above procedure  -Sutures were removed today, Steri-Strips were applied.  Remove in 1 week if still present.  -She is okay to wash the foot tomorrow but avoid soaking/submerging x 1 week  -She is cleared to start touchdown weightbearing in the boot with crutches  -RICE/Tylenol as needed based on pain; she has also utilize anti-inflammatories for pain control.  She did not tolerate the Norco, states it gave her nightmares.  She is comfortable with OTC meds for pain control currently  -She is okay to increase activity levels to tolerance  -Continue DVT exercises until back to regular shoes  -I signed a knee scooter order on 6/18/2024 as an extension per her previous order from Dr. Glez.  She received a call saying that he did not have the order.  I placed another knee scooter ordered today, indicating that it was an extension of the original order versus a new  order    Follow up  -4 weeks or sooner with acute issues    Plan for [id number] -once healed sufficiently      Gold Bazan DPM FACFAS FACFAOM  Podiatric Foot & Ankle Surgeon  [facility]  [phone]    Disclaimer: This note consists of symbols derived from keyboarding, dictation and/or voice recognition software. As a result, there may be errors in the script that have gone undetected. Please consider this when interpreting information found in this chart.

## 2024-07-11 NOTE — PATIENT INSTRUCTIONS
Thank you for choosing [facility] Podiatry / Foot & Ankle Surgery!    DR. LADD'S CLINIC LOCATIONS:     [facility] (Friday) TRIAGE LINE: [phone] [address] [facility]  APPOINTMENTS: [phone]   [place] [address] RADIOLOGY: [phone]    PHYSICAL THERAPY: [phone]    SET UP SURGERY: [phone]   [place] (Mon-Tues AM-Thurs) BILLING QUESTIONS: [phone] [address] FAX: [phone]   [place] [address] [facility]: [phone]     You are seen today 2 weeks out from your left foot surgery.  The incision appears to be healing well.  Sutures were removed.  Steri-Strips were applied as there was some superficial gapping along the incision.  The Steri-Strips can get a little wet but avoid soaking them.  Remove them in 1 week if they have not already fallen off.  Postop instructions moving forward:    1.  Touchdown weightbearing in boot with crutches as tolerated;    2.  Rest, ice, elevate and utilize anti-inflammatories or Tylenol as needed based on symptoms;    3.  Continue blood clot prevention exercises including calf massaging and ankle/toe range of motion;    4.  You are okay to increase activity levels to tolerance;    5.  Use the dispensed compression sleeve for swelling and pain control;    6.  You are okay to wash the foot starting tomorrow but avoid soaking/submerging for at least 1 week.    Follow-up in 4 weeks for your next recheck.  If all appears to be healing well, we will start weightbearing in the boot and you will be referred to physical therapy to start rehab.  Call prior with any questions.

## 2024-07-18 ENCOUNTER — MYC MEDICAL ADVICE (OUTPATIENT)
Dept: PODIATRY | Facility: CLINIC | Age: 39
End: 2024-07-18
Payer: COMMERCIAL

## 2024-07-18 NOTE — PROGRESS NOTES
OP Physical Therapy Discharge Note      DISCHARGE  Reason for Discharge: Change in medical status (surgery).    Equipment Issued: none    Discharge Plan: per surgeon's guidelines    Referring Provider:  Cesar Medellin

## 2024-07-18 NOTE — TELEPHONE ENCOUNTER
Please see patient's update that her incision opened and advise on recommendations.     Marilee Belcher, ELICIA, LAT, ATC  Certified Athletic Trainer

## 2024-07-20 ENCOUNTER — NURSE TRIAGE (OUTPATIENT)
Dept: NURSING | Facility: CLINIC | Age: 39
End: 2024-07-20
Payer: COMMERCIAL

## 2024-07-20 ENCOUNTER — OFFICE VISIT (OUTPATIENT)
Dept: URGENT CARE | Facility: URGENT CARE | Age: 39
End: 2024-07-20
Payer: COMMERCIAL

## 2024-07-20 VITALS
TEMPERATURE: 97.7 F | RESPIRATION RATE: 16 BRPM | DIASTOLIC BLOOD PRESSURE: 88 MMHG | WEIGHT: 145 LBS | HEART RATE: 66 BPM | OXYGEN SATURATION: 99 % | SYSTOLIC BLOOD PRESSURE: 152 MMHG | BODY MASS INDEX: 22.71 KG/M2

## 2024-07-20 DIAGNOSIS — T14.8XXA WOUND INFECTION: Primary | ICD-10-CM

## 2024-07-20 DIAGNOSIS — L08.9 WOUND INFECTION: Primary | ICD-10-CM

## 2024-07-20 PROCEDURE — 99213 OFFICE O/P EST LOW 20 MIN: CPT | Performed by: FAMILY MEDICINE

## 2024-07-20 RX ORDER — CEPHALEXIN 500 MG/1
500 CAPSULE ORAL 4 TIMES DAILY
Qty: 40 CAPSULE | Refills: 0 | Status: SHIPPED | OUTPATIENT
Start: 2024-07-20 | End: 2024-07-20

## 2024-07-20 RX ORDER — CEPHALEXIN 500 MG/1
500 CAPSULE ORAL 4 TIMES DAILY
Qty: 40 CAPSULE | Refills: 0 | Status: SHIPPED | OUTPATIENT
Start: 2024-07-20

## 2024-07-20 ASSESSMENT — PAIN SCALES - GENERAL: PAINLEVEL: MILD PAIN (2)

## 2024-07-20 NOTE — PROGRESS NOTES
Assessment & Plan     Wound infection  Differentials discussed in detail including wound infection.  Cephalexin antibiotic prescribed.  Recommended to continue over-the-counter analgesia, leg elevation and to follow-up with podiatrist on Monday.  Instructed to go ER if symptoms worsen.  All questions answered.  - cephALEXin (KEFLEX) 500 MG capsule; Take 1 capsule (500 mg) by mouth 4 times daily for 10 days      Subjective   Marlee is a 38 year old, presenting for the following health issues:  Infection (Left foot surgery site)    HPI   Concern -   Onset: 2 days  Description: had left foot surgery on June 26th, surgical wound split open yesterday, as seen in ER, Steri-Strips were placed, noticing warmth and redness around wound today  Intensity: moderate  Progression of Symptoms:  worsening  Accompanying Signs & Symptoms: No fever, chills or other relevant systemic symptoms      Review of Systems  Constitutional, HEENT, cardiovascular, pulmonary, gi and gu systems are negative, except as otherwise noted.      Objective    BP (!) 152/88 (BP Location: Right arm, Patient Position: Sitting, Cuff Size: Adult Regular)   Pulse 66   Temp 97.7  F (36.5  C) (Oral)   Resp 16   Wt 65.8 kg (145 lb)   LMP 06/06/2024   SpO2 99%   BMI 22.71 kg/m    Body mass index is 22.71 kg/m .  Physical Exam   GENERAL: alert and no distress  EYES: Eyes grossly normal to inspection  RESP: no wheezes  MS: no gross musculoskeletal defects noted, no edema  SKIN: Left foot surgical scar with some blood stained scab, surrounding skin erythematous and bruised, Steri-Strips in situ, pedal pulses 3+  NEURO: Normal strength and tone, mentation intact and speech normal  PSYCH: mentation appears normal, affect normal/bright            Signed Electronically by: Royal Reddy MD

## 2024-07-20 NOTE — TELEPHONE ENCOUNTER
Foot surgery on June 26  Incisioin broke open while out of town  .Ic-sncnf-btljnyfo at an ER.  Messaged surgeon.  Surgeon responded.  Marlee understood that if the wound starts to look infected she is to be seen sooner than her follow-up in three weeks.    I triaged Marlee for post-op incision symptoms and questions.I recommended she be seen now in an UC.  Caller stated understanding and agreement.  She is going to message Dr Bazan and UC notes will be available for him to review also.        Reason for Disposition   Surgical wound infection suspected (post-op)   [1] Pus or bad-smelling fluid draining from incision AND [2] no fever    Additional Information   Negative: [1] Major abdominal surgical incision AND [2] wound gaping open AND [3] visible internal organs   Negative: Sounds like a life-threatening emergency to the triager   Negative: Patient has a Negative Pressure Wound Therapy device   Negative: Patient is followed by a wound clinic or wound specialist for this wound   Negative: [1] Bleeding from incision AND [2] won't stop after 10 minutes of direct pressure   Negative: [1] Bleeding (more than a few drops) from incision AND [2] tracheostomy or blood vessel surgery (e.g., carotid endarterectomy, femoral bypass graft, kidney dialysis fistula)   Negative: [1] Widespread rash AND [2] bright red, sunburn-like   Negative: Severe pain in the incision   Negative: [1] Incision gaping open AND [2] < 48 hours since wound re-opened   Negative: [1] Incision gaping open AND [2] length of opening > 2 inches (5 cm)   Negative: Patient sounds very sick or weak to the triager   Negative: Sounds like a serious complication to the triager   Negative: Fever > 100.4 F (38.0 C)   Negative: [1] Widespread rash AND [2] bright red, sunburn-like AND [3] too weak to stand   Negative: Sounds like a life-threatening emergency to the triager   Negative: [1] Wound infection diagnosed AND [2] taking an antibiotic   Negative: [1]  Cellulitis diagnosed AND [2] taking an antibiotic   Negative: Animal bite wound infection suspected   Negative: Boil suspected (painful red lump)   Negative: Impetigo suspected (e.g., infected sore; soft yellow crusts or scabs)   Negative: Surgical  wound infection suspected   Negative: [1] Incision looks infected (spreading redness, pain) AND [2] fever > 99.5 F (37.5 C)   Negative: [1] Incision looks infected (spreading redness, pain) AND [2] large red area (> 2 in. or 5 cm)   Negative: [1] Incision looks infected (spreading redness, pain) AND [2] face wound   Negative: [1] Red streak runs from the incision AND [2] longer than 1 inch (2.5 cm)    Protocols used: Wound Infection Dmbwawteh-E-XB, Post-Op Incision Symptoms and Zyawcckcq-F-LHTREVA JAIMES RN [employer]

## 2024-07-22 ENCOUNTER — OFFICE VISIT (OUTPATIENT)
Dept: PODIATRY | Facility: CLINIC | Age: 39
End: 2024-07-22
Payer: COMMERCIAL

## 2024-07-22 VITALS — DIASTOLIC BLOOD PRESSURE: 72 MMHG | BODY MASS INDEX: 22.71 KG/M2 | SYSTOLIC BLOOD PRESSURE: 106 MMHG | WEIGHT: 145 LBS

## 2024-07-22 DIAGNOSIS — Z98.890 S/P FOOT SURGERY: ICD-10-CM

## 2024-07-22 DIAGNOSIS — M76.822 POSTERIOR TIBIAL TENDINITIS OF LEFT LOWER EXTREMITY: Primary | ICD-10-CM

## 2024-07-22 PROCEDURE — 99024 POSTOP FOLLOW-UP VISIT: CPT | Performed by: PODIATRIST

## 2024-07-22 NOTE — LETTER
7/22/2024      Marlee Lloyd  [address]  [facility] [address]      Dear Colleague,    Thank you for referring your patient, Marlee Lloyd, to the [facility] PODIATRY. Please see a copy of my visit note below.    Foot & Ankle Surgery  July 22, 2024    S:  Patient in today sp left Kidner procedure on 6/26/2024.  Pain levels elevated but improving.  She was last seen on 7/11/2024 at which point sutures were removed and Steri-Strips were applied.  Since then, the wound has dehisced and she was seen in urgent care and the ER.  New Steri-Strips were applied and she was recently put on a course of Keflex.  She reached out via [employer] and I recommended she come into clinic for reassessment.  She states the discomfort and the surrounding redness are improving since starting the antibiotic.  She states the boot irritates the incision and so she is not wearing this regularly.    /72   Wt 65.8 kg (145 lb)   LMP 06/06/2024   BMI 22.71 kg/m        ROS - positive for CC.  Patient denies current nausea, vomiting, chills, fevers, belly pain, calf pain, chest pain or SOB.  Complete remainder of ROS is otherwise neg.    PE -the skin margins appear well coapted without visible open wounds noted.  There is minimal surrounding redness or signs of infection today.  Active range of motion of the ankle shows essentially no pain at the surgical site.  Skin shows no trophic, color or temperature changes otherwise.  No calf redness, swelling or pain noted otherwise.    A/P - 38 year old yo patient approx 3 and half weeks sp above procedure  -I did mention that we would probably remove the Steri-Strips and start doing wound care but the skin margins appear well coapted and so we will leave the Steri-Strips intact.  She was given new strips and skin adhesive by the ER  -Finished the oral antibiotics.  See no indication for any new or extended course  -While touchdown weightbearing the boot is  reasonable, I encouraged her to not put weight on the foot outside the boot.  Range of motion exercises may help to facilitate healing  -She was given a new Tensogrip compression sleeve to help with swelling control  -She will follow-up in 2 and half weeks, her 6-week postop appointment.  Hopefully the incision is fully healed at that point and we can remove the Steri-Strips if they have not fallen off prior.  Anticipate starting weightbearing in the boot and physical therapy for [facility] functional rehab    Follow up  -2 and half weeks or sooner with acute issues    Plan for akebnf-hp-cupp -once healed sufficiently      Gold Bazan DPM [facility] FACFAOM  Podiatric Foot & Ankle Surgeon  [place]  [phone]    Disclaimer: This note consists of symbols derived from keyboarding, dictation and/or voice recognition software. As a result, there may be errors in the script that have gone undetected. Please consider this when interpreting information found in this chart.      Again, thank you for allowing me to participate in the care of your patient.        Sincerely,        Gold Bazan DPM, ANNETTA

## 2024-07-22 NOTE — PROGRESS NOTES
Foot & Ankle Surgery  July 22, 2024    S:  Patient in today sp left Kidner procedure on 6/26/2024.  Pain levels elevated but improving.  She was last seen on 7/11/2024 at which point sutures were removed and Steri-Strips were applied.  Since then, the wound has dehisced and she was seen in urgent care and the ER.  New Steri-Strips were applied and she was recently put on a course of Keflex.  She reached out via "[employer]t and I recommended she come into clinic for reassessment.  She states the discomfort and the surrounding redness are improving since starting the antibiotic.  She states the boot irritates the incision and so she is not wearing this regularly.    /72   Wt 65.8 kg (145 lb)   LMP 06/06/2024   BMI 22.71 kg/m        ROS - positive for CC.  Patient denies current nausea, vomiting, chills, fevers, belly pain, calf pain, chest pain or SOB.  Complete remainder of ROS is otherwise neg.    PE -the skin margins appear well coapted without visible open wounds noted.  There is minimal surrounding redness or signs of infection today.  Active range of motion of the ankle shows essentially no pain at the surgical site.  Skin shows no trophic, color or temperature changes otherwise.  No calf redness, swelling or pain noted otherwise.    A/P - 38 year old yo patient approx 3 and half weeks sp above procedure  -I did mention that we would probably remove the Steri-Strips and start doing wound care but the skin margins appear well coapted and so we will leave the Steri-Strips intact.  She was given new strips and skin adhesive by the ER  -Finished the oral antibiotics.  See no indication for any new or extended course  -While touchdown weightbearing the boot is reasonable, I encouraged her to not put weight on the foot outside the boot.  Range of motion exercises may help to facilitate healing  -She was given a new Tensogrip compression sleeve to help with swelling control  -She will follow-up in 2 and half  weeks, her 6-week postop appointment.  Hopefully the incision is fully healed at that point and we can remove the Steri-Strips if they have not fallen off prior.  Anticipate starting weightbearing in the boot and physical therapy for [facility] functional rehab    Follow up  -2 and half weeks or sooner with acute issues    Plan for [id number]-js-[id number] -once healed sufficiently      Gold Bazan DPM [facility] FACFAOM  Podiatric Foot & Ankle Surgeon  [facility]  [phone]    Disclaimer: This note consists of symbols derived from keyboarding, dictation and/or voice recognition software. As a result, there may be errors in the script that have gone undetected. Please consider this when interpreting information found in this chart.

## 2024-07-22 NOTE — TELEPHONE ENCOUNTER
Please see updated photos in Mychart. Patient seen at Urgent Care on 7/20/24 and put on Cephalexin 500 mg four times daily. Please advise if patient can be worked in today or if we can use the only remaining acute slot on 7/23/24.     ATTILA Goldman RN

## 2024-07-22 NOTE — TELEPHONE ENCOUNTER
Per Dr. Bazan, ok for patient to leave now and be worked in around 10:45. Appointment scheduled and patient states it takes about 30 minutes to get here. She verbalized understanding.     ATTILA Goldman RN

## 2024-08-08 ENCOUNTER — OFFICE VISIT (OUTPATIENT)
Dept: PODIATRY | Facility: CLINIC | Age: 39
End: 2024-08-08
Payer: COMMERCIAL

## 2024-08-08 VITALS — WEIGHT: 145 LBS | BODY MASS INDEX: 22.71 KG/M2 | SYSTOLIC BLOOD PRESSURE: 133 MMHG | DIASTOLIC BLOOD PRESSURE: 85 MMHG

## 2024-08-08 DIAGNOSIS — Z98.890 S/P FOOT SURGERY: ICD-10-CM

## 2024-08-08 DIAGNOSIS — M76.822 POSTERIOR TIBIAL TENDINITIS OF LEFT LOWER EXTREMITY: Primary | ICD-10-CM

## 2024-08-08 PROCEDURE — 99024 POSTOP FOLLOW-UP VISIT: CPT | Performed by: PODIATRIST

## 2024-08-08 NOTE — PROGRESS NOTES
Foot & Ankle Surgery  August 8, 2024    S:  Patient in today sp left Kidner procedure on 6/26/2024.  Pain levels improving.  She had sent a [employer] message on 8-24 that she had fallen again on the foot, mostly in the top of the foot, and had some pain and swelling around the ankle bone.  She had done extensive icing and elevating.  That pain had improved, she then mentioned in the [employer] message that she is having pain at the bottom of the foot near the surgical site.  This seems to be improved.  She is mention some increasing numbness and tingling in the top of the foot including the second, third and fourth toes, as well as some sharp pain at the end of the great toe and the fourth toe.  She states that abnormal sensation seems to correlate with swelling levels where increased swelling leads to increased symptoms and decrease in swelling has less pronounced symptoms.  She has been doing Betadine application to the incision and this appears to be healing well    /85   Wt 65.8 kg (145 lb)   LMP 06/06/2024   BMI 22.71 kg/m        ROS - positive for CC.  Patient denies current nausea, vomiting, chills, fevers, belly pain, calf pain, chest pain or SOB.  Complete remainder of ROS is otherwise neg.    PE -the incision appears to be healing well.  There is a small central dry scab.  No open portions are seen and there are no signs of infection.  She has 4+/5 resisted inversion and ankle plantarflexion strength without pain.  Skin shows no trophic, color or temperature changes otherwise.  No calf redness, swelling or pain noted otherwise.    A/P - 38 year old yo patient approx 6 weeks sp above procedure  -I see no evidence on her examination that the surgical site has been disrupted.  She has good strength with minimal discomfort.  -The patient is cleared to start increasing weight on the foot of the boot and slowly phasing out the crutches to tolerance with care to avoid exceeding the pain threshold at the  surgical site with time/weight on the foot as this can have negative impact on healing  -RICE/NSAID versus Tylenol as needed based on pain  -She is given Tensogrip as the swelling seems to be contributing to some of her paresthesias.  -[facility] PT referral to start musculoskeletal functional rehab  -She will continue with the Betadine bandaging along the incision until this has fully healed at which point she is okay to stop.  I see no signs of infection to warrant antibiotics  -She will follow-up with one of my partners in 4 weeks for her 10-week postop appointment.  If she has full weightbearing in the boot without weightbearing assistive device and without pain, anticipate clearing her to return to regular shoes +/- Tri-Lock ankle brace    Follow up  -4 weeks or sooner with acute issues    Plan for [id number]-zr-glmv -once healed sufficiently      Gold Bazan DPM FACFAS FACFAOM  Podiatric Foot & Ankle Surgeon  [facility]  [phone]    Disclaimer: This note consists of symbols derived from keyboarding, dictation and/or voice recognition software. As a result, there may be errors in the script that have gone undetected. Please consider this when interpreting information found in this chart.

## 2024-08-08 NOTE — PATIENT INSTRUCTIONS
Thank you for choosing [facility] Podiatry / Foot & Ankle Surgery!    DR. LADD'S CLINIC LOCATIONS:     [facility] (Friday) TRIAGE LINE: [phone] [address] [facility]  APPOINTMENTS: [phone]   [place] [address] RADIOLOGY: [phone]    PHYSICAL THERAPY: [phone]    SET UP SURGERY: [phone]   [facility] (Mon-Tues AM-Thurs) BILLING QUESTIONS: [phone] [id number] FAX: [phone]   KAMRYN Patel [address] [facility]: [phone]     He was seen today 6 weeks out from the left Kidner procedure.  The incision appears to be healing well.  Continue with current wound care until that has fully healed.  Recommendations:    1.  You are cleared to start increasing weight on the foot in the boot and phasing off the crutches to tolerance with care to avoid exceeding the pain threshold at the surgical site with time/weight on the foot as this can have a negative impact on healing.  You are not yet clear to phase out of the boot.  The plan for that is at your follow-up appointment in 4 weeks if the clinical exam demonstrates adequate healing;    2.  Physical therapy referral.  Call the above number if you have not heard from them by Monday afternoon.  The goal for physical therapy is to get the surgical site and the left lower extremity back up to speed as quickly and uneventfully as possible;    3.  Rest, ice, elevate and utilize Tylenol as needed for pain and inflammation control.    You can schedule a follow-up appointment with one of my partners, Shazia Faye or Fabian, in 4 weeks for your 10-week postop appointment.  Call prior with any questions.

## 2024-08-08 NOTE — LETTER
8/8/2024      Marlee Lloyd  [address]  [place] [address]      Dear Colleague,    Thank you for referring your patient, Marlee Lloyd, to the [facility] PODIATRY. Please see a copy of my visit note below.    Foot & Ankle Surgery  August 8, 2024    S:  Patient in today sp left Kidner procedure on 6/26/2024.  Pain levels improving.  She had sent a Nu[employer] message on 8-24 that she had fallen again on the foot, mostly in the top of the foot, and had some pain and swelling around the ankle bone.  She had done extensive icing and elevating.  That pain had improved, she then mentioned in the [employer] message that she is having pain at the bottom of the foot near the surgical site.  This seems to be improved.  She is mention some increasing numbness and tingling in the top of the foot including the second, third and fourth toes, as well as some sharp pain at the end of the great toe and the fourth toe.  She states that abnormal sensation seems to correlate with swelling levels where increased swelling leads to increased symptoms and decrease in swelling has less pronounced symptoms.  She has been doing Betadine application to the incision and this appears to be healing well    /85   Wt 65.8 kg (145 lb)   LMP 06/06/2024   BMI 22.71 kg/m        ROS - positive for CC.  Patient denies current nausea, vomiting, chills, fevers, belly pain, calf pain, chest pain or SOB.  Complete remainder of ROS is otherwise neg.    PE -the incision appears to be healing well.  There is a small central dry scab.  No open portions are seen and there are no signs of infection.  She has 4+/5 resisted inversion and ankle plantarflexion strength without pain.  Skin shows no trophic, color or temperature changes otherwise.  No calf redness, swelling or pain noted otherwise.    A/P - 38 year old yo patient approx 6 weeks sp above procedure  -I see no evidence on her examination that the surgical site has  been disrupted.  She has good strength with minimal discomfort.  -The patient is cleared to start increasing weight on the foot of the boot and slowly phasing out the crutches to tolerance with care to avoid exceeding the pain threshold at the surgical site with time/weight on the foot as this can have negative impact on healing  -RICE/NSAID versus Tylenol as needed based on pain  -She is given Tensogrip as the swelling seems to be contributing to some of her paresthesias.  -[facility] PT referral to start musculoskeletal functional rehab  -She will continue with the Betadine bandaging along the incision until this has fully healed at which point she is okay to stop.  I see no signs of infection to warrant antibiotics  -She will follow-up with one of my partners in 4 weeks for her 10-week postop appointment.  If she has full weightbearing in the boot without weightbearing assistive device and without pain, anticipate clearing her to return to regular shoes +/- Tri-Lock ankle brace    Follow up  -4 weeks or sooner with acute issues    Plan for [id number] -once healed sufficiently      Gold Bazan DPM FACFAS FACFAOM  Podiatric Foot & Ankle Surgeon  [facility]  [phone]    Disclaimer: This note consists of symbols derived from keyboarding, dictation and/or voice recognition software. As a result, there may be errors in the script that have gone undetected. Please consider this when interpreting information found in this chart.      Again, thank you for allowing me to participate in the care of your patient.        Sincerely,        Gold Bazan DPM, ANNETTA

## 2024-08-10 ENCOUNTER — THERAPY VISIT (OUTPATIENT)
Dept: PHYSICAL THERAPY | Facility: CLINIC | Age: 39
End: 2024-08-10
Attending: PODIATRIST
Payer: COMMERCIAL

## 2024-08-10 DIAGNOSIS — M76.822 POSTERIOR TIBIAL TENDON DYSFUNCTION (PTTD) OF LEFT LOWER EXTREMITY: ICD-10-CM

## 2024-08-10 DIAGNOSIS — Z98.890 S/P FOOT SURGERY: ICD-10-CM

## 2024-08-10 DIAGNOSIS — M79.672 LEFT FOOT PAIN: Primary | ICD-10-CM

## 2024-08-10 DIAGNOSIS — M76.822 POSTERIOR TIBIAL TENDINITIS OF LEFT LOWER EXTREMITY: ICD-10-CM

## 2024-08-10 PROCEDURE — 97161 PT EVAL LOW COMPLEX 20 MIN: CPT | Mod: GP | Performed by: PHYSICAL THERAPIST

## 2024-08-10 PROCEDURE — 97110 THERAPEUTIC EXERCISES: CPT | Mod: GP | Performed by: PHYSICAL THERAPIST

## 2024-08-10 ASSESSMENT — ACTIVITIES OF DAILY LIVING (ADL)
RUNNING_ON_EVEN_GROUND: EXTREME DIFFICULTY OR UNABLE TO PERFORM ACTIVITY
STANDING_FOR_1_HOUR: QUITE A BIT OF DIFFICULTY
PERFORMING_HEAVY_ACTIVITIES_AROUND_YOUR_HOME: EXTREME DIFFICULTY OR UNABLE TO PERFORM ACTIVITY
SITTING_FOR_1_HOUR: MODERATE DIFFICULTY
WALKING_A_MILE: EXTREME DIFFICULTY OR UNABLE TO PERFORM ACTIVITY
LEFS_RAW_SCORE: 0
PLEASE_INDICATE_YOR_PRIMARY_REASON_FOR_REFERRAL_TO_THERAPY:: FOOT AND/OR ANKLE
GOING_UP_OR_DOWN_10_STAIRS: QUITE A BIT OF DIFFICULTY
WALKING_2_BLOCKS: EXTREME DIFFICULTY OR UNABLE TO PERFORM ACTIVITY
LIFTING_AN_OBJECT,_LIKE_A_BAG_OF_GROCERIES_FROM_THE_FLOOR: NO DIFFICULTY
GETTING_INTO_OR_OUT_OF_A_CAR: MODERATE DIFFICULTY
ROLLING_OVER_IN_BED: A LITTLE BIT OF DIFFICULTY
YOUR_USUAL_HOBBIES,_RECREATIONAL_OR_SPORTING_ACTIVITIES: EXTREME DIFFICULTY OR UNABLE TO PERFORM ACTIVITY
MAKING_SHARP_TURNS_WHILE_RUNNING_FAST: EXTREME DIFFICULTY OR UNABLE TO PERFORM ACTIVITY
RUNNING_ON_UNEVEN_GROUND: EXTREME DIFFICULTY OR UNABLE TO PERFORM ACTIVITY
WALKING_BETWEEN_ROOMS: EXTREME DIFFICULTY OR UNABLE TO PERFORM ACTIVITY
ANY_OF_YOUR_USUAL_WORK,_HOUSEWORK_OR_SCHOOL_ACTIVITIES: QUITE A BIT OF DIFFICULTY
SHOPPING: EXTREME DIFFICULTY OR UNABLE TO PERFORM ACTIVITY
PERFORMING_LIGHT_ACTIVITIES_AROUND_YOUR_HOME: QUITE A BIT OF DIFFICULTY
SQUATTING: A LITTLE BIT OF DIFFICULTY
PUTTING_ON_YOUR_SHOES_OR_SOCKS: NO DIFFICULTY
LEFS_SCORE(%): 0
GETTING_INTO_AND_OUT_OF_A_BATH: A LITTLE BIT OF DIFFICULTY

## 2024-08-10 NOTE — PROGRESS NOTES
PHYSICAL THERAPY EVALUATION  Type of Visit: Evaluation       Fall Risk Screen:  Fall screen completed by: PT  Have you fallen 2 or more times in the past year?: Yes (post surgical)  Have you fallen and had an injury in the past year?: Yes  Is patient a fall risk?: No  Fall screen comments: post surgical falls    Subjective       Presenting condition or subjective complaint: post surgery, have been non weight bearing for 4 months.  Did have a few falls and an infection in the incision  the incision when it opened.    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    Patient goals for therapy: walk    Pain assessment:      Objective   FOOT/ANKLE EVALUATION  PAIN: Pain Level at Rest: 1/10  Pain Level with Use: 8/10  Pain Location: foot   Pain Quality: Aching, Burning, Sharp, and Shooting  Pain Frequency: constant  Pain is Worst: daytime or nighttime  Pain is Exacerbated By: after walking;  prolong time on feet;  later in day  Pain is Relieved By: cold and elevation  Pain Progression: Unchanged  INTEGUMENTARY (edema, incisions):  mild swelling  POSTURE: WNL  GAIT:   Weightbearing Status: PWB  Assistive Device(s): CAM, Crutches (bilateral)  Gait Deviations: Antalgic  BALANCE/PROPRIOCEPTION:   WEIGHT BEARING ALIGNMENT:   NON-WEIGHTBEARING ALIGNMENT:    ROM:   (Degrees) Left AROM Left PROM  Right AROM Right PROM   Ankle Dorsiflexion 18  23    Ankle  Plantarflexion 34  55    Ankle Inversion       Ankle Eversion       Great Toe Flexion       Great Toe Extension       Pain:   End feel:     STRENGTH:  slight pain with resistance DF/ PF  FLEXIBILITY:   SPECIAL TESTS:   FUNCTIONAL TESTS:   PALPATION:   + Tenderness at Location Left Right   Gastroc/Soleus     Achilles Tendon     Anterior Tibialis     Posterior Tibialis +    Incisional +    Deltoid Ligament     Plantar Fascia     Navicular     Medial Calcaneal     Peroneals     Anterior Talofibular Ligament     Posterior Talofibular Ligament     Calcaneofibular Ligament     Medial Malleolus +    Lateral Malleolus     Anterior Tibiofibular Ligament     Posterior Tibiofibular Ligament       JOINT MOBILITY:     Assessment & Plan   CLINICAL IMPRESSIONS  Medical Diagnosis: L posterior tendonitis/ s/p surgery    Treatment Diagnosis: L ankle pain / posterior tib tendonitis   Impression/Assessment: Patient is a 38 year old female with L foot complaints.  The following significant findings have been identified: Pain, Decreased ROM/flexibility, Decreased joint mobility, Decreased strength, Impaired balance, Impaired gait, Impaired muscle performance, and Decreased activity tolerance. These impairments interfere with their ability to perform self care tasks, work tasks, recreational activities, household chores, driving , household mobility, and community mobility as compared to previous level of function.     Clinical Decision Making (Complexity):  Clinical Presentation: Stable/Uncomplicated  Clinical Presentation Rationale: based on medical and personal factors listed in PT evaluation  Clinical Decision Making (Complexity): Low complexity    PLAN OF CARE  Treatment Interventions:  Interventions: Gait Training, Manual Therapy, Neuromuscular Re-education, Therapeutic Activity, Therapeutic Exercise    Long Term Goals     PT Goal 1  Goal Identifier: L ankle/ foot  Goal Description: pt able to ambulate x 30 min with 1/10 pain  withtout AD  Rationale: to maximize safety and independence with performance of ADLs and functional tasks;to maximize safety and independence within the home;to maximize safety and independence within the community;to maximize safety and independence with transportation;to maximize safety and independence with self cares  Target Date: 10/05/24      Frequency of Treatment: 1x/ week  Duration of Treatment: 8 week    Recommended Referrals to Other Professionals:   Education Assessment:   Learner/Method: Patient;Demonstration;Pictures/Video    Risks and benefits of evaluation/treatment have been explained.   Patient/Family/caregiver agrees with Plan of Care.     Evaluation Time:     PT Eval, Low Complexity Minutes (99940): 15       Signing Clinician: Mateusz Smith PT

## 2024-08-23 ENCOUNTER — THERAPY VISIT (OUTPATIENT)
Dept: PHYSICAL THERAPY | Facility: CLINIC | Age: 39
End: 2024-08-23
Attending: PODIATRIST
Payer: COMMERCIAL

## 2024-08-23 DIAGNOSIS — M76.822 POSTERIOR TIBIAL TENDON DYSFUNCTION (PTTD) OF LEFT LOWER EXTREMITY: ICD-10-CM

## 2024-08-23 DIAGNOSIS — M79.672 LEFT FOOT PAIN: Primary | ICD-10-CM

## 2024-08-23 PROCEDURE — 97110 THERAPEUTIC EXERCISES: CPT | Mod: GP | Performed by: PHYSICAL THERAPIST

## 2024-08-23 PROCEDURE — 97140 MANUAL THERAPY 1/> REGIONS: CPT | Mod: GP | Performed by: PHYSICAL THERAPIST

## 2024-08-23 PROCEDURE — 97530 THERAPEUTIC ACTIVITIES: CPT | Mod: GP | Performed by: PHYSICAL THERAPIST

## 2024-08-29 ENCOUNTER — THERAPY VISIT (OUTPATIENT)
Dept: PHYSICAL THERAPY | Facility: CLINIC | Age: 39
End: 2024-08-29
Attending: PODIATRIST
Payer: COMMERCIAL

## 2024-08-29 DIAGNOSIS — M79.672 LEFT FOOT PAIN: Primary | ICD-10-CM

## 2024-08-29 DIAGNOSIS — M76.822 POSTERIOR TIBIAL TENDON DYSFUNCTION (PTTD) OF LEFT LOWER EXTREMITY: ICD-10-CM

## 2024-08-29 PROCEDURE — 97110 THERAPEUTIC EXERCISES: CPT | Mod: GP | Performed by: PHYSICAL THERAPIST

## 2024-08-29 PROCEDURE — 97140 MANUAL THERAPY 1/> REGIONS: CPT | Mod: GP | Performed by: PHYSICAL THERAPIST

## 2024-09-05 ENCOUNTER — THERAPY VISIT (OUTPATIENT)
Dept: PHYSICAL THERAPY | Facility: CLINIC | Age: 39
End: 2024-09-05
Payer: COMMERCIAL

## 2024-09-05 DIAGNOSIS — M76.822 POSTERIOR TIBIAL TENDON DYSFUNCTION (PTTD) OF LEFT LOWER EXTREMITY: ICD-10-CM

## 2024-09-05 DIAGNOSIS — M79.672 LEFT FOOT PAIN: Primary | ICD-10-CM

## 2024-09-05 PROCEDURE — 97110 THERAPEUTIC EXERCISES: CPT | Mod: GP | Performed by: PHYSICAL THERAPIST

## 2024-09-05 PROCEDURE — 97140 MANUAL THERAPY 1/> REGIONS: CPT | Mod: GP | Performed by: PHYSICAL THERAPIST

## 2024-09-05 NOTE — PROGRESS NOTES
Physical Therapy Progress Note       09/05/24 0500   Appointment Info   Signing clinician's name / credentials Saturnino Suazo, PT, DPT   Total/Authorized Visits 8   Visits Used 4   Medical Diagnosis L posterior tendonitis/ s/p Kidner procedure surgery   PT Tx Diagnosis L ankle pain / posterior tib tendonitis   Precautions/Limitations may be WBAT in shoe at 10 weeks   Other pertinent information 10 weeks post op Kidner procedure   Progress Note/Certification   Onset of illness/injury or Date of Surgery 06/26/24   Therapy Frequency 1x/ week   Predicted Duration 8 week   GOALS   PT Goals 2   PT Goal 1   Goal Identifier L ankle/ foot   Goal Description pt able to ambulate x 30 min with 1/10 pain withtout AD   Rationale to maximize safety and independence with performance of ADLs and functional tasks;to maximize safety and independence within the home;to maximize safety and independence within the community;to maximize safety and independence with transportation;to maximize safety and independence with self cares   Goal Progress 50% weight bearing, with 1-2 crutches, CAM boot, 10 minutes or so   Target Date 10/05/24   Subjective Report   Subjective Report Rough week. More soreness and swelling with less time weight bearing most of the week. Better the last few days again. Exercises felt fine while she was doing them but always felt worse longer she was on her feet.   Objective Measures   Objective Measures Objective Measure 2   Objective Measure 1   Objective Measure Gait   Details 50-75% weight bearing per patient, with 1-2 crutches, CAM boot, 10 minutes or so on feet at most comfortably   Objective Measure 2   Objective Measure ROM   Details AROM WNL   Treatment Interventions (PT)   Interventions Therapeutic Activity   Therapeutic Procedure/Exercise   Therapeutic Procedures: strength, endurance, ROM, flexibility minutes (70647) 24   Ther Proc 1 PROM L ankle   Ther Proc 1 - Details supine with light pressure   PTRx Ther  Proc 1 Ankle Eversion/Inversion ROM   PTRx Ther Proc 1 - Details x1 minute   PTRx Ther Proc 2 Ankle Active Range of Motion Dorsiflexion and Plantarflexion   PTRx Ther Proc 2 - Details x20   PTRx Ther Proc 3 Towel Gather   PTRx Ther Proc 3 - Details VR   PTRx Ther Proc 4 Ankle Circles in Supine or Long Sitting   PTRx Ther Proc 4 - Details x1 minute   PTRx Ther Proc 5 Towel Stretch Gastroc   PTRx Ther Proc 5 - Details 10 seconds x6 well tolerated    PTRx Ther Proc 6 Seated Toe Raises/Heel Raises   PTRx Ther Proc 6 - Details 2x10 edge of stool. Also reviewed isometric as option if pain returns.   PTRx Ther Proc 7 Toe Raises with Support   PTRx Ther Proc 7 - Details seated DF heel on step x20   Skilled Intervention HEP review and progressions based on pain   Patient Response/Progress No increase in pain. Good techniques throughout. Emphasized decreasing reps if any worsening of pain and frequent icing.   PTRx Ther Proc 8 Isometric Ankle Inversion   PTRx Ther Proc 8 - Details 10 holding for 5 seconds   PTRx Ther Proc 9 Isometric Ankle Eversion   PTRx Ther Proc 9 - Details 10 holding for 5 seconds   Manual Therapy   Manual Therapy: Mobilization, MFR, MLD, friction massage minutes (69764) 10   Manual Therapy Manual Therapy 2   Manual Therapy 1 STM   Manual Therapy 1 - Details Posterior tibialis, gastroc x10 minutes   Skilled Intervention STM and pain control, tissue extensibility   Patient Response/Progress Tolerated fair. Muscle knots are better but remains sore   Education   Learner/Method Patient;Demonstration;Pictures/Video   Plan   Home program PTRX   Plan for next session Add over the next few visits: mini squats or bridge, progress strengthening slowly   Total Session Time   Timed Code Treatment Minutes 34   Total Treatment Time (sum of timed and untimed services) 34         ASSESSMENT  Marlee Lloyd demonstrates improved ankle mobility (normal ROM) and improving strength but still fatigues quite easily with all  non-weight bearing AROM. Pain remains a challenge especially with weight bearing with the boot on as the day goes on. She has been very diligent with her home program despite daily setbacks. Continued skilled PT indicated to restore normal function and gait pattern.    PLAN  Continue 1x/week for 2 months    Beginning/End Dates of Progress Note Reporting Period:    to 09/05/2024    Referring Provider:  Gold Bazan

## 2024-09-06 ENCOUNTER — MYC MEDICAL ADVICE (OUTPATIENT)
Dept: PODIATRY | Facility: CLINIC | Age: 39
End: 2024-09-06

## 2024-09-06 ENCOUNTER — ANCILLARY PROCEDURE (OUTPATIENT)
Dept: GENERAL RADIOLOGY | Facility: CLINIC | Age: 39
End: 2024-09-06
Attending: PODIATRIST
Payer: COMMERCIAL

## 2024-09-06 ENCOUNTER — OFFICE VISIT (OUTPATIENT)
Dept: PODIATRY | Facility: CLINIC | Age: 39
End: 2024-09-06
Payer: COMMERCIAL

## 2024-09-06 VITALS — DIASTOLIC BLOOD PRESSURE: 82 MMHG | SYSTOLIC BLOOD PRESSURE: 124 MMHG | WEIGHT: 145 LBS | BODY MASS INDEX: 22.71 KG/M2

## 2024-09-06 DIAGNOSIS — M79.672 LEFT FOOT PAIN: ICD-10-CM

## 2024-09-06 DIAGNOSIS — M21.41 BILATERAL PES PLANUS: ICD-10-CM

## 2024-09-06 DIAGNOSIS — R60.9 POSTOPERATIVE EDEMA: ICD-10-CM

## 2024-09-06 DIAGNOSIS — Z98.890 POST-OPERATIVE STATE: ICD-10-CM

## 2024-09-06 DIAGNOSIS — Z98.890 POST-OPERATIVE STATE: Primary | ICD-10-CM

## 2024-09-06 DIAGNOSIS — M21.42 BILATERAL PES PLANUS: ICD-10-CM

## 2024-09-06 PROCEDURE — 73630 X-RAY EXAM OF FOOT: CPT | Mod: TC | Performed by: RADIOLOGY

## 2024-09-06 PROCEDURE — 99024 POSTOP FOLLOW-UP VISIT: CPT | Performed by: PODIATRIST

## 2024-09-06 NOTE — PATIENT INSTRUCTIONS
Thank you for choosing [facility] Podiatry / Foot & Ankle Surgery!    DR NAIK'S CLINIC:  [facility]   [address]   [place], [place] [address] [phone]    (Tues, Wed, Thur am, Fri pm)     [facility]  [address], [place] [address] [phone]    (Friday mornings)     TRIAGE LINE: [phone]  APPOINTMENTS: [phone]  RADIOLOGY: [phone]  SET UP SURGERY: [phone]  PHYSICAL THERAPY: [phone]   FAX NUMBER: [phone]  BILLING QUESTIONS: [phone]       Follow up:  Call in 1 week if not improved for MRI order. If improved transition out of boot to a shoe and follow up in 1 month and ankle brace.

## 2024-09-06 NOTE — LETTER
9/6/2024      Marlee Lloyd  [address]  [place] [address]      Dear Colleague,    Thank you for referring your patient, Marlee Lloyd, to the [facility] PODIATRY. Please see a copy of my visit note below.    Podiatry / Foot and Ankle Surgery Progress Note    September 6, 2024    Subject: Patient was seen for 10 weeks status post left Kidner procedure with Dr. Pope.  States that she was doing well putting 50% of the weight on her foot in the boot and then she went to full weightbearing in the boot and is noted more pain to the arch of the foot and the tendon.  She also had a kid fall on her foot about a week ago when she was on the boat and the side of her foot now hurts and she is wondering if anything happened with the bone.    Objective:  Vitals: /82   Wt 65.8 kg (145 lb)   BMI 22.71 kg/m    BMI= Body mass index is 22.71 kg/m .    General:  Patient is alert and orientated.  NAD.    Vascular:  DP and PT pulses are palpable.   edema noted to the left foot and leg but no varicosities noted.  CFT's < 3secs.  Skin temp is normal.    Neuro:  Light and gross touch sensation intact to digits, dorsum, and plantar aspects of the feet.    Derm:  Skin is supple.  No rashes, lesions, or ulcerations noted.    Musculoskeletal: Minimal pain on palpation along the posterior tibial tendon.  Minimal pain with eversion and inversion of the foot.  Decreased arch height bilaterally.    Imaging: Left foot x-ray  -I personally reviewed the xrays.  Nonweightbearing.  No fractures are noted.  Otherwise unremarkable.    Assessment:    Post-operative state  Postoperative edema  Left foot pain  Bilateral pes planus      Medical Decision Making/Plan: We discussed that the edema is not uncommon especially after surgery.  She was given an order for compression socks to wear to help with this.  As for the pain I am wondering if it is just the foot has been in the boot for a few months and is  more stiff and there is no arch support in the boot now that she is starting to walk on it.  Recommend putting in insert in the boot to see if this helps with arch support to the area.  If this does help she will transition out of the boot into an ankle brace in her shoe after the next week week and a half.  If she is still having a lot of pain with walking in the boot and the insert then she will call or MyChart us and I will put in an order for an MRI to assess for any possible tendon pathology.  We will get baseline x-rays today to see if there has been any further injury to the foot given that she had a person fall on it.  We will MyChart or call her with the results.    As long as x-rays are negative and she is progressing well with the insert in the ankle brace then we will have her follow-up in 1 month.    All questions were answered to patient satisfaction and she will call further questions or concerns.      Patient Risk Factor:  Patient is a low risk factor for infection.     Sri Faye DPM, Podiatry/Foot and Ankle Surgery      Again, thank you for allowing me to participate in the care of your patient.        Sincerely,        Sri Faye DPM, Podiatry/Foot and Ankle Surgery

## 2024-09-06 NOTE — TELEPHONE ENCOUNTER
DATE:  08/25/2018



CARDIOLOGY PROGRESS NOTE



Late entry for 08/25/2018.



SUBJECTIVE:  The patient is with persistent shortness of breath and heavy

proteinuria noted.



OBJECTIVE:

VITAL SIGNS:  Blood pressure 148/86, pulse 65, and respirations 18.

LUNGS:  Few rales.

HEART:  Regular rhythm and rate.  Normal S1, S2.  There is a fourth heart

sound.

ABDOMEN:  Soft.  Trace edema noted.



LABORATORY DATA:  White count 12.1, hemoglobin 7.6.  Albumin 2.7.

Bicarbonate 15, BUN 42, and creatinine 6.6.



IMPRESSION:

1. Worsening anemia, multifactorial.

2. Moderate protein-calorie malnutrition.

3. Chronic kidney disease, stage 5.

4. Acute myocardial infarction with congestive heart failure.



PLAN:

1. Continue beta-blocker, anti-platelet therapy, and blood pressure

titration.

2. Transfusion of packed red blood cells with concomitant diuresis.

3. Stress test for assessment of coronary flow reserve prior to

placement of AV fistula.









  ______________________________________________

  Edmar Munguia M.D.





DR:  MAY

D:  08/27/2018 02:40

T:  08/27/2018 13:03

JOB#:  [id number]

CC: Sanjuana Whalen,    The x-rays did not show any fracture on the outside of the foot.  Also shows that the bone where he had done the surgery does not show any changes.    Again we will have you try the insert in the boot and if that is doing well then you can transition to the ankle brace and shoes.  Please call us if this is not improving and we will put in an order for an MRI.    Sri Faye DPM

## 2024-09-06 NOTE — PROGRESS NOTES
Podiatry / Foot and Ankle Surgery Progress Note    September 6, 2024    Subject: Patient was seen for 10 weeks status post left Kidner procedure with Dr. Pope.  States that she was doing well putting 50% of the weight on her foot in the boot and then she went to full weightbearing in the boot and is noted more pain to the arch of the foot and the tendon.  She also had a kid fall on her foot about a week ago when she was on the boat and the side of her foot now hurts and she is wondering if anything happened with the bone.    Objective:  Vitals: /82   Wt 65.8 kg (145 lb)   BMI 22.71 kg/m    BMI= Body mass index is 22.71 kg/m .    General:  Patient is alert and orientated.  NAD.    Vascular:  DP and PT pulses are palpable.   edema noted to the left foot and leg but no varicosities noted.  CFT's < 3secs.  Skin temp is normal.    Neuro:  Light and gross touch sensation intact to digits, dorsum, and plantar aspects of the feet.    Derm:  Skin is supple.  No rashes, lesions, or ulcerations noted.    Musculoskeletal: Minimal pain on palpation along the posterior tibial tendon.  Minimal pain with eversion and inversion of the foot.  Decreased arch height bilaterally.    Imaging: Left foot x-ray  -I personally reviewed the xrays.  Nonweightbearing.  No fractures are noted.  Otherwise unremarkable.    Assessment:    Post-operative state  Postoperative edema  Left foot pain  Bilateral pes planus      Medical Decision Making/Plan: We discussed that the edema is not uncommon especially after surgery.  She was given an order for compression socks to wear to help with this.  As for the pain I am wondering if it is just the foot has been in the boot for a few months and is more stiff and there is no arch support in the boot now that she is starting to walk on it.  Recommend putting in insert in the boot to see if this helps with arch support to the area.  If this does help she will transition out of the boot into an  ankle brace in her shoe after the next week week and a half.  If she is still having a lot of pain with walking in the boot and the insert then she will call or MyChart us and I will put in an order for an MRI to assess for any possible tendon pathology.  We will get baseline x-rays today to see if there has been any further injury to the foot given that she had a person fall on it.  We will MyChart or call her with the results.    As long as x-rays are negative and she is progressing well with the insert in the ankle brace then we will have her follow-up in 1 month.    All questions were answered to patient satisfaction and she will call further questions or concerns.      Patient Risk Factor:  Patient is a low risk factor for infection.     Sri Faye DPM, Podiatry/Foot and Ankle Surgery

## 2024-09-12 ENCOUNTER — THERAPY VISIT (OUTPATIENT)
Dept: PHYSICAL THERAPY | Facility: CLINIC | Age: 39
End: 2024-09-12
Payer: COMMERCIAL

## 2024-09-12 DIAGNOSIS — M79.672 LEFT FOOT PAIN: Primary | ICD-10-CM

## 2024-09-12 DIAGNOSIS — M76.822 POSTERIOR TIBIAL TENDON DYSFUNCTION (PTTD) OF LEFT LOWER EXTREMITY: ICD-10-CM

## 2024-09-12 PROCEDURE — 97140 MANUAL THERAPY 1/> REGIONS: CPT | Mod: GP | Performed by: PHYSICAL THERAPIST

## 2024-09-12 PROCEDURE — 97110 THERAPEUTIC EXERCISES: CPT | Mod: GP | Performed by: PHYSICAL THERAPIST

## 2024-09-13 ENCOUNTER — MYC MEDICAL ADVICE (OUTPATIENT)
Dept: PODIATRY | Facility: CLINIC | Age: 39
End: 2024-09-13
Payer: COMMERCIAL

## 2024-09-13 DIAGNOSIS — M76.822 POSTERIOR TIBIAL TENDONITIS, LEFT: Primary | ICD-10-CM

## 2024-09-13 NOTE — TELEPHONE ENCOUNTER
I would recommend an MRI at this time.    I did place an order for this.  Please let her know that she can call to schedule this at;  [phone].    We will MyChart or call her once we get the results.    Please let patient know.     Thanks,     Sri Faye DPM

## 2024-09-13 NOTE — TELEPHONE ENCOUNTER
"Patient last seen by Dr. Faye on 9/6/24. She is s/p left Kidner procedure with Dr. Bazan on 6/26/24.    Plan per last office visit \"Recommend putting in insert in the boot to see if this helps with arch support to the area. If this does help she will transition out of the boot into an ankle brace in her shoe after the next week week and a half. If she is still having a lot of pain with walking in the boot and the insert then she will call or MyChart us and I will put in an order for an MRI to assess for any possible tendon pathology.\"     See patient update via [facility] and place MRI order if appropriate.     Tata Garsia, ATC  "

## 2024-09-16 ENCOUNTER — TRANSFERRED RECORDS (OUTPATIENT)
Dept: HEALTH INFORMATION MANAGEMENT | Facility: CLINIC | Age: 39
End: 2024-09-16
Payer: COMMERCIAL

## 2024-09-17 ENCOUNTER — MYC MEDICAL ADVICE (OUTPATIENT)
Dept: PODIATRY | Facility: CLINIC | Age: 39
End: 2024-09-17
Payer: COMMERCIAL

## 2024-09-17 DIAGNOSIS — M79.672 LEFT FOOT PAIN: Primary | ICD-10-CM

## 2024-09-17 DIAGNOSIS — M77.52 TENDINITIS OF LEFT FOOT: ICD-10-CM

## 2024-09-17 DIAGNOSIS — M76.822 POSTERIOR TIBIAL TENDINITIS, LEFT: ICD-10-CM

## 2024-09-17 NOTE — TELEPHONE ENCOUNTER
Sanjuana Whalen,    I got your MRI results back.  Good news is there is no fractures or tendon tears.  It just shows little bit of inflammation around the tendon and the bone.  Could be more of a bone and tendon bruise from the kid falling on the foot.    Because of the inflammation around the tendon and the bone even though there is no tear or fracture I may recommend physical therapy with was called iontophoresis.  That is cortisone patches that the use to try to help calm down inflammation to these areas.  We do not like to inject into tendons because that can cause them to rupture.    If you would like an order for this just let me know and I can place it.  Usually we send the cortisone medication to a [place] pharmacy and you pick that up and you bring that to your physical therapy appointment.    Sri Faye DPM

## 2024-09-19 ENCOUNTER — THERAPY VISIT (OUTPATIENT)
Dept: PHYSICAL THERAPY | Facility: CLINIC | Age: 39
End: 2024-09-19
Payer: COMMERCIAL

## 2024-09-19 DIAGNOSIS — M76.822 POSTERIOR TIBIAL TENDON DYSFUNCTION (PTTD) OF LEFT LOWER EXTREMITY: ICD-10-CM

## 2024-09-19 DIAGNOSIS — M79.672 LEFT FOOT PAIN: Primary | ICD-10-CM

## 2024-09-19 PROCEDURE — 97140 MANUAL THERAPY 1/> REGIONS: CPT | Mod: GP | Performed by: PHYSICAL THERAPIST

## 2024-09-19 PROCEDURE — 97110 THERAPEUTIC EXERCISES: CPT | Mod: GP | Performed by: PHYSICAL THERAPIST

## 2024-09-20 RX ORDER — DEXAMETHASONE SODIUM PHOSPHATE 4 MG/ML
INJECTION, SOLUTION INTRA-ARTICULAR; INTRALESIONAL; INTRAMUSCULAR; INTRAVENOUS; SOFT TISSUE
Qty: 30 ML | Refills: 0 | Status: SHIPPED | OUTPATIENT
Start: 2024-09-20

## 2024-09-20 NOTE — TELEPHONE ENCOUNTER
Patient would like physical therapy and iontophoresis.   She plans to  the Dexamethasone at the [facility] tomorrow morning.   Please complete orders.     ATTILA Goldman RN

## 2024-09-20 NOTE — TELEPHONE ENCOUNTER
Orders signed for PT and dexamethasone.    ANNETTA Kay,    Ordered for the therapy and the medication.  You should be able to pick that up tomorrow morning.    Sri Faye DPM

## 2024-09-26 ENCOUNTER — THERAPY VISIT (OUTPATIENT)
Dept: PHYSICAL THERAPY | Facility: CLINIC | Age: 39
End: 2024-09-26
Payer: COMMERCIAL

## 2024-09-26 DIAGNOSIS — M77.52 TENDINITIS OF LEFT FOOT: ICD-10-CM

## 2024-09-26 DIAGNOSIS — M79.672 LEFT FOOT PAIN: ICD-10-CM

## 2024-09-26 DIAGNOSIS — M76.822 POSTERIOR TIBIAL TENDINITIS, LEFT: ICD-10-CM

## 2024-09-26 DIAGNOSIS — M76.822 POSTERIOR TIBIAL TENDON DYSFUNCTION (PTTD) OF LEFT LOWER EXTREMITY: Primary | ICD-10-CM

## 2024-09-26 PROCEDURE — 97110 THERAPEUTIC EXERCISES: CPT | Mod: GP | Performed by: PHYSICAL THERAPIST

## 2024-09-26 PROCEDURE — 97033 APP MDLTY 1+IONTPHRSIS EA 15: CPT | Mod: GP | Performed by: PHYSICAL THERAPIST

## 2024-10-02 ENCOUNTER — OFFICE VISIT (OUTPATIENT)
Dept: PODIATRY | Facility: CLINIC | Age: 39
End: 2024-10-02
Payer: COMMERCIAL

## 2024-10-02 VITALS — DIASTOLIC BLOOD PRESSURE: 78 MMHG | SYSTOLIC BLOOD PRESSURE: 122 MMHG | BODY MASS INDEX: 22.71 KG/M2 | WEIGHT: 145 LBS

## 2024-10-02 DIAGNOSIS — M21.41 BILATERAL PES PLANUS: ICD-10-CM

## 2024-10-02 DIAGNOSIS — M79.672 LEFT FOOT PAIN: Primary | ICD-10-CM

## 2024-10-02 DIAGNOSIS — G57.92 NEURITIS OF LEFT FOOT: ICD-10-CM

## 2024-10-02 DIAGNOSIS — M21.42 BILATERAL PES PLANUS: ICD-10-CM

## 2024-10-02 DIAGNOSIS — M76.822 POSTERIOR TIBIAL TENDONITIS, LEFT: ICD-10-CM

## 2024-10-02 PROCEDURE — 99213 OFFICE O/P EST LOW 20 MIN: CPT | Performed by: PODIATRIST

## 2024-10-02 NOTE — PROGRESS NOTES
Podiatry / Foot and Ankle Surgery Progress Note    October 2, 2024    Subject: Patient was seen for 14 weeks status post left Kidner procedure with Dr. Pope.  Notes that she is still having quite a bit of pain.  Did have to wear the boot 1 or 2 days.  States that she has a constant burning pain to the area.  Worse when her foot is hanging and relaxed.  Has had 1 session of physical therapy with iontophoresis.    Objective:  Vitals: /78   Wt 65.8 kg (145 lb)   BMI 22.71 kg/m    BMI= Body mass index is 22.71 kg/m .    General:  Patient is alert and orientated.  NAD.    Vascular:  DP and PT pulses are palpable.   edema noted to the left foot and leg but no varicosities noted.  CFT's < 3secs.  Skin temp is normal.     Neuro:  Light and gross touch sensation intact to digits, dorsum, and plantar aspects of the feet.     Derm:  Skin is supple.  No rashes, lesions, or ulcerations noted.     Musculoskeletal: Minimal pain on palpation along the posterior tibial tendon.  Minimal pain with eversion and inversion of the foot.  Decreased arch height bilaterally.     Imaging: Left foot x-ray  -I personally reviewed the xrays.  Nonweightbearing.  No fractures are noted.  Otherwise unremarkable.    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  Chronic sequela of an intermediate.  Lateral ligamentous brain injury.  Nonspecific mild intramuscular edema throughout the intrinsic foot musculature which may reflect mild strain.  Mild to moderate tibiotalar and mild subtalar joint effusions.  No chondromalacia or osteochondral lesion/defect.  No other ligamentous or myotendinous abnormality.  No fracture or osseous stress reaction.     Assessment:     Left foot pain  Posterior tibial tendonitis, left  Bilateral pes planus  Neuritis of left foot       Medical  Decision Making/Plan: Long discussion with patient about the MRI results.  There is not appear to be any tendon tearing.  Because she continues to have pinpointed pain right at the insertion I am wondering about neuritis as well as tendinitis.  We will have her continue with physical therapy a few more sessions of the iontophoresis to see if this will calm it down.  Also recommend Voltaren gel over the incision area 3 times a day for the next 2 to 3 weeks to see if this will help.  Talked about gabapentin to try to help with any nerve related symptoms but she would like to hold off on that at this time.  We talked about going back in and surgically removing some more of the bone however she is not interested in any further surgery at this time.       All questions were answered to patient satisfaction and she will call further questions or concerns.        Patient Risk Factor:  Patient is a low risk factor for infection.      Sri Faye DPM, Podiatry/Foot and Ankle Surgery

## 2024-10-02 NOTE — PATIENT INSTRUCTIONS
Thank you for choosing [facility] Podiatry / Foot & Ankle Surgery!    DR NAIK'S CLINIC:  [facility]   [address]   [place], [place] [address] [phone]    (Tues, Wed, Thur am, Fri pm)     [facility]  [address], [place] [address] [phone]    (Friday mornings)     TRIAGE LINE: [phone]  APPOINTMENTS: [phone]  RADIOLOGY: [phone]  SET UP SURGERY: [phone]  PHYSICAL THERAPY: [phone]   FAX NUMBER: [phone]  BILLING QUESTIONS: [phone]       Follow up: 4-5 weeks.      Apply Voltaren gel to the incision area 3 times a day next 2 to 3 weeks.    Transition from the ankle brace to shoes and inserts.

## 2024-10-02 NOTE — LETTER
10/2/2024      Marlee Lloyd  [address]  [facility] [address]      Dear Colleague,    Thank you for referring your patient, Marlee Lloyd, to the [facility]. Please see a copy of my visit note below.    Podiatry / Foot and Ankle Surgery Progress Note    October 2, 2024    Subject: Patient was seen for 14 weeks status post left Kidner procedure with Dr. Pope.  Notes that she is still having quite a bit of pain.  Did have to wear the boot 1 or 2 days.  States that she has a constant burning pain to the area.  Worse when her foot is hanging and relaxed.  Has had 1 session of physical therapy with iontophoresis.    Objective:  Vitals: /78   Wt 65.8 kg (145 lb)   BMI 22.71 kg/m    BMI= Body mass index is 22.71 kg/m .    General:  Patient is alert and orientated.  NAD.    Vascular:  DP and PT pulses are palpable.   edema noted to the left foot and leg but no varicosities noted.  CFT's < 3secs.  Skin temp is normal.     Neuro:  Light and gross touch sensation intact to digits, dorsum, and plantar aspects of the feet.     Derm:  Skin is supple.  No rashes, lesions, or ulcerations noted.     Musculoskeletal: Minimal pain on palpation along the posterior tibial tendon.  Minimal pain with eversion and inversion of the foot.  Decreased arch height bilaterally.     Imaging: Left foot x-ray  -I personally reviewed the xrays.  Nonweightbearing.  No fractures are noted.  Otherwise unremarkable.    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  Chronic sequela of an intermediate.  Lateral ligamentous brain injury.  Nonspecific mild intramuscular edema throughout the intrinsic foot musculature which may reflect mild strain.  Mild to moderate tibiotalar and mild subtalar joint effusions.  No chondromalacia or  osteochondral lesion/defect.  No other ligamentous or myotendinous abnormality.  No fracture or osseous stress reaction.     Assessment:     Left foot pain  Posterior tibial tendonitis, left  Bilateral pes planus  Neuritis of left foot       Medical Decision Making/Plan: Long discussion with patient about the MRI results.  There is not appear to be any tendon tearing.  Because she continues to have pinpointed pain right at the insertion I am wondering about neuritis as well as tendinitis.  We will have her continue with physical therapy a few more sessions of the iontophoresis to see if this will calm it down.  Also recommend Voltaren gel over the incision area 3 times a day for the next 2 to 3 weeks to see if this will help.  Talked about gabapentin to try to help with any nerve related symptoms but she would like to hold off on that at this time.  We talked about going back in and surgically removing some more of the bone however she is not interested in any further surgery at this time.       All questions were answered to patient satisfaction and she will call further questions or concerns.        Patient Risk Factor:  Patient is a low risk factor for infection.      Sri Faye DPM, Podiatry/Foot and Ankle Surgery      Again, thank you for allowing me to participate in the care of your patient.        Sincerely,        Sri Fyae DPM, Podiatry/Foot and Ankle Surgery

## 2024-10-03 ENCOUNTER — MYC MEDICAL ADVICE (OUTPATIENT)
Dept: PODIATRY | Facility: CLINIC | Age: 39
End: 2024-10-03

## 2024-10-03 ENCOUNTER — THERAPY VISIT (OUTPATIENT)
Dept: PHYSICAL THERAPY | Facility: CLINIC | Age: 39
End: 2024-10-03
Payer: COMMERCIAL

## 2024-10-03 DIAGNOSIS — M76.822 POSTERIOR TIBIAL TENDON DYSFUNCTION (PTTD) OF LEFT LOWER EXTREMITY: ICD-10-CM

## 2024-10-03 DIAGNOSIS — G57.90 NEURITIS OF FOOT, UNSPECIFIED LATERALITY: Primary | ICD-10-CM

## 2024-10-03 DIAGNOSIS — M79.672 LEFT FOOT PAIN: Primary | ICD-10-CM

## 2024-10-03 PROCEDURE — 97033 APP MDLTY 1+IONTPHRSIS EA 15: CPT | Mod: GP | Performed by: PHYSICAL THERAPIST

## 2024-10-03 PROCEDURE — 97110 THERAPEUTIC EXERCISES: CPT | Mod: GP | Performed by: PHYSICAL THERAPIST

## 2024-10-03 RX ORDER — GABAPENTIN 100 MG/1
100 CAPSULE ORAL 3 TIMES DAILY
Qty: 90 CAPSULE | Refills: 0 | Status: SHIPPED | OUTPATIENT
Start: 2024-10-03 | End: 2024-11-02

## 2024-10-03 NOTE — TELEPHONE ENCOUNTER
Sanjuana Whalen,    The fraying is from where he remove the extra accessory navicular bone and from how he attached the tendon back to the bone. I looked at his op note and he used a [employer] fiber tack anchor x 1 to attach your tendon back down to the bone (it is a bioabsorbable screw, it is not metal so we can not see in on xray).    I put in the order for the gabapentin for you at your pharmacy to see if this can help with some nerve pain.     EDWINA KayM

## 2024-10-03 NOTE — TELEPHONE ENCOUNTER
Patient seen 10/2/24 in follow up for left Kidner procedure with Dr. Pope, DOS 6/26/24.     Please see [employer] message and advise on response. Pharmacy selected in [place].     Tata Garsia, ATC

## 2024-10-07 ENCOUNTER — THERAPY VISIT (OUTPATIENT)
Dept: PHYSICAL THERAPY | Facility: CLINIC | Age: 39
End: 2024-10-07
Payer: COMMERCIAL

## 2024-10-07 DIAGNOSIS — M79.672 LEFT FOOT PAIN: Primary | ICD-10-CM

## 2024-10-07 DIAGNOSIS — M76.822 POSTERIOR TIBIAL TENDON DYSFUNCTION (PTTD) OF LEFT LOWER EXTREMITY: ICD-10-CM

## 2024-10-07 PROCEDURE — 97033 APP MDLTY 1+IONTPHRSIS EA 15: CPT | Mod: GP | Performed by: PHYSICAL THERAPIST

## 2024-10-08 RX ORDER — LIDOCAINE, MENTHOL 4.8; 1.2 G/120G; G/120G
GEL ORAL 2 TIMES DAILY
Qty: 118.3 ML | Refills: 2 | Status: SHIPPED | OUTPATIENT
Start: 2024-10-08

## 2024-10-08 NOTE — TELEPHONE ENCOUNTER
I recommend stop taking the gabapentin.   Will switch to a topical nerve cream to see if this will help.  This was sent to her pharmacy.    Please let patient know.    THanks,     Sri Faye DPM

## 2024-10-08 NOTE — TELEPHONE ENCOUNTER
See patient MyChart message and advise on response regarding possible medication side effect.     Tata Garsia, ATC

## 2024-10-11 ENCOUNTER — THERAPY VISIT (OUTPATIENT)
Dept: PHYSICAL THERAPY | Facility: CLINIC | Age: 39
End: 2024-10-11
Payer: COMMERCIAL

## 2024-10-11 DIAGNOSIS — M79.672 LEFT FOOT PAIN: Primary | ICD-10-CM

## 2024-10-11 DIAGNOSIS — M76.822 POSTERIOR TIBIAL TENDON DYSFUNCTION (PTTD) OF LEFT LOWER EXTREMITY: ICD-10-CM

## 2024-10-11 PROCEDURE — 97110 THERAPEUTIC EXERCISES: CPT | Mod: GP | Performed by: PHYSICAL THERAPIST

## 2024-10-11 PROCEDURE — 97033 APP MDLTY 1+IONTPHRSIS EA 15: CPT | Mod: GP | Performed by: PHYSICAL THERAPIST

## 2024-10-15 ENCOUNTER — MYC MEDICAL ADVICE (OUTPATIENT)
Dept: PODIATRY | Facility: CLINIC | Age: 39
End: 2024-10-15
Payer: COMMERCIAL

## 2024-10-15 NOTE — TELEPHONE ENCOUNTER
Other: Patient called back. She is available to answer the phone now, if she for some reason does not answer she said it's okay to leave a detailed message.      Could we send this information to you in [employer] or would you prefer to receive a phone call?:   Patient would prefer a phone call   Okay to leave a detailed message?: Yes at Cell number on file:    Telephone Information:   Mobile [phone]

## 2024-10-15 NOTE — TELEPHONE ENCOUNTER
I am not sure what else to do for her at this point.   Could do a pain management referral to see if she has possible complex regional pain syndrome.  we also had talked about going in and surgically removing the bioabsorbable screw that was placed for the repair of the Kidner but Dr. Pope did.  If she would like a pain management referral which is what I would recommend I can put that in.  She would like to discuss maybe further surgery and going in and removing the bioabsorbable screw that I would have her follow-up in clinic.    Please let patient know.    Sri Faye DPM

## 2024-10-15 NOTE — TELEPHONE ENCOUNTER
Please see [employer] message.   There is no consent to communicate on file.     Left voicemail asking for a return call. Asked that if she has to leave a message to provide a good time to reach her and if we have permission to leave a detailed message.     ATTILA Goldman RN

## 2024-10-15 NOTE — TELEPHONE ENCOUNTER
Patient had Kidner procedure with resection of ostial externum, debridement of PT tendon and remodeling of the navicular tuberosity by Dr. Bazan on 6/26/24.   Last office visit with Dr. Faye : 10/2/24    Please see Mychart update.     Phone call to patient . She states her foot used to turn purple when dependent. Now the whole foot has been turning red and swollen, is warm and tingly off and on for the past 3 days. It goes away at night and returns the next day. She does not feel it is infection and is from swelling.   She has been attending physical therapy and having iontophoresis. She is unable to do most of the exercise due to pain.   She has stopped the lidocaine patches because they made her skin turn red and really weren't helping,     Will discuss with provider and get back with her.     Next available appointment is 10/22/24 acute slot with Dr. Faye.     Please advise if appointment on 10/22/24 is appropriate and/or what is recommended.     ATTILA Goldman RN

## 2024-10-16 ENCOUNTER — TELEPHONE (OUTPATIENT)
Dept: PODIATRY | Facility: CLINIC | Age: 39
End: 2024-10-16
Payer: COMMERCIAL

## 2024-10-16 DIAGNOSIS — M79.672 LEFT FOOT PAIN: Primary | ICD-10-CM

## 2024-10-16 DIAGNOSIS — T84.84XA PAINFUL ORTHOPAEDIC HARDWARE (H): ICD-10-CM

## 2024-10-16 DIAGNOSIS — G57.92 NEURITIS OF LEFT FOOT: ICD-10-CM

## 2024-10-16 NOTE — TELEPHONE ENCOUNTER
Please see note below.    Patient scheduled a 15 minute appointment only on 10/30/24.   There are no 30 minute appointments until 11/5/24.   Please advise if patient can be seen sooner in an acute slot or what is recommended.     ATTILA Goldman RN

## 2024-10-16 NOTE — TELEPHONE ENCOUNTER
Appointment     Reason for Call: Patient is requesting to be scheduled sooner than next available    Reason for visit: Discuss and Plan Surgery    Is this a new or return visit: Return    Have you been treated for this in the past? Yes    Additional comments: Pt and Dr. Faye have been talking through [employer].  Pt is scheduled for 10/30.  She's in a lot of pain and struggling with mobility.  Is there any way Dr. Faye could work her in sooner than the 30th? She'd like to try to set up the pre-op if someone could call her or message her on [employer] to give her a rough idea of when to do a pre op and when the surgery might be that would be helpful for her planning.     Thank you.    Could we send this information to you in [facility] or would you prefer to receive a phone call?:   No preference   Okay to leave a detailed message?: Yes at Cell number on file:    Telephone Information:   Mobile [phone]

## 2024-10-16 NOTE — TELEPHONE ENCOUNTER
I did put an order in for surgery.  I do not know exactly when surgery would be scheduled as my schedule will have to.  If there is any availability at the hospitals in the time that she will contact patient about this.    She can use an acute slot if she needs to be seen sooner however I am not quite sure what else I can do from the pain standpoint at this time.  We could try an injection to see if that would numb the area locally for nerve pain.    Please let patient know.    Thanks,     Sri Faye DPM

## 2024-10-18 ENCOUNTER — THERAPY VISIT (OUTPATIENT)
Dept: PHYSICAL THERAPY | Facility: CLINIC | Age: 39
End: 2024-10-18
Payer: COMMERCIAL

## 2024-10-18 DIAGNOSIS — M76.822 POSTERIOR TIBIAL TENDON DYSFUNCTION (PTTD) OF LEFT LOWER EXTREMITY: ICD-10-CM

## 2024-10-18 DIAGNOSIS — M79.672 LEFT FOOT PAIN: Primary | ICD-10-CM

## 2024-10-18 PROCEDURE — 97110 THERAPEUTIC EXERCISES: CPT | Mod: GP | Performed by: PHYSICAL THERAPIST

## 2024-10-18 PROCEDURE — 97530 THERAPEUTIC ACTIVITIES: CPT | Mod: GP | Performed by: PHYSICAL THERAPIST

## 2024-10-18 PROCEDURE — 97033 APP MDLTY 1+IONTPHRSIS EA 15: CPT | Mod: GP | Performed by: PHYSICAL THERAPIST

## 2024-10-18 NOTE — TELEPHONE ENCOUNTER
Phone call to patient.     She became tearful on the phone. She states today she has been having pins and needles feelings in 2nd through 4th toes and the top of her foot. She has decreased sensation but can feel herself touch her foot. The color changes of her foot continue. It is bluish gray when she wakes up and the turns a bright red throughout the day. She reports some twitching in her foot as well. She has cramping that goes all the way up to her buttocks. She was informed of recommendations below. Appointment scheduled for 10/22/24 at 10:00 am with 9:45 am check in. She verbalized understanding.     ATTILA Goldman RN

## 2024-10-22 ENCOUNTER — OFFICE VISIT (OUTPATIENT)
Dept: PODIATRY | Facility: CLINIC | Age: 39
End: 2024-10-22
Payer: COMMERCIAL

## 2024-10-22 VITALS — DIASTOLIC BLOOD PRESSURE: 72 MMHG | BODY MASS INDEX: 22.71 KG/M2 | SYSTOLIC BLOOD PRESSURE: 118 MMHG | WEIGHT: 145 LBS

## 2024-10-22 DIAGNOSIS — M21.42 BILATERAL PES PLANUS: ICD-10-CM

## 2024-10-22 DIAGNOSIS — M21.41 BILATERAL PES PLANUS: ICD-10-CM

## 2024-10-22 DIAGNOSIS — M32.9 SYSTEMIC LUPUS ERYTHEMATOSUS, UNSPECIFIED SLE TYPE, UNSPECIFIED ORGAN INVOLVEMENT STATUS (H): ICD-10-CM

## 2024-10-22 DIAGNOSIS — M35.00 SJOGREN'S SYNDROME, WITH UNSPECIFIED ORGAN INVOLVEMENT (H): ICD-10-CM

## 2024-10-22 DIAGNOSIS — T84.84XA PAINFUL ORTHOPAEDIC HARDWARE (H): ICD-10-CM

## 2024-10-22 DIAGNOSIS — G57.92 NEURITIS OF FOOT, LEFT: ICD-10-CM

## 2024-10-22 DIAGNOSIS — M79.672 LEFT FOOT PAIN: Primary | ICD-10-CM

## 2024-10-22 PROCEDURE — 99214 OFFICE O/P EST MOD 30 MIN: CPT | Performed by: PODIATRIST

## 2024-10-22 NOTE — PROGRESS NOTES
Podiatry / Foot and Ankle Surgery Progress Note    October 22, 2024    Subject: Patient was seen for follow-up for continued pain to the left foot.  Notes that she started to get some more tingling numbness and pain to the bottom of the foot into the toes over this last week.  Does note that lidocaine patches or lidocaine gel does help with some of the pain where nothing else seems to help.  Wondering what else can be done for the foot.    Objective:  Vitals: /72   Wt 65.8 kg (145 lb)   BMI 22.71 kg/m    BMI= Body mass index is 22.71 kg/m .      General:  Patient is alert and orientated.  NAD.    Vascular:  DP and PT pulses are palpable.   edema noted to the left foot and leg but no varicosities noted.  CFT's < 3secs.  Skin temp is normal.     Neuro:  Light and gross touch sensation intact to digits, dorsum, and plantar aspects of the feet.     Derm:  Skin is supple.  No rashes, lesions, or ulcerations noted.     Musculoskeletal: Minimal pain on palpation along the posterior tibial tendon.  Minimal pain with eversion and inversion of the foot.  Decreased arch height bilaterally.     Imaging: Left foot x-ray  -I personally reviewed the xrays.  Nonweightbearing.  No fractures are noted.  Otherwise unremarkable.     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  Chronic sequela of an intermediate.  Lateral ligamentous brain injury.  Nonspecific mild intramuscular edema throughout the intrinsic foot musculature which may reflect mild strain.  Mild to moderate tibiotalar and mild subtalar joint effusions.  No chondromalacia or osteochondral lesion/defect.  No other ligamentous or myotendinous abnormality.  No fracture or osseous stress reaction.     Assessment:     Left foot pain  Posterior tibial tendonitis, left  Bilateral pes planus  Neuritis of left  foot        Medical Decision Making/Plan: Had another long discussion with patient about options.  She continues to have what appears to be nerve pain and likely has an entrapped nerve inflamed nerve over the incision area.  We also talked about going in and removing the suture tack to see make sure she is not having any allergic reaction to that and removing some of the bone spurring still seen on x-ray.  We discussed that if we do remove the nerve if it looks thickened and inflamed that this could cause some symptoms that the to the side of the foot and toes. Talked about risks including infection, numbness, continued pain, recurrence, need for further surgery, blood loss, blood clotting. You will scar.  This would be a same-day procedure under general anesthesia.  She would need another preop history and physical.  She would be minimal weightbearing in a boot for about 3 to 4 weeks until the stitches came out and then we would start transitioning to shoes.    At this point she would like to proceed with surgery.  Will put an order in to my surgery scheduler.      All questions were answered to patient satisfaction and she will call further questions or concerns.        Patient Risk Factor:  Patient is a low risk factor for infection..     Sri Faye DPM, Podiatry/Foot and Ankle Surgery

## 2024-10-22 NOTE — PATIENT INSTRUCTIONS
Thank you for choosing [facility] / [facility] & Ankle Surgery!    DR NAIK'S CLINIC:  [facility]   [address]   [place] [address]  [phone]    (Tues, Wed, Thur am, Fri pm)     [facility]  [address], [place] [address]  [phone]  (Every other Friday morning)    [facility]  [address] [place], [place] [id number] [phone]  (Every other Friday)     TRIAGE LINE: [phone]  APPOINTMENTS: [phone]  RADIOLOGY: [phone]  SET UP SURGERY: [phone]  PHYSICAL THERAPY: [phone]   FAX NUMBER: [phone]  BILLING QUESTIONS: [phone]       Follow up: Surgery      GETTING READY FOR YOUR SURGERY  ONE-THREE WEEKS BEFORE  1. See your Family Doctor or Primary Care Doctor for a History and Physical. If you do not, we may need to change the date of your surgery.  2. Please see pre-surgical medications below to which medications need to be stopped before surgery and when.    TEN OR MORE DAYS BEFORE    1. [place] with the hospital. (For [facility])      By Phone: [phone].      By Internet: [email or web address]. [facility].      If you do not register by phone or online, we will call to help you register.    SAME DAY SURGERY PATIENTS  1. You will need a family member of friend to drive you home. If you do not have one the surgery will be cancelled or rescheduled.  2. You will need a responsible adult to stay with you that night after the surgery.       We will ask this person to listen to some instructions before you leave the hospital.  * If your child is having surgery, and you would like a tour of the hospital, please call: [phone].      DAY BEFORE SURGERY  1. DO NOT EAT OR DRINK ANYTHING AFTER MIDNIGHT THE NIGHT BEFORE YOUR SURGERY!   2. DO NOT DRINK ALCOHOL.  3. Do not take over the counter drugs.  4. Some people need to have blood tests at the hospital. If you need  blood tests, we will tell you in advance.  5. Take medications as directed by your doctor. You may take these with a small amount of water.  6. Do not chew gum, chew tobacco, or suck on hard candy the day of surgery.  7. Bring your insurance cards, a list of your medicines and co-pays you might need. Leave jewelry and other valuables at home.  8. If you received papers at your doctor's office, bring these with you to the surgery.    If you have questions about these instructions, please call: [phone]  Ask to speak with a pre-admitting nurse.    PRESURGICAL MEDICATIONS:  Certain prescription, over-the-counter, and herbal medications interfere with healing after an operation. The main concern relates to medications that increase bleeding at the surgical site. Excess blood under the incision results in poor wound healing, excess pain, increased scarring, and a higher risk of infection.    Some medications slow the healing process of bone. Medications can also interfere with the anesthesia drugs that keep you asleep during the operation. It is important to ensure that these medications are out of your system prior to the operation. The list below details a number of medications that are of concern. Pay special attention to how long you should avoid these medications before your operation. Please note that this list is not complete. You should ask your surgeon or pharmacist if you are uncertain about other medications. Any herbal supplement not listed should be discontinued at least one week prior to surgery.    Aspirin: Hold for one week prior to surgery and restart the day after surgery. This over the counter medication promotes bleeding.    Motrin / Ibuprofen / Aleve / Advil / NSAIDS:  Stop one week prior to surgery. These medications affect bleeding and may cause delay in bone healing. Avoid taking these medications for six weeks after bone surgeries. Other procedures may allow you to restart sooner than 6 weeks  after surgery.    Coumadin / Plavix: Your primary care provider will manage Coumadin in relation to surgery. Coumadin may result in excessive bleeding and may be adjusted before and after surgery.    Enbriel: Stop two weeks prior to surgery and restart two weeks after surgery. This medication can effect soft tissue healing and increases the risk of infection.    Remicade: Stop 8-12 weeks before surgery and restart two weeks after surgery. This medication can affect soft tissue healing and increases the risk of infection.    Humira: Stop 4 weeks before surgery and restart two weeks after surgery. This medication can affect soft tissue healing and increases the risk of infection.    Methotrexate: Stop one dose prior to surgery. This medication will be restarted when the wound appears to be healing well. Please ask your surgeon about restarting this medication when you are being seen in the office for wound checks.    Kava: Stop at least one day prior to surgery and may restart one day after surgery. Kava may increase the sedative effect of anesthetics that are given during the operation. Kava can also increase bleeding at the surgical site.    Ephedra (ma fisher): Stop at least one day prior to surgery and may restart one day after surgery.  Ephedra may increase the risk of heart attack and stroke. This medication can also increase bleeding at the surgical site.    Dariel's Wort: Stop at least five days before surgery and may restart one day after surgery. Caroline's wort may diminish the effects of several drugs that are given during surgery.    Ginseng: Stop at least one week prior to surgery and may restart one day after surgery.  Ginseng lowers blood sugar and may increase bleeding at the surgical site.    Ginkgo: Stop 36 hours before surgery and may restart one day after surgery. Ginkgo may increase bleeding at the surgical site.    Garlic: Stop at least one week prior to surgery and may restart one day after.  Garlic may increase bleeding at the surgery site.    Valerian: Do a slow steady decrease in your daily dose over a period of 2-3 weeks before surgery to decrease the chance of withdrawal symptoms. Valerian may increase the sedative effect of anesthetics given during the operation.    Echinacea: There is no data on stopping echinacea prior to surgery. This medication though can be associated with allergic reactions and suppression of your immune system.    Vitamin E, Omega-3, Flax, Fish Oil, Glucosamine and Chondroitin: Stop 2 weeks prior to surgery and may restart one day after. These herbal medications can increase risk of bleeding at surgical site.      POST OPERATIVE HOME CARE INSTRUCTIONS  Activities: You should rest today. Stay off your feet as much as possible and keep your foot elevated above the level of your heart (about two pillow height). Wear your surgical shoe at all times when up. Limit walking to 5 to 10 minutes per hour over the next few days if your doctor has previously told you that you can put some weight on the foot after surgery, although limit the weight to your heel. If you are supposed to be non-weight bearing, that means NO WEIGHT AT ALL ON THE FOOT. Use an ice pack on the ankle while awake 20 minutes per hour to help decrease pain and swelling.     Discomfort: If a prescription for pain was given, take as directed. If no pain medication was ordered, you may take a non-prescription, non-aspirin pain medication. If the pain is not relieved by pain medication, call the clinic.     Incision and Dressing: Your surgical dressing is a sterile dressing and should be left in place until removed by your physician. Keep the dressing dry by covering it with a plastic bag for showers, taking baths with the surgical foot out of the tub, or sponge bathing. Some bleeding on the dressing should be expected. If however, you notice active or excessive bleeding or a temperature over 100 degrees by mouth, call  the clinic. Do not change dressing by yourself.  If the dressing becomes wet or dirty, please call the clinic as it may need a new sterile dressing applied. You may start getting the foot wet after the stitches are removed.     Do not wear regular shoes with a surgical bandage and/or external pins in your foot. Wear loose fitting clothing that easily will slip over the bandage and/or pins. Do not cover your surgical foot with blankets as they may damage the dressing/pins. Also, remember that dogs are not aware of your surgery. Please keep them away from the bandage/pins.   If your surgeon places external pins in your foot, you must keep the foot dry until the pins are removed at 6-8 weeks after the surgery. Pins should be covered with a dressing for protection. You should examine the pins and your skin often. Check for any spreading redness or yellow drainage from the pin areas. Do not apply ointment around the pins. Do not push a loose pin back into your foot. Please call the clinic if the pin is spinning or moving in and out. If the pins are bumped or loosened they may need to be removed early. This may affect your surgical outcome.   Please call the clinic if you feel there is a problem with your pins and/or surgical bandage.    TIPS FOR SUCCESSFUL HEALING  How you care for the surgical site is critically important to achieve a successful result after surgery. Avoidance of injury, infection, excess swelling, scar tissue and stiffness are highly dependent on the care you provide over the next six weeks. Please do not hesitate to call if you have questions or concerns.   Your foot requires significant rest and elevation. Sitting for long hours with your foot elevated, however, will create its own problems. Expect muscle aches, back pain, cramps, etc. Optimal posture, lumbar support, back exercises, ice and heat may all help with your new aches and pains. Do not apply a heating pad to your foot or leg as this can  cause increase swelling and pain. Rather use ice in those areas.   Pain medications cause drowsiness. You may frequently sleep during the day and then have trouble sleeping at night. Over the counter sleep aids might be more effective than narcotic pain medication to achieve a reasonable night's sleep.    Narcotic pain medications and inactivity lead to constipation. Limiting use of narcotics will help minimize this problem. The pain medications will not completely alleviate your pain. The purpose of pain pills is to take the edge off and help you get through the first few days. You can substitute Extra Strength Tylenol if pain is mild. Please note that narcotic pain pills usually contain acetaminophen (the active ingredient in Tylenol) so be careful to avoid the maximum dose of acetaminophen. You should take measures to avoid constipation by drinking plenty of water, eating lots of fruit and vegetables and taking the recommended dose of Metamucil or a similar fiber supplement. These measures should be continued for as long as you require narcotic pain medications and are inactive.     Showering is a major challenge. Your incision requires about three days to become sealed from water. Your bandage should not get wet and should not be removed. Do not attempt showering for the first three days. A sponge bath is preferred. You may attempt to shower on the fourth day after the operation. Your foot should be covered with a bag, tape and rubber bands. Double bagging is preferred. Standing in the shower with a bag on your foot is quite hazardous. A portable shower stool would be ideal. The bandage will need to be changed in the office if it becomes moistened. A moist bandage will not dry on its own. A moist dressing may lead to infection.   Stiffness will develop after any operation due to scarring. The scar tissue begins to form immediately after the surgery. Inactivity can cause excess stiffness and may lead to blood  clots in your legs. Frequent range of motion exercises will help decrease stiffness, blood clots, scar tissue and adhesions. Please call if you are unsure about these recommendations.   Good luck and best wishes on a prompt recovery. Healing is slow but an important step in your recovery. You are in control of the final result. Please use this time wisely. Please do not hesitate to call if you have questions, concerns or comments.    * If you have any post-operative questions or concerns regarding your procedure, call our triage team at the [facility] at [phone].     GETTING READY FOR YOUR SURGERY  ONE-THREE WEEKS BEFORE  1. See your Family Doctor or Primary Care Doctor for a History and Physical. If you do not, we may need to change the date of your surgery.  2. Please see pre-surgical medications below to which medications need to be stopped before surgery and when.    TEN OR MORE DAYS BEFORE    1. [place] with the hospital. (For [facility])      By Phone: [phone].      By Internet: [email or web address]. Choose [facility].      If you do not register by phone or online, we will call to help you register.    SAME DAY SURGERY PATIENTS  1. You will need a family member of friend to drive you home. If you do not have one the surgery will be cancelled or rescheduled.  2. You will need a responsible adult to stay with you that night after the surgery.       We will ask this person to listen to some instructions before you leave the hospital.  * If your child is having surgery, and you would like a tour of the hospital, please call: [phone].      DAY BEFORE SURGERY  1. DO NOT EAT OR DRINK ANYTHING AFTER MIDNIGHT THE NIGHT BEFORE YOUR SURGERY!   2. DO NOT DRINK ALCOHOL.  3. Do not take over the counter drugs.  4. Some people need to have blood tests at the hospital. If you need blood tests, we will tell you in advance.  5. Take medications as directed by your  doctor. You may take these with a small amount of water.  6. Do not chew gum, chew tobacco, or suck on hard candy the day of surgery.  7. Bring your insurance cards, a list of your medicines and co-pays you might need. Leave jewelry and other valuables at home.  8. If you received papers at your doctor's office, bring these with you to the surgery.    If you have questions about these instructions, please call: [phone]  Ask to speak with a pre-admitting nurse.    PRESURGICAL MEDICATIONS:  Certain prescription, over-the-counter, and herbal medications interfere with healing after an operation. The main concern relates to medications that increase bleeding at the surgical site. Excess blood under the incision results in poor wound healing, excess pain, increased scarring, and a higher risk of infection.    Some medications slow the healing process of bone. Medications can also interfere with the anesthesia drugs that keep you asleep during the operation. It is important to ensure that these medications are out of your system prior to the operation. The list below details a number of medications that are of concern. Pay special attention to how long you should avoid these medications before your operation. Please note that this list is not complete. You should ask your surgeon or pharmacist if you are uncertain about other medications. Any herbal supplement not listed should be discontinued at least one week prior to surgery.    Aspirin: Hold for one week prior to surgery and restart the day after surgery. This over the counter medication promotes bleeding.    Motrin / Ibuprofen / Aleve / Advil / NSAIDS:  Stop one week prior to surgery. These medications affect bleeding and may cause delay in bone healing. Avoid taking these medications for six weeks after bone surgeries. Other procedures may allow you to restart sooner than 6 weeks after surgery.    Coumadin / Plavix: Your primary care provider will manage  Coumadin in relation to surgery. Coumadin may result in excessive bleeding and may be adjusted before and after surgery.    Enbriel: Stop two weeks prior to surgery and restart two weeks after surgery. This medication can effect soft tissue healing and increases the risk of infection.    Remicade: Stop 8-12 weeks before surgery and restart two weeks after surgery. This medication can affect soft tissue healing and increases the risk of infection.    Humira: Stop 4 weeks before surgery and restart two weeks after surgery. This medication can affect soft tissue healing and increases the risk of infection.    Methotrexate: Stop one dose prior to surgery. This medication will be restarted when the wound appears to be healing well. Please ask your surgeon about restarting this medication when you are being seen in the office for wound checks.    Kava: Stop at least one day prior to surgery and may restart one day after surgery. Kava may increase the sedative effect of anesthetics that are given during the operation. Kava can also increase bleeding at the surgical site.    Ephedra (ma fisher): Stop at least one day prior to surgery and may restart one day after surgery.  Ephedra may increase the risk of heart attack and stroke. This medication can also increase bleeding at the surgical site.    [place]'s Wort: Stop at least five days before surgery and may restart one day after surgery. Dariel's wort may diminish the effects of several drugs that are given during surgery.    Ginseng: Stop at least one week prior to surgery and may restart one day after surgery.  Ginseng lowers blood sugar and may increase bleeding at the surgical site.    Ginkgo: Stop 36 hours before surgery and may restart one day after surgery. Ginkgo may increase bleeding at the surgical site.    Garlic: Stop at least one week prior to surgery and may restart one day after. Garlic may increase bleeding at the surgery site.    Valerian: Do a slow  steady decrease in your daily dose over a period of 2-3 weeks before surgery to decrease the chance of withdrawal symptoms. Valerian may increase the sedative effect of anesthetics given during the operation.    Echinacea: There is no data on stopping echinacea prior to surgery. This medication though can be associated with allergic reactions and suppression of your immune system.    Vitamin E, Omega-3, Flax, Fish Oil, Glucosamine and Chondroitin: Stop 2 weeks prior to surgery and may restart one day after. These herbal medications can increase risk of bleeding at surgical site.     POTENTIAL COMPLICATIONS OF FOOT & ANKLE SURGERY  Undergoing a surgical procedure involves a certain amount of risk. Risks of complications vary depending on the complexity of the surgery and how you take care of the surgical area during the healing process. Complications can range from minor infection to death. Some complications are temporary while others will be permanent.  Your surgeon weighs the risk of complications vs the potential benefit of undergoing surgery. You need to consider your tolerance for unexpected problems as you decide whether to undergo surgery.    Foot and Ankle surgery involves cutting skin, bone, ligaments, blood vessels and joints.  These structures heal well but not without consequence. Any break to the skin can lead to infection. A deep infection involves bones or joints which can be devastating. Deep infection can lead to amputation or could spread to other parts of your body. Most infections are minor and easily treated with oral antibiotics. Infections are often times from bacteria already present on your skin. Proper care of the surgical site is an essential component of avoiding infection. Do not get the bandage wet and take proper care of external pins to avoid these problems.     Joint stiffness is inherent to any foot or ankle surgery. Joint surgery is a major component of reconstructive foot and  ankle procedures. The ligaments and tissues around the joint are cut, and later repaired. Scare tissue limits joint mobility. This can be permanent but generally improves over the course of one year.    Surgery involves dissection around nerves. Visible nerves are moved out of the way while very small nerves are cut. Scar tissue develops around nerves and can lead to nerve pain, numbness, or neuromas. Nerve symptoms can be permanent. This can lead to numbness or sometimes hypersensitivity to touch and problems wearing shoes.    Bones do not always heal after surgery. Poor healing after a bone cut or joint fusion can lead to an extended period of casting or repeat surgery. Electronic bone stimulators are sometimes used to stimulate poor healing of bone. Nonunion is when joint fusion does not take.  This can occur as often as 10% of the time. Smoking doubles your risk of poor bone healing to 20%.    Bone grafting is sometimes necessary during the original or subsequent surgery. Bone is sometimes taken from other parts of your body or freeze dried bone from a bone bank from a bone bank or synthetic bone material might be used.    A scar is always present after foot and ankle surgery. The scar will be visible and could be sensitive. Some people develop excessive scarring, which cannot be controlled by the surgeon. Scars can be unsightly and can restrict joint mobility.    Blood clots can develop in the calf after surgery. Foot and ankle surgery is a predisposing factor for blood clots. The blood clot could break and travel to your lung.  This condition can lead to death. Early warning signs could include calf swelling and pain, chest pain or shortness of breath. This is an emergency that requires immediate attention by a medical doctor!    Surgery will not necessarily create a pain-free foot. Even normal feet hurt. Crooked toes, bunions, neuromas, flat feet and arthritis should all be considered permanent conditions.   Ankle pain commonly requires multiple surgeries over a lifetime. Do not assume that having surgery will permanently fix your condition. You may need permanent alteration in shoes and activities to accommodate your foot and ankle problem.    Careful attention to post-operative recommendations will dramatically reduce your risk of complications. Proper dressing, wound care, elevation and rest will be essential to get the wound healed and minimize scarring. Strict attention to activity restrictions, such as non-weight bearing, or partial weight bearing is essential. Internal fixation devices may not resist the stress of walking. Some select surgeries allow the patient to walk, however this should be very minimal.    Despite these concerns, foot and ankle surgery leads to a high level of patient satisfaction. Your surgeon would not recommend surgery if he/she did not expect your foot to improve. Talk to your surgeon about any of the above issues.    POST OPERATIVE HOME CARE INSTRUCTIONS  Activities: You should rest today. Stay off your feet as much as possible and keep your foot elevated above the level of your heart (about two pillow height). Wear your surgical shoe at all times when up. Limit walking to 5 to 10 minutes per hour over the next few days if your doctor has previously told you that you can put some weight on the foot after surgery, although limit the weight to your heel. If you are supposed to be non-weight bearing, that means NO WEIGHT AT ALL ON THE FOOT. Use an ice pack on the ankle while awake 20 minutes per hour to help decrease pain and swelling.     Discomfort: If a prescription for pain was given, take as directed. If no pain medication was ordered, you may take a non-prescription, non-aspirin pain medication. If the pain is not relieved by pain medication, call the clinic.     Incision and Dressing: Your surgical dressing is a sterile dressing and should be left in place until removed by your  physician. Keep the dressing dry by covering it with a plastic bag for showers, taking baths with the surgical foot out of the tub, or sponge bathing. Some bleeding on the dressing should be expected. If however, you notice active or excessive bleeding or a temperature over 100 degrees by mouth, call the clinic. Do not change dressing by yourself.  If the dressing becomes wet or dirty, please call the clinic as it may need a new sterile dressing applied. You may start getting the foot wet after the stitches are removed.     Do not wear regular shoes with a surgical bandage and/or external pins in your foot. Wear loose fitting clothing that easily will slip over the bandage and/or pins. Do not cover your surgical foot with blankets as they may damage the dressing/pins. Also, remember that dogs are not aware of your surgery. Please keep them away from the bandage/pins.   If your surgeon places external pins in your foot, you must keep the foot dry until the pins are removed at 6-8 weeks after the surgery. Pins should be covered with a dressing for protection. You should examine the pins and your skin often. Check for any spreading redness or yellow drainage from the pin areas. Do not apply ointment around the pins. Do not push a loose pin back into your foot. Please call the clinic if the pin is spinning or moving in and out. If the pins are bumped or loosened they may need to be removed early. This may affect your surgical outcome.   Please call the clinic if you feel there is a problem with your pins and/or surgical bandage.    TIPS FOR SUCCESSFUL HEALING  How you care for the surgical site is critically important to achieve a successful result after surgery. Avoidance of injury, infection, excess swelling, scar tissue and stiffness are highly dependent on the care you provide over the next six weeks. Please do not hesitate to call if you have questions or concerns.   Your foot requires significant rest and  elevation. Sitting for long hours with your foot elevated, however, will create its own problems. Expect muscle aches, back pain, cramps, etc. Optimal posture, lumbar support, back exercises, ice and heat may all help with your new aches and pains. Do not apply a heating pad to your foot or leg as this can cause increase swelling and pain. Rather use ice in those areas.   Pain medications cause drowsiness. You may frequently sleep during the day and then have trouble sleeping at night. Over the counter sleep aids might be more effective than narcotic pain medication to achieve a reasonable night's sleep.    Narcotic pain medications and inactivity lead to constipation. Limiting use of narcotics will help minimize this problem. The pain medications will not completely alleviate your pain. The purpose of pain pills is to take the edge off and help you get through the first few days. You can substitute Extra Strength Tylenol if pain is mild. Please note that narcotic pain pills usually contain acetaminophen (the active ingredient in Tylenol) so be careful to avoid the maximum dose of acetaminophen. You should take measures to avoid constipation by drinking plenty of water, eating lots of fruit and vegetables and taking the recommended dose of Metamucil or a similar fiber supplement. These measures should be continued for as long as you require narcotic pain medications and are inactive.     Showering is a major challenge. Your incision requires about three days to become sealed from water. Your bandage should not get wet and should not be removed. Do not attempt showering for the first three days. A sponge bath is preferred. You may attempt to shower on the fourth day after the operation. Your foot should be covered with a bag, tape and rubber bands. Double bagging is preferred. Standing in the shower with a bag on your foot is quite hazardous. A portable shower stool would be ideal. The bandage will need to be changed  in the office if it becomes moistened. A moist bandage will not dry on its own. A moist dressing may lead to infection.   Stiffness will develop after any operation due to scarring. The scar tissue begins to form immediately after the surgery. Inactivity can cause excess stiffness and may lead to blood clots in your legs. Frequent range of motion exercises will help decrease stiffness, blood clots, scar tissue and adhesions. Please call if you are unsure about these recommendations.   Good luck and best wishes on a prompt recovery. Healing is slow but an important step in your recovery. You are in control of the final result. Please use this time wisely. Please do not hesitate to call if you have questions, concerns or comments.    * If you have any post-operative questions or concerns regarding your procedure, call our triage team at the [facility] at [phone] (option 4).

## 2024-10-22 NOTE — LETTER
10/22/2024      Marlee Lloyd  [address]  [facility] [address]      Dear Colleague,    Thank you for referring your patient, Marlee Lloyd, to the [facility] POD[facility]. Please see a copy of my visit note below.    Podiatry / Foot and Ankle Surgery Progress Note    October 22, 2024    Subject: Patient was seen for follow-up for continued pain to the left foot.  Notes that she started to get some more tingling numbness and pain to the bottom of the foot into the toes over this last week.  Does note that lidocaine patches or lidocaine gel does help with some of the pain where nothing else seems to help.  Wondering what else can be done for the foot.    Objective:  Vitals: /72   Wt 65.8 kg (145 lb)   BMI 22.71 kg/m    BMI= Body mass index is 22.71 kg/m .      General:  Patient is alert and orientated.  NAD.    Vascular:  DP and PT pulses are palpable.   edema noted to the left foot and leg but no varicosities noted.  CFT's < 3secs.  Skin temp is normal.     Neuro:  Light and gross touch sensation intact to digits, dorsum, and plantar aspects of the feet.     Derm:  Skin is supple.  No rashes, lesions, or ulcerations noted.     Musculoskeletal: Minimal pain on palpation along the posterior tibial tendon.  Minimal pain with eversion and inversion of the foot.  Decreased arch height bilaterally.     Imaging: Left foot x-ray  -I personally reviewed the xrays.  Nonweightbearing.  No fractures are noted.  Otherwise unremarkable.     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  Chronic sequela of an intermediate.  Lateral ligamentous brain injury.  Nonspecific mild intramuscular edema throughout the intrinsic foot musculature which may reflect mild strain.  Mild to moderate tibiotalar and mild subtalar joint effusions.   No chondromalacia or osteochondral lesion/defect.  No other ligamentous or myotendinous abnormality.  No fracture or osseous stress reaction.     Assessment:     Left foot pain  Posterior tibial tendonitis, left  Bilateral pes planus  Neuritis of left foot        Medical Decision Making/Plan: Had another long discussion with patient about options.  She continues to have what appears to be nerve pain and likely has an entrapped nerve inflamed nerve over the incision area.  We also talked about going in and removing the suture tack to see make sure she is not having any allergic reaction to that and removing some of the bone spurring still seen on x-ray.  We discussed that if we do remove the nerve if it looks thickened and inflamed that this could cause some symptoms that the to the side of the foot and toes. Talked about risks including infection, numbness, continued pain, recurrence, need for further surgery, blood loss, blood clotting. You will scar.  This would be a same-day procedure under general anesthesia.  She would need another preop history and physical.  She would be minimal weightbearing in a boot for about 3 to 4 weeks until the stitches came out and then we would start transitioning to shoes.    At this point she would like to proceed with surgery.  Will put an order in to my surgery scheduler.      All questions were answered to patient satisfaction and she will call further questions or concerns.        Patient Risk Factor:  Patient is a low risk factor for infection..     Sri Faye DPM, Podiatry/Foot and Ankle Surgery      Again, thank you for allowing me to participate in the care of your patient.        Sincerely,        Sri Faye DPM, Podiatry/Foot and Ankle Surgery

## 2024-10-25 ENCOUNTER — THERAPY VISIT (OUTPATIENT)
Dept: PHYSICAL THERAPY | Facility: CLINIC | Age: 39
End: 2024-10-25
Payer: COMMERCIAL

## 2024-10-25 ENCOUNTER — MYC MEDICAL ADVICE (OUTPATIENT)
Dept: PODIATRY | Facility: CLINIC | Age: 39
End: 2024-10-25

## 2024-10-25 DIAGNOSIS — M79.672 LEFT FOOT PAIN: Primary | ICD-10-CM

## 2024-10-25 DIAGNOSIS — M76.822 POSTERIOR TIBIAL TENDON DYSFUNCTION (PTTD) OF LEFT LOWER EXTREMITY: ICD-10-CM

## 2024-10-25 PROCEDURE — 97110 THERAPEUTIC EXERCISES: CPT | Mod: GP | Performed by: PHYSICAL THERAPIST

## 2024-10-25 NOTE — PROGRESS NOTES
Physical Therapy Progress Note       10/25/24 0500   Appointment Info   Signing clinician's name / credentials Saturnino Suazo, PT, DPT   Total/Authorized Visits 12   Visits Used 10   Medical Diagnosis L posterior tendonitis/ s/p Kidner procedure surgery   PT Tx Diagnosis L ankle pain / posterior tib tendonitis   Progress Note/Certification   Onset of illness/injury or Date of Surgery 06/26/24   Therapy Frequency 1x/ week   Predicted Duration 12 weeks total   GOALS   PT Goals 2   PT Goal 1   Goal Identifier L ankle/ foot   Goal Description pt able to ambulate x 30 min with 1/10 pain withtout AD   Rationale to maximize safety and independence with performance of ADLs and functional tasks;to maximize safety and independence within the home;to maximize safety and independence within the community;to maximize safety and independence with transportation;to maximize safety and independence with self cares   Goal Progress 1 crutch today, shoe, brace   Target Date 10/31/24   Subjective Report   Subjective Report Feels good with plan to proceed with surgery to take out screw, alleviate nerve pressure and remove any scar tissue. Unsure when surgery will be -- weeks vs. months.   Objective Measures   Objective Measures Objective Measure 5   Objective Measure 1   Objective Measure Gait   Details 1 crutch today, shoe, brace   Objective Measure 2   Objective Measure ROM   Details AROM WNL   Objective Measure 3   Objective Measure Pain:   Details current: 2/10; worst last 24 hrs: 8/10   Objective Measure 4   Objective Measure palpation   Details tender distal to scar   Objective Measure 5   Objective Measure Observation   Details foot purple and cold compared to opposite foot   PT Modalities   PT Modalities Iontophoresis   Iontophoresis   Iontophoresis -Type Electrode   Intensity 4mA   Frequency 40 mA*min   Location area of slight swelling distal to scar near arch   Electrode Size medium   Medication 2cc's dexamethasone   Patient  Response/Progress Tolerated well without discomfort. No immediate benefit but has been feeling better the last 5 days or so now.   Treatment Interventions (PT)   Interventions Therapeutic Activity   Therapeutic Procedure/Exercise   Therapeutic Procedures: strength, endurance, ROM, flexibility minutes (40178) 25   Ther Proc 1 PF red band   Ther Proc 1 - Details x2 min   Ther Proc 2 DF red band   Ther Proc 2 - Details x2 min   Ther Proc 3 PF and DF isometric   Ther Proc 3 - Details x2 minutes each   PTRx Ther Proc 1 Gastroc stretch longsitting   PTRx Ther Proc 1 - Details 30 sec x3   PTRx Ther Proc 2 Bridge   PTRx Ther Proc 2 - Details x15 no pain   PTRx Ther Proc 3 3 way leg lift   PTRx Ther Proc 3 - Details -flex, abd and ext x15 each   Skilled Intervention added leg strengthening to maintain strength   Patient Response/Progress Tolerated fair without setbacks. Bridge caused foot to be a bit irritated.   Therapeutic Activity   Therapeutic Activities: dynamic activities to improve functional performance minutes (26800) 4   Ther Act 1 Review of strategies to reduce stress on foot   Ther Act 1 - Details -continue using crutches as needed, avoid any extreme motions, stop any activity if pain worsens. Will reduce activity for next 7-14 days to allow for inflammation to decrease and gradually add leg strengthening back in if tolerated   Education   Learner/Method Patient;Demonstration;Pictures/Video   Plan   Home program print off   Updates to plan of care Discharge from physical therapy and have patient complete maintenance program.   Plan for next session Continue HEP independently   Total Session Time   Timed Code Treatment Minutes 29   Total Treatment Time (sum of timed and untimed services) 29         ASSESSMENT  Marlee Lloyd continues to have significant pain, impaired mobility and decreased function due to foot and leg pain. Pain seems primarily nerve related given referral up leg and numbness into foot. She  does have a few exercises that are well tolerated and not pain provoking so will continue these but discontinue the rest.    PLAN  Discharge physical therapy and await surgery. Continue HEP focusing on maintain PF/DF strength, multi-directional leg lifts and limit walking to sub-painful amount as able.    Beginning/End Dates of Progress Note Reporting Period:    to 10/25/2024    Referring Provider:  Gold Bazan

## 2024-11-07 ENCOUNTER — MYC MEDICAL ADVICE (OUTPATIENT)
Dept: ORTHOPEDICS | Facility: CLINIC | Age: 39
End: 2024-11-07

## 2024-11-07 ENCOUNTER — HOSPITAL ENCOUNTER (OUTPATIENT)
Facility: CLINIC | Age: 39
End: 2024-11-07
Attending: PODIATRIST | Admitting: PODIATRIST
Payer: COMMERCIAL

## 2024-11-07 RX ORDER — CLINDAMYCIN PHOSPHATE 900 MG/50ML
900 INJECTION, SOLUTION INTRAVENOUS SEE ADMIN INSTRUCTIONS
Status: CANCELLED | OUTPATIENT
Start: 2024-11-07

## 2024-11-07 RX ORDER — CLINDAMYCIN PHOSPHATE 900 MG/50ML
900 INJECTION, SOLUTION INTRAVENOUS
Status: CANCELLED | OUTPATIENT
Start: 2024-11-07

## 2024-11-20 ENCOUNTER — TRANSFERRED RECORDS (OUTPATIENT)
Dept: HEALTH INFORMATION MANAGEMENT | Facility: CLINIC | Age: 39
End: 2024-11-20
Payer: COMMERCIAL

## 2024-11-24 ENCOUNTER — MYC MEDICAL ADVICE (OUTPATIENT)
Dept: PODIATRY | Facility: CLINIC | Age: 39
End: 2024-11-24
Payer: COMMERCIAL

## 2024-11-25 NOTE — TELEPHONE ENCOUNTER
BAKARI Escalante fiber grecia has a screw/button that is drilled into the bone. It has sutures attached to it to tie down tendon or ligaments. It is not metal which is why you can't see it on xray.  That is why we call it a biocomposite screw.  I don't really know how else to explain it.  I attached pictures from the manufacture website below. The suture knots may be prominent and pressing on the nerve or the nerve may be entraped like we talked about. Because the MRI did not show any pathology, but you continue to have pain in that area, that is why we talked about going in surgically to open up soft tissue to try to help with a possible impinged nerve and or remove the fiber grecia.     I am not 100 % sure that is what is causing the pain. That's why we talked about both the nerve entrapment possibility and the fiber grecia removal in hopes to help reduce pain.     I hope this helps.    Sri Faye DPM        FiberTak  Button  The FiberTak button is an all-suture button that can be used in lieu of traditional metal buttons for both bicortical and unicortical tension slide tenodesis techniques. The button is a reinforced 2.6 mm diameter sheath preloaded with two 2-0 FiberLink  sutures that are used to shuttle the whipstitch limbs through the sheath.    Features & Benefits    Button first technique to help eliminate the need to find the drill hole  2-0 FiberLink sutures colored blue and white/black for easy identification  No metal-FiberTak is not visible on x-ray  17% stronger than metal buttons1  2.6 mm drill reduces the bone socket 19% over the traditional 3.2 mm bone socket commonly used for metal buttons

## 2024-12-20 ENCOUNTER — TRANSFERRED RECORDS (OUTPATIENT)
Dept: HEALTH INFORMATION MANAGEMENT | Facility: CLINIC | Age: 39
End: 2024-12-20
Payer: COMMERCIAL

## 2025-01-07 ENCOUNTER — TELEPHONE (OUTPATIENT)
Dept: PODIATRY | Facility: CLINIC | Age: 40
End: 2025-01-07
Payer: COMMERCIAL

## 2025-01-07 NOTE — TELEPHONE ENCOUNTER
Other: Patient is calling to cancel surgery on 1/23 per her preference wants to try other options      Could we send this information to you in [facility] or would you prefer to receive a phone call?:   Patient would prefer a phone call   Okay to leave a detailed message?: Yes at Cell number on file:    Telephone Information:   Mobile [phone]

## 2025-01-29 ENCOUNTER — PATIENT OUTREACH (OUTPATIENT)
Dept: CARE COORDINATION | Facility: CLINIC | Age: 40
End: 2025-01-29
Payer: COMMERCIAL

## 2025-02-21 PROBLEM — Z82.0 FAMILY HISTORY OF MS (MULTIPLE SCLEROSIS): Status: ACTIVE | Noted: 2017-05-11

## 2025-02-21 PROBLEM — G90.522 COMPLEX REGIONAL PAIN SYNDROME TYPE 1 OF LEFT LOWER EXTREMITY: Status: ACTIVE | Noted: 2025-02-21

## 2025-03-05 ENCOUNTER — TRANSFERRED RECORDS (OUTPATIENT)
Dept: HEALTH INFORMATION MANAGEMENT | Facility: CLINIC | Age: 40
End: 2025-03-05
Payer: COMMERCIAL

## 2025-03-11 ENCOUNTER — ANCILLARY PROCEDURE (OUTPATIENT)
Dept: MAMMOGRAPHY | Facility: CLINIC | Age: 40
End: 2025-03-11
Attending: PHYSICIAN ASSISTANT
Payer: COMMERCIAL

## 2025-03-11 DIAGNOSIS — Z12.31 VISIT FOR SCREENING MAMMOGRAM: ICD-10-CM

## 2025-04-08 ENCOUNTER — OFFICE VISIT (OUTPATIENT)
Dept: FAMILY MEDICINE | Facility: CLINIC | Age: 40
End: 2025-04-08
Payer: COMMERCIAL

## 2025-04-08 VITALS
BODY MASS INDEX: 23.79 KG/M2 | OXYGEN SATURATION: 100 % | HEIGHT: 68 IN | HEART RATE: 68 BPM | RESPIRATION RATE: 16 BRPM | DIASTOLIC BLOOD PRESSURE: 81 MMHG | SYSTOLIC BLOOD PRESSURE: 135 MMHG | TEMPERATURE: 97.3 F | WEIGHT: 157 LBS

## 2025-04-08 DIAGNOSIS — M35.00 SJOGREN'S SYNDROME, WITH UNSPECIFIED ORGAN INVOLVEMENT: ICD-10-CM

## 2025-04-08 DIAGNOSIS — Z01.818 PRE-OP EXAM: Primary | ICD-10-CM

## 2025-04-08 DIAGNOSIS — E06.3 HYPOTHYROIDISM DUE TO HASHIMOTO THYROIDITIS: ICD-10-CM

## 2025-04-08 DIAGNOSIS — M79.672 LEFT FOOT PAIN: ICD-10-CM

## 2025-04-08 PROCEDURE — 3075F SYST BP GE 130 - 139MM HG: CPT | Performed by: INTERNAL MEDICINE

## 2025-04-08 PROCEDURE — 99214 OFFICE O/P EST MOD 30 MIN: CPT | Performed by: INTERNAL MEDICINE

## 2025-04-08 PROCEDURE — 3079F DIAST BP 80-89 MM HG: CPT | Performed by: INTERNAL MEDICINE

## 2025-04-08 NOTE — PROGRESS NOTES
Preoperative Evaluation  [address] [address]  Phone: [phone]  Primary Provider: Lyudmila Salas PA-C  Pre-op Performing Provider: Ezra Hernandez MD  Apr 8, 2025 4/8/2025   Surgical Information   What procedure is being done? partial bone excision possible tendon repair in foot   [facility]   Who is doing the procedure / surgery? Dr. Dex Jimenez   Date of surgery / procedure: april 14   Time of surgery / procedure: tbd   Where do you plan to recover after surgery? at home with family     Fax number for surgical facility: [phone]    Assessment & Plan     1.  Preop physical exam completed.  Patient is medically optimized to undergo planned surgical procedure.  2.  Left foot pain.  Status post excision of accessory navicular and repair of posterior tibial tendon July 2024 with persistent pain ever since.  Weightbearing and activity left foot limited by pain.  Currently using crutches.  3.  Hypothyroidism, Hashimoto's type adequately controlled.  Last TSH therapeutic at 2.26 on 2/21/2025.  4.  Sjogren syndrome with unspecified organ involvement.  Currently not much symptomatic.      The proposed surgical procedure is considered INTERMEDIATE risk.    Recommendation  Approval given to proceed with proposed procedure, without further diagnostic evaluation.    Anita Whalen is a 39 year old, presenting for the following:  Pre-Op Exam          4/8/2025     1:39 PM   Additional Questions   Roomed by Fozia     HPI:   39-year-old young lady with hypothyroidism and Sjogren's is to undergo left foot surgery for persistent severe and pain syndrome.  She has been symptomatic ever since she had left foot surgery in July 2024.  At that time her pain was felt to be related to accessory navicular and had undergone an excision of the accessory navicular and repair of  posterior tibial tendon.  She has failed conservative treatment including PRP injection 1/16/2025.    She is non-smoker and does not drink alcohol.  Follows a good diet.        4/8/2025   Pre-Op Questionnaire   Have you ever had a heart attack or stroke? No   Have you ever had surgery on your heart or blood vessels, such as a stent placement, a coronary artery bypass, or surgery on an artery in your head, neck, heart, or legs? No   Do you have chest pain with activity? No   Do you have a history of heart failure? No   Do you currently have a cold, bronchitis or symptoms of other infection? No   Do you have a cough, shortness of breath, or wheezing? No   Do you or anyone in your family have previous history of blood clots? No   Do you or does anyone in your family have a serious bleeding problem such as prolonged bleeding following surgeries or cuts? No   Have you ever had problems with anemia or been told to take iron pills? No   Have you had any abnormal blood loss such as black, tarry or bloody stools, or abnormal vaginal bleeding? No   Have you ever had a blood transfusion? No   Are you willing to have a blood transfusion if it is medically needed before, during, or after your surgery? Yes   Have you or any of your relatives ever had problems with anesthesia? (!) YES    Do you have sleep apnea, excessive snoring or daytime drowsiness? No   Do you have any artifical heart valves or other implanted medical devices like a pacemaker, defibrillator, or continuous glucose monitor? No   Do you have artificial joints? No   Are you allergic to latex? No     Health Care Directive  Patient does not have a Health Care Directive:       Patient Active Problem List    Diagnosis Date Noted    Complex regional pain syndrome type 1 of left lower extremity 02/21/2025     Priority: Medium    Posterior tibial tendon dysfunction (PTTD) of left lower extremity 06/07/2024     Priority: Medium    Left foot pain 04/27/2024     Priority:  "Medium    Abnormal Pap smear of cervix 03/27/2024     Priority: Medium     Formatting of this note might be different from the original.   10/26/2011 Abnormal pap \"2 in past, repeats normal\" noted in scans dated 4/15/2015    06/20/2012 NIL   10/19/2020 NIL/HPV negative       Plan Routine screening      Systemic lupus erythematosus, unspecified SLE type, unspecified organ involvement status (H) 02/20/2024     Priority: Medium    Postsurgical hypothyroidism 12/23/2022     Priority: Medium    Sjogren's syndrome      Priority: Medium    History of COVID-19 01/26/2022     Priority: Medium     Formatting of this note might be different from the original.   01/15/2022      Family history of MS (multiple sclerosis) 05/11/2017     Priority: Medium     Remitting-recurring MS of brother diagnosed in 30s.      Labor and delivery indication for care or intervention 04/10/2015     Priority: Medium    Fetal abnormality affecting management of mother 01/16/2015     Priority: Medium    Unspecified high-risk pregnancy 09/09/2014     Priority: Medium     Formatting of this note might be different from the original.   REASON FOR CONSULT: Previous Patient                          PRIMARY DIAGNOSIS: +SSA, + SSB antibodies          SLE         Sjogrens Syndrome          Hx of LEEP         Hx of thyroidectomy         Hashimotos      LAST GROWTH:      TESTING PLANS:       JUNIE:      REFERRING PHYSICIAN & PHONE:  Previous patient      SPECIALISTS: Dr Og- rheumatologist      MEDS:       BLOOD TYPE/LABS:      GENETICS: DATE:                 COUNSELOR:                PLAN:      PLAN OF CARE:      SS-A antibody positive 11/14/2012     Priority: Medium    Hypothyroidism 10/26/2010     Priority: Medium      Past Medical History:   Diagnosis Date    Sjogren's syndrome      Past Surgical History:   Procedure Laterality Date    AS RAD RESEC TONSIL/PILLARS      EXCISE EXOSTOSIS FOOT Left 6/26/2024    Procedure: LEFT KIDNER PROCEDURE;  Surgeon: " "Gold Bazan, DPDIANA;  Location: [facility] OR    TUBAL LIGATION  2015    wisdom teeth       Current Outpatient Medications   Medication Sig Dispense Refill    FLUBLOK 0.5 ML injection Inject 0.5 mLs into the muscle once.      levothyroxine (SYNTHROID/LEVOTHROID) 125 MCG tablet Take 1 tablet (125 mcg) by mouth daily. 90 tablet 1    multivitamin w/minerals (MULTI-VITAMIN) tablet Take 1 tablet by mouth daily      Vitamin D, Cholecalciferol, 25 MCG (1000 UT) TABS 2 once a day         Allergies   Allergen Reactions    Bactrim [Sulfamethoxazole-Trimethoprim] Nausea and Vomiting    Imitrex [Sumatriptan] Muscle Pain (Myalgia)    Oxycodone Nausea and Vomiting     TRAMADOL OK     Shellfish-Derived Products Hives    Nickel Swelling and Rash    Penicillins Rash        Social History     Tobacco Use    Smoking status: Never    Smokeless tobacco: Never   Substance Use Topics    Alcohol use: Never     Family History   Problem Relation Age of Onset    Hypothyroidism Mother     Hashimoto's thyroiditis Father     Atrial fibrillation Father 74    Cerebrovascular Disease Father 74    Transient ischemic attack Father     Multiple Sclerosis Brother     Cushing syndrome Brother     Lung Cancer Brother     Thyroid Disease Brother     Thyroid Disease Brother     Raynaud syndrome Brother     Celiac Disease Daughter     Coronary Artery Disease Maternal Uncle         heart attacks in 60s    Colon Cancer No family hx of     Diabetes No family hx of     Breast Cancer No family hx of      History   Drug Use Unknown             Review of Systems  Constitutional, HEENT, cardiovascular, pulmonary, GI, , musculoskeletal, neuro, skin, endocrine and psych systems are negative, except as otherwise noted.    Objective    /81 (BP Location: Right arm, Patient Position: Sitting, Cuff Size: Adult Regular)   Pulse 68   Resp 16   Ht 1.727 m (5' 8\")   Wt 71.2 kg (157 lb)   LMP 03/16/2025 (Approximate)   SpO2 100%   BMI 23.87 kg/m     Estimated body " "mass index is 23.87 kg/m  as calculated from the following:    Height as of this encounter: 1.727 m (5' 8\").    Weight as of this encounter: 71.2 kg (157 lb).  Physical Exam  GENERAL: alert and no distress  EYES: Eyes grossly normal to inspection  NECK: no adenopathy, no asymmetry, masses, or scars  RESP: lungs clear to auscultation - no rales, rhonchi or wheezes  CV: regular rate and rhythm, normal S1 S2, no S3 or S4, no murmur, click or rub, no peripheral edema  ABDOMEN: soft, nontender, no hepatosplenomegaly, no masses and bowel sounds normal  MS: Left foot examination reveals a surgical incision and scar anterior medially.  This there is tenderness along the path of the posterior tibial tendon and at the insertion site.  No open wound noted.  No swelling or redness.  NEURO: Normal strength and tone, mentation intact and speech normal    Recent Labs   Lab Test 02/21/25  0904 06/07/24  1052   HGB  --  12.6   PLT  --  256     --    POTASSIUM 3.8  --    CR 0.73  --         Diagnostics     No EKG required for low risk surgery (cataract, skin procedure, breast biopsy, etc).    Revised Cardiac Risk Index (RCRI)  The patient has the following serious cardiovascular risks for perioperative complications:   - No serious cardiac risks = 0 points     RCRI Interpretation: 0 points: Class I (very low risk - 0.4% complication rate)         Signed Electronically by: Ezra Hernandez MD  A copy of this evaluation report is provided to the requesting physician.         "

## 2025-04-28 ENCOUNTER — TRANSFERRED RECORDS (OUTPATIENT)
Dept: HEALTH INFORMATION MANAGEMENT | Facility: CLINIC | Age: 40
End: 2025-04-28
Payer: COMMERCIAL

## 2025-05-06 ENCOUNTER — VIRTUAL VISIT (OUTPATIENT)
Dept: FAMILY MEDICINE | Facility: CLINIC | Age: 40
End: 2025-05-06
Payer: COMMERCIAL

## 2025-05-06 DIAGNOSIS — R21 RASH: Primary | ICD-10-CM

## 2025-05-06 PROCEDURE — 98005 SYNCH AUDIO-VIDEO EST LOW 20: CPT | Performed by: STUDENT IN AN ORGANIZED HEALTH CARE EDUCATION/TRAINING PROGRAM

## 2025-05-06 RX ORDER — TRIAMCINOLONE ACETONIDE 1 MG/G
CREAM TOPICAL 2 TIMES DAILY
Qty: 30 G | Refills: 0 | Status: SHIPPED | OUTPATIENT
Start: 2025-05-06

## 2025-05-06 NOTE — PATIENT INSTRUCTIONS
Michele Whalen,    Thank you for allowing [facility] to manage your care.          I sent your prescriptions to your pharmacy.    .     For your convenience, test results are released as soon as they are available  Please allow 1-2 business days for me to send you a comment about your results.  If not done so, I encourage you to login into [employer] ([email or web address] to review your results in real time.     If you have any questions or concerns, please feel free to call us at [phone].    Sincerely,    Dr. Mckeon    Did you know?      You can schedule a video visit for follow-up appointments as well as future appointments for certain conditions.  Please see the below link.     [email or web address]    If you have not already done so,  I encourage you to sign up for [employer] ([email or web address]).  This will allow you to review your results, securely communicate with a provider, and schedule virtual visits as well.

## 2025-05-06 NOTE — PROGRESS NOTES
Marlee is a 39 year old who is being evaluated via a billable video visit.    How would you like to obtain your AVS? MyChart  If the video visit is dropped, the invitation should be resent by: Text to cell phone: [phone]  Will anyone else be joining your video visit? No      Assessment & Plan     Rash  Unstable. - triamcinolone (KENALOG) 0.1 % external cream; Apply topically 2 times daily. For 2 weeks then daily thereafter  The rash could not be clearly visualized, and the patient was unable to upload an image. I recommended a trial of topical steroid and advised the patient to continue using Lotrimin. The patient requested an oral antifungal medication; however, I explained that it is not advisable at this time, as a definitive diagnosis of a fungal skin infection has not been established and oral antifungals carry potential risks. I recommended scheduling an in-person clinic appointment for further evaluation and, if necessary, sample collection.              Subjective   Marlee is a 39 year old, presenting for the following health issues:  Derm Problem        5/6/2025    11:18 AM   Additional Questions   Roomed by Dahlia KIDD         5/6/2025    11:18 AM   Patient Reported Additional Medications   Patient reports taking the following new medications No new medications to add     History of Present Illness       Reason for visit:  Derm Problem  Symptom onset:  More than a month  Symptoms include:  Back of head, spreading  Symptom intensity:  Moderate  Symptom progression:  Worsening  Prior treatment description:  None      The rash at the back of the head his getting better with lotrimin , head and shoulder shampoo and ketoconazole but she can't get rid of it.   She now has one popped up on her finger and leg.          Review of Systems  Constitutional, HEENT, cardiovascular, pulmonary, gi and gu systems are negative, except as otherwise noted.      Objective           Vitals:  No vitals were obtained today  due to virtual visit.    Physical Exam   GENERAL: alert and no distress  EYES: Eyes grossly normal to inspection.  No discharge or erythema, or obvious scleral/conjunctival abnormalities.  RESP: No audible wheeze, cough, or visible cyanosis.    SKIN:unable to clearly visualize the rash          Video-Visit Details    Type of service:  Video Visit   Originating Location (pt. Location): Home    Distant Location (provider location):  On-site  Platform used for Video Visit: [facility]  Signed Electronically by: Venita Mckeon MD

## 2025-05-07 ENCOUNTER — RESULTS FOLLOW-UP (OUTPATIENT)
Dept: FAMILY MEDICINE | Facility: CLINIC | Age: 40
End: 2025-05-07

## 2025-05-07 ENCOUNTER — OFFICE VISIT (OUTPATIENT)
Dept: FAMILY MEDICINE | Facility: CLINIC | Age: 40
End: 2025-05-07
Payer: COMMERCIAL

## 2025-05-07 VITALS
OXYGEN SATURATION: 100 % | HEART RATE: 66 BPM | WEIGHT: 157 LBS | RESPIRATION RATE: 12 BRPM | SYSTOLIC BLOOD PRESSURE: 128 MMHG | DIASTOLIC BLOOD PRESSURE: 74 MMHG | TEMPERATURE: 97.5 F | BODY MASS INDEX: 23.79 KG/M2 | HEIGHT: 68 IN

## 2025-05-07 DIAGNOSIS — L30.1 DYSHIDROTIC ECZEMA: ICD-10-CM

## 2025-05-07 DIAGNOSIS — L30.9 DERMATITIS: Primary | ICD-10-CM

## 2025-05-07 LAB
KOH PREPARATION: NORMAL
KOH PREPARATION: NORMAL

## 2025-05-07 PROCEDURE — 1125F AMNT PAIN NOTED PAIN PRSNT: CPT | Performed by: PHYSICIAN ASSISTANT

## 2025-05-07 PROCEDURE — 87220 TISSUE EXAM FOR FUNGI: CPT | Performed by: PHYSICIAN ASSISTANT

## 2025-05-07 PROCEDURE — 3078F DIAST BP <80 MM HG: CPT | Performed by: PHYSICIAN ASSISTANT

## 2025-05-07 PROCEDURE — 99213 OFFICE O/P EST LOW 20 MIN: CPT | Performed by: PHYSICIAN ASSISTANT

## 2025-05-07 PROCEDURE — 3074F SYST BP LT 130 MM HG: CPT | Performed by: PHYSICIAN ASSISTANT

## 2025-05-07 RX ORDER — FLUOCINOLONE ACETONIDE 0.1 MG/ML
SOLUTION TOPICAL 2 TIMES DAILY
Qty: 60 ML | Refills: 0 | Status: SHIPPED | OUTPATIENT
Start: 2025-05-07

## 2025-05-07 ASSESSMENT — PAIN SCALES - GENERAL: PAINLEVEL_OUTOF10: MILD PAIN (3)

## 2025-05-07 NOTE — PROGRESS NOTES
Assessment & Plan     Dermatitis  DDx includes lichen simplex chronicus, eczema, seborrheic dermatitis  - KOH prep (skin, hair or nails only)--negative for fungal elements.  -Continue with nizoral shampoo.  Can stop the lortrimin cream.  - fluocinolone (SYNALAR) 0.01 % solution; Apply topically 2 times daily. Use for 2-4 weeks. Until symptoms are gone for at least 2 full days.    Dyshidrotic eczema  Etiology discussed. Can possibly recurrence.    Triamcinolone 1%  Previously prescribed. Applied twice daily for the next 2 weeks      Anita Whlaen is a 39 year old, presenting for the following health issues:  Derm Problem      Via the Health Maintenance questionnaire, the patient has reported the following services have been completed -Mammogram: [facility] 2024-03-10, this information has not been sent to the abstraction team.    History of Present Illness       Reason for visit:  Pssible fungal rash on head and foot    She eats 2-3 servings of fruits and vegetables daily.She consumes 0 sweetened beverage(s) daily.She exercises with enough effort to increase her heart rate 10 to 19 minutes per day.  She exercises with enough effort to increase her heart rate 4 days per week.   She is taking medications regularly.      CHIEF COMPLAINT:  Rash for the past 2 months on the back of her head.  Very itchy.  Has not had this before. Waxes and wanes.  Lotrimin and ketoconazole xhampoo. For the past 1-2 weeks.  Tried HCN cream but only for a day or two.  At times the area has been scaly.    Second rash:  Located on the sole of the left foot and the lateral right index finger. Both areas started with pinpont clear vesicles and are intensely itchy.  She has had this in the past about a ear ago and it resolved on its own.    Patient reports she has been under a great deal of stress.  Had a recent foot surgery.      Rash  Onset/Duration: since beginning of March 2025  Description  Location: back of head at hair  "line, right pointer finger, bottom of right foot  Character: round, raised, red  Itching: severe  Intensity:  moderate  Progression of Symptoms:  worsening  Accompanying signs and symptoms:   Fever: No  Body aches or joint pain: No  Sore throat symptoms: No  Recent cold symptoms: No  History:           Previous episodes of similar rash: yes-was seen virtually yesterday  New exposures:  None  Recent travel: No  Exposure to similar rash: No  Precipitating or alleviating factors: scratching it makes it worse and when stops lotrimin it worsens. Lotrimin helps with itching   Therapies tried and outcome: lotrimin, head and shoulder shampoo, ketoconazole, hydrocortisone cream 1 %          Objective    /74   Pulse 66   Temp 97.5  F (36.4  C) (Temporal)   Resp 12   Ht 1.727 m (5' 7.99\")   Wt 71.2 kg (157 lb)   LMP 04/07/2025 (Approximate)   SpO2 100%   Breastfeeding No   BMI 23.88 kg/m    Body mass index is 23.88 kg/m .  Physical Exam   GENERAL: alert and no distress  NECK: no adenopathy, no asymmetry, masses, or scars  RESP: lungs clear to auscultation - no rales, rhonchi or wheezes  CV: regular rate and rhythm, normal S1 S2, no S3 or S4, no murmur, click or rub, no peripheral edema  SKIN:   Patch of inflammation with mild scaling and slight skin thickening. No swelling or pain.  Small patch of pinpoint clear vesicles on the right lateral index finger. No swelling or sign of secondary infection. Also small patch of the sole of the left foot.      See photos in media.  Signed Electronically by: Kierra Torres PA-C    "

## 2025-05-07 NOTE — RESULT ENCOUNTER NOTE
Michele Whalen,  No fungal elements were seen.  I would continue with the ketoconazole shampoo but we do not need the anti-fungal cream.Please let me know if you have any questions.  Micaela Torres PA-C

## 2025-05-07 NOTE — PATIENT INSTRUCTIONS
Use the solution on your scalp twice daily for 2-4 weeks.    Continue with ketoconazole shampoo--pending the skin scraping results.    Antihistamine for the itch.    Triamcinolone to the finger and foot twice daily for 2-4 weeks.    This is likely to recur and if it does restart treatment at the first sign.

## 2025-05-18 ENCOUNTER — HEALTH MAINTENANCE LETTER (OUTPATIENT)
Age: 40
End: 2025-05-18

## 2025-05-30 ENCOUNTER — MYC MEDICAL ADVICE (OUTPATIENT)
Dept: FAMILY MEDICINE | Facility: CLINIC | Age: 40
End: 2025-05-30

## 2025-05-30 PROBLEM — K35.30 ACUTE APPENDICITIS WITH LOCALIZED PERITONITIS, WITHOUT PERFORATION, ABSCESS, OR GANGRENE: Status: ACTIVE | Noted: 2025-05-30

## 2025-05-30 PROBLEM — K37 APPENDICITIS: Status: ACTIVE | Noted: 2025-05-28

## 2025-05-30 PROBLEM — K86.9 PANCREATIC LESION: Status: ACTIVE | Noted: 2025-05-30

## 2025-06-02 ENCOUNTER — ANCILLARY PROCEDURE (OUTPATIENT)
Dept: GENERAL RADIOLOGY | Facility: CLINIC | Age: 40
End: 2025-06-02
Attending: INTERNAL MEDICINE
Payer: COMMERCIAL

## 2025-06-02 ENCOUNTER — RESULTS FOLLOW-UP (OUTPATIENT)
Dept: INTERNAL MEDICINE | Facility: CLINIC | Age: 40
End: 2025-06-02

## 2025-06-02 DIAGNOSIS — R05.1 ACUTE COUGH: ICD-10-CM

## 2025-06-02 PROCEDURE — 71046 X-RAY EXAM CHEST 2 VIEWS: CPT | Mod: GC | Performed by: RADIOLOGY

## 2025-06-06 ENCOUNTER — OFFICE VISIT (OUTPATIENT)
Dept: FAMILY MEDICINE | Facility: CLINIC | Age: 40
End: 2025-06-06
Payer: COMMERCIAL

## 2025-06-06 VITALS
HEART RATE: 75 BPM | WEIGHT: 154.3 LBS | HEIGHT: 68 IN | TEMPERATURE: 97.8 F | DIASTOLIC BLOOD PRESSURE: 82 MMHG | SYSTOLIC BLOOD PRESSURE: 131 MMHG | BODY MASS INDEX: 23.39 KG/M2 | RESPIRATION RATE: 18 BRPM | OXYGEN SATURATION: 100 %

## 2025-06-06 DIAGNOSIS — G89.18 POSTOPERATIVE PAIN: ICD-10-CM

## 2025-06-06 DIAGNOSIS — K86.89 PANCREATIC MASS: ICD-10-CM

## 2025-06-06 DIAGNOSIS — D3A.8 NEUROENDOCRINE NEOPLASM OF APPENDIX (H): ICD-10-CM

## 2025-06-06 DIAGNOSIS — H01.009 ACUTE BLEPHARITIS: ICD-10-CM

## 2025-06-06 DIAGNOSIS — Z12.31 VISIT FOR SCREENING MAMMOGRAM: ICD-10-CM

## 2025-06-06 DIAGNOSIS — Z90.49 S/P LAPAROSCOPIC APPENDECTOMY: Primary | ICD-10-CM

## 2025-06-06 DIAGNOSIS — R10.32 ABDOMINAL PAIN, LEFT LOWER QUADRANT: ICD-10-CM

## 2025-06-06 LAB
ALBUMIN UR-MCNC: NEGATIVE MG/DL
APPEARANCE UR: CLEAR
BACTERIA #/AREA URNS HPF: ABNORMAL /HPF
BASOPHILS # BLD AUTO: 0.1 10E3/UL (ref 0–0.2)
BASOPHILS NFR BLD AUTO: 1 %
BILIRUB UR QL STRIP: NEGATIVE
COLOR UR AUTO: YELLOW
EOSINOPHIL # BLD AUTO: 0.2 10E3/UL (ref 0–0.7)
EOSINOPHIL NFR BLD AUTO: 2 %
ERYTHROCYTE [DISTWIDTH] IN BLOOD BY AUTOMATED COUNT: 12.1 % (ref 10–15)
GLUCOSE UR STRIP-MCNC: NEGATIVE MG/DL
HCT VFR BLD AUTO: 38.9 % (ref 35–47)
HGB BLD-MCNC: 13.2 G/DL (ref 11.7–15.7)
HGB UR QL STRIP: ABNORMAL
IMM GRANULOCYTES # BLD: 0 10E3/UL
IMM GRANULOCYTES NFR BLD: 0 %
KETONES UR STRIP-MCNC: NEGATIVE MG/DL
LEUKOCYTE ESTERASE UR QL STRIP: NEGATIVE
LYMPHOCYTES # BLD AUTO: 3.3 10E3/UL (ref 0.8–5.3)
LYMPHOCYTES NFR BLD AUTO: 40 %
MCH RBC QN AUTO: 30.1 PG (ref 26.5–33)
MCHC RBC AUTO-ENTMCNC: 33.9 G/DL (ref 31.5–36.5)
MCV RBC AUTO: 89 FL (ref 78–100)
MONOCYTES # BLD AUTO: 0.6 10E3/UL (ref 0–1.3)
MONOCYTES NFR BLD AUTO: 7 %
MUCOUS THREADS #/AREA URNS LPF: PRESENT /LPF
NEUTROPHILS # BLD AUTO: 4.2 10E3/UL (ref 1.6–8.3)
NEUTROPHILS NFR BLD AUTO: 50 %
NITRATE UR QL: NEGATIVE
PH UR STRIP: 6 [PH] (ref 5–7)
PLATELET # BLD AUTO: 339 10E3/UL (ref 150–450)
RBC # BLD AUTO: 4.39 10E6/UL (ref 3.8–5.2)
RBC #/AREA URNS AUTO: ABNORMAL /HPF
SP GR UR STRIP: 1.02 (ref 1–1.03)
SQUAMOUS #/AREA URNS AUTO: ABNORMAL /LPF
UROBILINOGEN UR STRIP-ACNC: 0.2 E.U./DL
WBC # BLD AUTO: 8.4 10E3/UL (ref 4–11)
WBC #/AREA URNS AUTO: ABNORMAL /HPF

## 2025-06-06 PROCEDURE — 90471 IMMUNIZATION ADMIN: CPT | Performed by: PHYSICIAN ASSISTANT

## 2025-06-06 PROCEDURE — 3079F DIAST BP 80-89 MM HG: CPT | Performed by: PHYSICIAN ASSISTANT

## 2025-06-06 PROCEDURE — 36415 COLL VENOUS BLD VENIPUNCTURE: CPT | Performed by: PHYSICIAN ASSISTANT

## 2025-06-06 PROCEDURE — 90715 TDAP VACCINE 7 YRS/> IM: CPT | Performed by: PHYSICIAN ASSISTANT

## 2025-06-06 PROCEDURE — 3075F SYST BP GE 130 - 139MM HG: CPT | Performed by: PHYSICIAN ASSISTANT

## 2025-06-06 PROCEDURE — 1125F AMNT PAIN NOTED PAIN PRSNT: CPT | Performed by: PHYSICIAN ASSISTANT

## 2025-06-06 PROCEDURE — 99214 OFFICE O/P EST MOD 30 MIN: CPT | Mod: 25 | Performed by: PHYSICIAN ASSISTANT

## 2025-06-06 PROCEDURE — 81001 URINALYSIS AUTO W/SCOPE: CPT | Performed by: PHYSICIAN ASSISTANT

## 2025-06-06 PROCEDURE — 85025 COMPLETE CBC W/AUTO DIFF WBC: CPT | Performed by: PHYSICIAN ASSISTANT

## 2025-06-06 PROCEDURE — 80076 HEPATIC FUNCTION PANEL: CPT | Performed by: PHYSICIAN ASSISTANT

## 2025-06-06 PROCEDURE — G2211 COMPLEX E/M VISIT ADD ON: HCPCS | Performed by: PHYSICIAN ASSISTANT

## 2025-06-06 RX ORDER — HYDROCODONE BITARTRATE AND ACETAMINOPHEN 5; 325 MG/1; MG/1
1 TABLET ORAL
Qty: 10 TABLET | Refills: 0 | Status: CANCELLED | OUTPATIENT
Start: 2025-06-06 | End: 2025-06-16

## 2025-06-06 RX ORDER — HYDROMORPHONE HYDROCHLORIDE 2 MG/1
2 TABLET ORAL
Qty: 10 TABLET | Refills: 0 | Status: SHIPPED | OUTPATIENT
Start: 2025-06-06 | End: 2025-06-16

## 2025-06-06 RX ORDER — ERYTHROMYCIN 5 MG/G
0.5 OINTMENT OPHTHALMIC 4 TIMES DAILY
Qty: 3.5 G | Refills: 0 | Status: SHIPPED | OUTPATIENT
Start: 2025-06-06 | End: 2025-06-13

## 2025-06-06 ASSESSMENT — PAIN SCALES - GENERAL: PAINLEVEL_OUTOF10: MILD PAIN (3)

## 2025-06-06 NOTE — NURSING NOTE
Prior to immunization administration, verified patients identity using patient s name and date of birth. Please see Immunization Activity for additional information. yes    Screening Questionnaire for Adult Immunization    Are you sick today?   Yes   Do you have allergies to medications, food, a vaccine component or latex?   No   Have you ever had a serious reaction after receiving a vaccination?   No   Do you have a long-term health problem with heart, lung, kidney, or metabolic disease (e.g., diabetes), asthma, a blood disorder, no spleen, complement component deficiency, a cochlear implant, or a spinal fluid leak?  Are you on long-term aspirin therapy?   No   Do you have cancer, leukemia, HIV/AIDS, or any other immune system problem?   Yes   Do you have a parent, brother, or sister with an immune system problem?   Yes   In the past 3 months, have you taken medications that affect  your immune system, such as prednisone, other steroids, or anticancer drugs; drugs for the treatment of rheumatoid arthritis, Crohn s disease, or psoriasis; or have you had radiation treatments?   No   Have you had a seizure, or a brain or other nervous system problem?   No   During the past year, have you received a transfusion of blood or blood    products, or been given immune (gamma) globulin or antiviral drug?   No   For women: Are you pregnant or is there a chance you could become       pregnant during the next month?   No   Have you received any vaccinations in the past 4 weeks?   No     Immunization questionnaire was positive for at least one answer.  Jaja Salas.      Patient instructed to remain in clinic for 15 minutes afterwards, and to report any adverse reactions.     Screening performed by Diane L. Schoenherr, RN on 6/6/2025 at 3:56 PM.

## 2025-06-06 NOTE — PATIENT INSTRUCTIONS
Follow up with oncology surgeon.  Return urgently if any change in symptoms.    Schedule MRI abdomen by calling [facility] ([phone])  Use erythromycin eye ointment 1/2 inch ribbon four times a day for 7 days to right upper lid  Also use warm pack 15-20 minutes at a time several times a day  Follow up with ophthalmology if not improved over the weekend.  Return urgently if any change in symptoms like increasing pain, fever, vomiting or other change in symptoms.   Take dilaudid at bedtime as needed for pain.

## 2025-06-06 NOTE — PROGRESS NOTES
Assessment & Plan     S/P laparoscopic appendectomy  Surgery last week- more complicated than typical laparoscopic appendectomy - to follow up with surgery oncology per [facility] surgeon     Postoperative pain  Pain at night waking from sleep-benign exam except tenderness.  [facility] reviewed and last filled dilaudid six tablets 4/21/25  Has not tolerated vicodin or percocet in past - dilaudid after foot surgery  Follow up with me Monday if pain not improving and I will contact surgeon- surgeon note states to follow up with oncology surgery through [facility]- procedure performed at [facility]     - HYDROmorphone (DILAUDID) 2 MG tablet  Dispense: 10 tablet; Refill: 0    Neuroendocrine neoplasm of appendix (H)  Needs follow up with oncology surgery     - Adult Oncology/Hematology  Referral    Abdominal pain, left lower quadrant  Normal CBC , normal hepatic panel.  Urine dipstick showed blood but no rbc under microscope  So I don't think she has bladder infection, kidney stone or postop infection.    - CBC with platelets and differential  - UA Macroscopic with reflex to Microscopic and Culture - Lab Collect  - Hepatic panel (Albumin, ALT, AST, Bili, Alk Phos, TP)  - CBC with platelets and differential  - UA Macroscopic with reflex to Microscopic and Culture - Lab Collect  - Hepatic panel (Albumin, ALT, AST, Bili, Alk Phos, TP)  - UA Microscopic with Reflex to Culture  - HYDROmorphone (DILAUDID) 2 MG tablet  Dispense: 10 tablet; Refill: 0    Pancreatic mass  MRI abdomen to further evaluate and follow up with oncology surgeon   - Adult Oncology/Hematology  Referral  - MR Abdomen w/o & w Contrast    Acute blepharitis  Will treat with erythromycin ointment - follow up with eye doctor if no improvement by Monday   - erythromycin (ROMYCIN) 5 MG/GM ophthalmic ointment  Dispense: 3.5 g; Refill: 0    Visit for screening mammogram  Encouraged to schedule  - MA Screening Bilateral w/ [facility]    Patient Instructions   Follow  up with oncology surgeon.  Return urgently if any change in symptoms.    Schedule MRI abdomen by calling [facility] ([phone])  Use erythromycin eye ointment 1/2 inch ribbon four times a day for 7 days to right upper lid  Also use warm pack 15-20 minutes at a time several times a day  Follow up with ophthalmology if not improved over the weekend.  Return urgently if any change in symptoms like increasing pain, fever, vomiting or other change in symptoms.   Take dilaudid at bedtime as needed for pain.     The longitudinal plan of care for the diagnosis(es)/condition(s) as documented were addressed during this visit. Due to the added complexity in care, I will continue to support Marlee in the subsequent management and with ongoing continuity of care.      MED REC REQUIRED  Post Medication Reconciliation Status:  Discharge medications reconciled and changed, see notes/orders    Follow-up    Follow-up Visit   Expected date:  Sep 06, 2025 (Approximate)      Follow Up Appointment Details:     Follow-up with whom?: Me    Follow-Up for what?: Adult Preventive    How?: In Person    Is this an as-needed follow-up?: No                 Subjective   Marlee is a 39 year old, presenting for the following health issues:  Surgical Followup ([facility] 5/28/2025)      6/6/2025     2:49 PM   Additional Questions   Roomed by Noemi CANNON   Accompanied by Self     Via the Health Maintenance questionnaire, the patient has reported the following services have been completed -Mammogram: [facility] 2024-02-23, this information has not been sent to the abstraction team.  [facility]          Hospital Follow-up Visit:    Hospital/Nursing Home/IP Rehab Facility: [facility]   Date of Admission: 5/27/2025  Date of Discharge: 5/28/2025  Reason(s) for Admission: Acute Appendectomy   Do you have any other stressors you would like to discuss with your provider? No    Problems taking medications regularly:  None  Medication changes since  "discharge: None  Problems adhering to non-medication therapy:  None    Summary of hospitalization:  CareEverywhere information obtained and reviewed  Diagnostic Tests/Treatments reviewed.  Follow up needed: MRI for pancreatic mass, oncology surgery for neuroendocrine tumor   Other Healthcare Providers Involved in Patient s Care:         None  Update since discharge: fluctuating course.  Additional issues: right upper eyelid pain, swelling and redness          Plan of care communicated with patient           Patient known to me with history of postsurgical hypothyroidism, Sjogren's syndrome,and lupus presents for hospital follow up with me  Had emergency appendectomy due to acute appendicitis with localized peritonitis without perforation, abscess or gangrene -pathology showed neuroendocrine neoplasm of appendix and also noted pancreatic mass needing additional imaging with MRI  Surgery a week ago-continues to have pain especially with standing and waking her up during the middle of the night.  Is nauseated without emesis.  No diarrhea but every time she eats something seems to go through.   Pain not relieved with ibuprofen. No fever, sweats, chills.   No urinary tract infection symptoms -  pain left lower quadrant around one of laparoscopy incisions and she feels like there is a lump there.  Pain radiates around to the left flank  No history of renal stone.  Feels like nausea is related to pain only  And wouldn't be nauseated if no pain.   Reports was informed by surgeon that really had to work hard to remove appendix           Review of Systems  Constitutional, HEENT, cardiovascular, pulmonary, gi and gu systems are negative, except as otherwise noted.      Objective    /82 (BP Location: Right arm, Patient Position: Sitting, Cuff Size: Adult Regular)   Pulse 75   Temp 97.8  F (36.6  C) (Oral)   Resp 18   Ht 1.735 m (5' 8.31\")   Wt 70 kg (154 lb 4.8 oz)   LMP 05/29/2025 (Approximate)   SpO2 100%   BMI " 23.25 kg/m    Body mass index is 23.25 kg/m .  Physical Exam   GENERAL: alert and no distress  Perrrl eomi - right eye upper eyelid with erythema and edema- no drainage -conjunctiva normal   NECK: no adenopathy, no asymmetry, masses, or scars  RESP: lungs clear to auscultation - no rales, rhonchi or wheezes  CV: regular rate and rhythm, normal S1 S2, no S3 or S4, no murmur, click or rub, no peripheral edema  ABDOMEN: tenderness LLQ and above left lower quadrant incision sites , no organomegaly or masses, liver span normal to percussion, bowel sounds normal, and well-healed incision - larger laparoscopic incisions than typically noted. No erythema, edema or fluctuance of incision sites.   MS: no gross musculoskeletal defects noted, no edema  PSYCH: mentation appears normal, affect normal/bright    Results for orders placed or performed in visit on 06/06/25   UA Macroscopic with reflex to Microscopic and Culture - Lab Collect     Status: Abnormal    Specimen: Urine, Clean Catch   Result Value Ref Range    Color Urine Yellow Colorless, Straw, Light Yellow, Yellow    Appearance Urine Clear Clear    Glucose Urine Negative Negative mg/dL    Bilirubin Urine Negative Negative    Ketones Urine Negative Negative mg/dL    Specific Gravity Urine 1.025 1.003 - 1.035    Blood Urine Trace (A) Negative    pH Urine 6.0 5.0 - 7.0    Protein Albumin Urine Negative Negative mg/dL    Urobilinogen Urine 0.2 0.2, 1.0 E.U./dL    Nitrite Urine Negative Negative    Leukocyte Esterase Urine Negative Negative   Hepatic panel (Albumin, ALT, AST, Bili, Alk Phos, TP)     Status: Normal   Result Value Ref Range    Protein Total 6.9 6.4 - 8.3 g/dL    Albumin 4.5 3.5 - 5.2 g/dL    Bilirubin Total 0.2 <=1.2 mg/dL    Alkaline Phosphatase 55 40 - 150 U/L    AST 28 0 - 45 U/L    ALT 26 0 - 50 U/L    Bilirubin Direct 0.11 0.00 - 0.45 mg/dL   CBC with platelets and differential     Status: None   Result Value Ref Range    WBC Count 8.4 4.0 - 11.0 10e3/uL     RBC Count 4.39 3.80 - 5.20 10e6/uL    Hemoglobin 13.2 11.7 - 15.7 g/dL    Hematocrit 38.9 35.0 - 47.0 %    MCV 89 78 - 100 fL    MCH 30.1 26.5 - 33.0 pg    MCHC 33.9 31.5 - 36.5 g/dL    RDW 12.1 10.0 - 15.0 %    Platelet Count 339 150 - 450 10e3/uL    % Neutrophils 50 %    % Lymphocytes 40 %    % Monocytes 7 %    % Eosinophils 2 %    % Basophils 1 %    % Immature Granulocytes 0 %    Absolute Neutrophils 4.2 1.6 - 8.3 10e3/uL    Absolute Lymphocytes 3.3 0.8 - 5.3 10e3/uL    Absolute Monocytes 0.6 0.0 - 1.3 10e3/uL    Absolute Eosinophils 0.2 0.0 - 0.7 10e3/uL    Absolute Basophils 0.1 0.0 - 0.2 10e3/uL    Absolute Immature Granulocytes 0.0 <=0.4 10e3/uL   UA Microscopic with Reflex to Culture     Status: Abnormal   Result Value Ref Range    Bacteria Urine Few (A) None Seen /HPF    RBC Urine 0-2 0-2 /HPF /HPF    WBC Urine 0-5 0-5 /HPF /HPF    Squamous Epithelials Urine Few (A) None Seen /LPF    Mucus Urine Present (A) None Seen /LPF    Narrative    Urine Culture not indicated   CBC with platelets and differential     Status: None    Narrative    The following orders were created for panel order CBC with platelets and differential.  Procedure                               Abnormality         Status                     ---------                               -----------         ------                     CBC with platelets and ...[[id number]]                      Final result                 Please view results for these tests on the individual orders.           Signed Electronically by: Lyudmila Salas PA-C

## 2025-06-07 ENCOUNTER — RESULTS FOLLOW-UP (OUTPATIENT)
Dept: FAMILY MEDICINE | Facility: CLINIC | Age: 40
End: 2025-06-07

## 2025-06-07 PROBLEM — Z90.49 S/P LAPAROSCOPIC APPENDECTOMY: Status: ACTIVE | Noted: 2025-06-07

## 2025-06-07 PROBLEM — K86.89 PANCREATIC MASS: Status: ACTIVE | Noted: 2025-06-07

## 2025-06-07 PROBLEM — D3A.8: Status: ACTIVE | Noted: 2025-06-07

## 2025-06-07 LAB
ALBUMIN SERPL BCG-MCNC: 4.5 G/DL (ref 3.5–5.2)
ALP SERPL-CCNC: 55 U/L (ref 40–150)
ALT SERPL W P-5'-P-CCNC: 26 U/L (ref 0–50)
AST SERPL W P-5'-P-CCNC: 28 U/L (ref 0–45)
BILIRUB SERPL-MCNC: 0.2 MG/DL
BILIRUBIN DIRECT (ROCHE PRO & PURE): 0.11 MG/DL (ref 0–0.45)
PROT SERPL-MCNC: 6.9 G/DL (ref 6.4–8.3)

## 2025-06-10 ENCOUNTER — PATIENT OUTREACH (OUTPATIENT)
Dept: ONCOLOGY | Facility: CLINIC | Age: 40
End: 2025-06-10
Payer: COMMERCIAL

## 2025-06-10 ENCOUNTER — TELEPHONE (OUTPATIENT)
Dept: FAMILY MEDICINE | Facility: CLINIC | Age: 40
End: 2025-06-10
Payer: COMMERCIAL

## 2025-06-10 DIAGNOSIS — D3A.8 NEUROENDOCRINE NEOPLASM OF APPENDIX (H): Primary | ICD-10-CM

## 2025-06-10 DIAGNOSIS — K86.89 PANCREATIC MASS: ICD-10-CM

## 2025-06-10 NOTE — PROGRESS NOTES
Patient notified per [place]:    Please call patient and advise I heard back from oncology nurse navigator and doesn't need oncology surgery but needs oncology and gastroenterologist follow up - see orders placed and give out appropriate phone numbers from orders      Phone numbers sent via [employer] as requested by patient. Patient verbalized understanding and agrees with plan. Advised to call with questions or concerns.    Kitty Lewis, RN, BSN, PHN

## 2025-06-10 NOTE — PROGRESS NOTES
Please call patient and advise I heard back from oncology nurse navigator and doesn't need oncology surgery but needs oncology and gastroenterologist follow up - see orders placed and give out appropriate phone numbers from orders

## 2025-06-11 ENCOUNTER — DOCUMENTATION ONLY (OUTPATIENT)
Dept: GASTROENTEROLOGY | Facility: CLINIC | Age: 40
End: 2025-06-11
Payer: COMMERCIAL

## 2025-06-11 ENCOUNTER — TELEPHONE (OUTPATIENT)
Dept: GASTROENTEROLOGY | Facility: CLINIC | Age: 40
End: 2025-06-11
Payer: COMMERCIAL

## 2025-06-11 DIAGNOSIS — K86.9 PANCREATIC LESION: Primary | ICD-10-CM

## 2025-06-11 NOTE — PROGRESS NOTES
Records Requested  06/11/25    Facility  [facility]   Fax: [phone]   Outcome Spoke with Film room at [facility], they will be sending the 5/25/2025 CT to PACS    6/11 Imae has been resolved in PACS.

## 2025-06-11 NOTE — TELEPHONE ENCOUNTER
Advanced Endoscopy     Referring provider:   Lyudmila Salas PA-C   [address]   [place] [address]   Phone: [phone]   Fax: [phone]       Referred to: [facility]     Provider Requested: VEDA     Referral Received: 6/10/25     Records received: EPIC     Images received: CT in PACS 6/11/25; awaiting completion of MRI scheduled for 6/16/25 -- Imaging in PACS    Insurance Coverage: [employer]    Evaluation for:   Diagnosis   K86.89 (ICD-10-CM) - Pancreatic mass      Clinical History (per RN review):   EXAM: MR ABDOMEN MRCP W/O and W CONTRAST  LOCATION: [facility]  DATE: 6/16/2025  INDICATION:  Pancreatic mass                                                               IMPRESSION:   1.  A 6 mm cystic lesion in the pancreatic body without worrisome features. See follow-up guidelines below.  2.  Calcified left abdominal mass present since 2016 and although nonspecific is likely benign. No abdominal lymphadenopathy.    CT Abdomen Pelvis w Contrast 5/28/25  Order: [id number]  Impression    1. Fluid dilation of the appendix measuring up to 1.9 cm at the tip with mild associated inflammatory changes. Findings most suggestive of mucocele with possible superimposed acute appendicitis.  2. Left upper quadrant calcified lesion measuring up to 2.3 cm, which is of indeterminate etiology and significance.  3. 6 mm well-circumscribed hypodense pancreatic body lesion is indeterminate, likely cystic, but could be evaluated in greater detail with outpatient multiphase MR, if clinically indicated.     Provider review date: 6/23/25 Dr. Doe   Provider Decision for clinic consultation/Orders:    Please schedule EUS in UPU under MAC         Referral updates/Patient contacted: 6/23/25

## 2025-06-12 NOTE — TELEPHONE ENCOUNTER
RECORDS STATUS - ALL OTHER DIAGNOSIS      RECORDS RECEIVED FROM: [facility]   NOTES STATUS DETAILS   OFFICE NOTE from referring provider Epic 6/6/25: Lyudmila Salas PA-C   DISCHARGE REPORT from the ER CE- [place] 5/27/25: [facility] ED   OPERATIVE REPORT - [facility] 5/28/25: APPENDECTOMY    MEDICATION LIST [facility]    LABS     PATHOLOGY REPORTS Report in - [facility]  (Slides Requested) 5/28/25: [id number]    ANYTHING RELATED TO DIAGNOSIS Epic Most recent 6/6/25   PATHOLOGY FEDEX TRACKING   Tracking #: [id number]   IMAGING (NEED IMAGES & REPORT)     CT SCANS PACS 5/28/25: CT AP   MRI PACS 6/16/25: MR Abd (scheduled)

## 2025-06-13 NOTE — PROGRESS NOTES
Oncology referral received from Lyudmila Salas PA-C/ PCP, for GI carcinoid.    (See my bookmarks for pertinent records in Epic)      HX 5/28/2025 CT AP, ED at [facility], laparoscopic appendectomy, path confirms appendiceal NET pT1 pN? Pt also with pancreatic cysts, per KJ/ Surg Onc, pt needs EUS/GI team first. Needs to establish with Onc for NET. We will offer next available Onc per pt preference.    Billy Jackson RN  Oncology Nurse Navigator  [facility] [place] [facility]  [phone]

## 2025-06-16 ENCOUNTER — ANCILLARY PROCEDURE (OUTPATIENT)
Dept: MRI IMAGING | Facility: CLINIC | Age: 40
End: 2025-06-16
Attending: PHYSICIAN ASSISTANT
Payer: COMMERCIAL

## 2025-06-16 DIAGNOSIS — K86.89 PANCREATIC MASS: ICD-10-CM

## 2025-06-16 PROCEDURE — A9585 GADOBUTROL INJECTION: HCPCS | Performed by: RADIOLOGY

## 2025-06-16 PROCEDURE — 74183 MRI ABD W/O CNTR FLWD CNTR: CPT | Mod: TC | Performed by: RADIOLOGY

## 2025-06-16 RX ORDER — GADOBUTROL 604.72 MG/ML
0.1 INJECTION INTRAVENOUS ONCE
Status: COMPLETED | OUTPATIENT
Start: 2025-06-16 | End: 2025-06-16

## 2025-06-16 RX ADMIN — GADOBUTROL 7 ML: 604.72 INJECTION INTRAVENOUS at 19:05

## 2025-06-17 ENCOUNTER — LAB REQUISITION (OUTPATIENT)
Dept: LAB | Facility: CLINIC | Age: 40
End: 2025-06-17
Payer: COMMERCIAL

## 2025-06-17 PROCEDURE — 88321 CONSLTJ&REPRT SLD PREP ELSWR: CPT | Mod: GC | Performed by: STUDENT IN AN ORGANIZED HEALTH CARE EDUCATION/TRAINING PROGRAM

## 2025-06-19 LAB
PATH REPORT.COMMENTS IMP SPEC: ABNORMAL
PATH REPORT.COMMENTS IMP SPEC: YES
PATH REPORT.FINAL DX SPEC: ABNORMAL
PATH REPORT.GROSS SPEC: ABNORMAL
PATH REPORT.MICROSCOPIC SPEC OTHER STN: ABNORMAL
PATH REPORT.RELEVANT HX SPEC: ABNORMAL
PATH REPORT.RELEVANT HX SPEC: ABNORMAL
PATH REPORT.SITE OF ORIGIN SPEC: ABNORMAL

## 2025-06-23 NOTE — TELEPHONE ENCOUNTER
Following review of referral, per Dr. Doe:  Please schedule EUS in [facility] under MAC     Called patient to discuss procedure and indication. Discussed that procedure would be done at [facility] in [place] and that patient would require a  that day.     Patient agreeable to proceed with recommendations. Gastro procedure order placed. Patient advised that they should hear from endoscopy scheduling team in the coming days or they may call [phone] and select option 1 to speak to a .    Provided with clinic contact information for further questions or concerns.     Giselle Colby RN Care Coordinator

## 2025-06-24 ENCOUNTER — TELEPHONE (OUTPATIENT)
Dept: GASTROENTEROLOGY | Facility: CLINIC | Age: 40
End: 2025-06-24
Payer: COMMERCIAL

## 2025-06-24 NOTE — TELEPHONE ENCOUNTER
"Endoscopy Scheduling Screen    Caller: patient    Have you had any respiratory illness or flu-like symptoms in the last 10 days?  No    Patient is ACTIVE on [employer].  Inform patient that all appointment instructions will be sent via [employer].    Review patient's insurance for any non participating payor.    Ordering/Referring Provider: Guru Drake Doe MD   (If ordering provider performs procedure, schedule with ordering provider unless otherwise instructed. )    BMI: Estimated body mass index is 23.25 kg/m  as calculated from the following:    Height as of 6/6/25: 1.735 m (5' 8.31\").    Weight as of 6/6/25: 70 kg (154 lb 4.8 oz).     Sedation Ordered  MAC/deep sedation.   BMI<= 45 45 < BMI <= 48 48 < BMI < = 50  BMI > 50   No Restrictions No [facility]  No [facility]  [facility] with exceptions Hospital Only OR Only       Do you have a history of malignant hyperthermia?  No    (Females) Are you currently pregnant?   No     Have you been diagnosed or told you have pulmonary hypertension?   No    Do you have an LVAD?  No    Have you been told you have moderate to severe sleep apnea?  No.    Have you been told you have COPD, asthma, or any other lung disease?  No    Has your doctor ordered any cardiac tests like echo, angiogram, stress test, ablation, or EKG, that you have not completed yet?  No    Do you  have a history of any heart conditions?  No     Have you ever had or are you waiting for an organ transplant?  No. Continue scheduling, no site restrictions.    Have you had a stroke or transient ischemic attack (TIA aka \"mini stroke\") in the last 2 years?   No.    Have you been diagnosed with or been told you have cirrhosis of the liver?   No.    Are you currently on dialysis?   No    Do you need assistance transferring?   No    BMI: Estimated body mass index is 23.25 kg/m  as calculated from the following:    Height as of 6/6/25: 1.735 m (5' 8.31\").    Weight as of 6/6/25: 70 kg (154 lb 4.8 oz).     Is " patients BMI > 40 and scheduling location [facility]?  No    Do you take an injectable or oral medication for weight loss or diabetes (excluding insulin)?  No    Do you take the medication Naltrexone?  No    Do you take blood thinners?  No       Prep   Are you currently on dialysis or do you have chronic kidney disease?  No    Do you have a diagnosis of diabetes?  No    Do you have a diagnosis of cystic fibrosis (CF)?  No    On a regular basis do you go 3 -5 days between bowel movements?  No    BMI > 40?  No    Preferred Pharmacy:    [id number] [place] [facility] [id number] [facility]  [address] [facility] [address]  Phone: [phone] Fax: [phone]    Final Scheduling Details     Procedure scheduled  Endoscopic ultrasound (EUS)    Surgeon:       Date of procedure:  7.15.25     Pre-OP / PAC:   No - Not required for this site.    Location  [facility] - Per order.    Sedation   MAC/Deep Sedation - Per order.      Patient Reminders:   You will receive a call from a Nurse to review instructions and health history.  This assessment must be completed prior to your procedure.  Failure to complete the Nurse assessment may result in the procedure being cancelled.      On the day of your procedure, please designate an adult(s) who can drive you home stay with you for the next 24 hours. The medicines used in the exam will make you sleepy. You will not be able to drive.      You cannot take public transportation, ride share services, or non-medical taxi service without a responsible caregiver.  Medical transport services are allowed with the requirement that a responsible caregiver will receive you at your destination.  We require that drivers and caregivers are confirmed prior to your procedure.

## 2025-06-25 ENCOUNTER — TELEPHONE (OUTPATIENT)
Dept: GASTROENTEROLOGY | Facility: CLINIC | Age: 40
End: 2025-06-25
Payer: COMMERCIAL

## 2025-06-25 ENCOUNTER — PRE VISIT (OUTPATIENT)
Dept: ONCOLOGY | Facility: CLINIC | Age: 40
End: 2025-06-25
Payer: COMMERCIAL

## 2025-06-25 ENCOUNTER — ONCOLOGY VISIT (OUTPATIENT)
Dept: ONCOLOGY | Facility: CLINIC | Age: 40
End: 2025-06-25
Attending: PHYSICIAN ASSISTANT
Payer: COMMERCIAL

## 2025-06-25 VITALS
HEART RATE: 61 BPM | BODY MASS INDEX: 23.69 KG/M2 | OXYGEN SATURATION: 100 % | DIASTOLIC BLOOD PRESSURE: 79 MMHG | SYSTOLIC BLOOD PRESSURE: 129 MMHG | WEIGHT: 157.2 LBS

## 2025-06-25 DIAGNOSIS — K86.89 PANCREATIC MASS: ICD-10-CM

## 2025-06-25 DIAGNOSIS — C7A.8: Primary | ICD-10-CM

## 2025-06-25 DIAGNOSIS — D3A.8 NEUROENDOCRINE NEOPLASM OF APPENDIX (H): ICD-10-CM

## 2025-06-25 DIAGNOSIS — K86.9 PANCREATIC LESION: ICD-10-CM

## 2025-06-25 LAB — FERRITIN SERPL-MCNC: 104 NG/ML (ref 6–175)

## 2025-06-25 PROCEDURE — 86316 IMMUNOASSAY TUMOR OTHER: CPT | Performed by: INTERNAL MEDICINE

## 2025-06-25 PROCEDURE — 36415 COLL VENOUS BLD VENIPUNCTURE: CPT | Performed by: INTERNAL MEDICINE

## 2025-06-25 PROCEDURE — 82607 VITAMIN B-12: CPT | Performed by: INTERNAL MEDICINE

## 2025-06-25 PROCEDURE — 99213 OFFICE O/P EST LOW 20 MIN: CPT | Performed by: INTERNAL MEDICINE

## 2025-06-25 PROCEDURE — 86301 IMMUNOASSAY TUMOR CA 19-9: CPT | Performed by: INTERNAL MEDICINE

## 2025-06-25 PROCEDURE — G2211 COMPLEX E/M VISIT ADD ON: HCPCS | Performed by: INTERNAL MEDICINE

## 2025-06-25 PROCEDURE — 82728 ASSAY OF FERRITIN: CPT | Performed by: INTERNAL MEDICINE

## 2025-06-25 PROCEDURE — 99205 OFFICE O/P NEW HI 60 MIN: CPT | Performed by: INTERNAL MEDICINE

## 2025-06-25 ASSESSMENT — PAIN SCALES - GENERAL: PAINLEVEL_OUTOF10: NO PAIN (0)

## 2025-06-25 NOTE — TELEPHONE ENCOUNTER
Pre assessment completed for upcoming procedure.   (Please see previous telephone encounter notes for complete details)      Procedure details:    Procedure date 7/15/25, arrival time 1200 and facility location reviewed.    Pre op exam needed? No.    Designated  policy reviewed and that site requests drivers to check in and stay on campus. Instructed to have someone stay 24  hours post procedure.   *Disclaimer - please notify the [facility] Claudia Op Manager with any  issues/concerns.    Medication review:    Medications reviewed. Please see supporting documentation below. Holding recommendations discussed (if applicable).       Prep for procedure:    Procedure prep instructions reviewed.        Any additional information needed:  N/A      Patient verbalized understanding and had no questions or concerns at this time.      Jasmin Escobar LPN  Endoscopy Procedure Pre Assessment   [phone] option 3

## 2025-06-25 NOTE — TELEPHONE ENCOUNTER
Pre visit planning completed.      Procedure details:    Patient scheduled for Endoscopic ultrasound (EUS) on 7/15/25.     Arrival time: 1200. Procedure time 1330    Facility location: [facility]; [address] [place] [address], 3rd Floor, [place] [address]. Check in location: Main entrance at registration desk.  *Disclaimer: Drivers are to check in with patient and stay on campus during procedure.     Sedation type: MAC    Pre op exam needed? No.    Indication for procedure: Evaluate pancreatic lesion       Chart review:     Electronic implanted devices? No    Recent diagnosis of diverticulitis within the last 6 weeks? No      Medication review:    Diabetic? No    Anticoagulants? No    Weight loss medication/injectable? No GLP-1 medication per patient's medication list. Nursing to verify with pre-assessment call.    Other medication HOLDING recommendations:  N/A      Prep for procedure:       Procedure information and instructions sent via Yorn         Juliet Quesada RN  Endoscopy Procedure Pre Assessment   [phone] option 3

## 2025-06-25 NOTE — PROGRESS NOTES
"[facility]    Hematology/Oncology Consult Note    Date of Consult:  06/25/25   Reason for Consult:   PCP:  Manisha Kolb    ASSESSMENT AND RECOMMENDATIONS:      Marlee Lloyd is a 39 year old patient who presented on 5/28/2025 with symptoms of appendicitis with an appendectomy showing a Appendiceal pT3Nx Well-differentiated     Problem List:    Appendiceal pT3Nx Well-differentiated neuroendocrine tumor  CT scan 5/28/2025- Fluid dilation of the appendix measuring up to 1.9 cm at the tip with mild associated inflammatory changes. Left upper quadrant calcified lesion measuring up to 2.3 cm, 6 mm well-circumscribed hypodense pancreatic body lesion is indeterminate, likely cystic.  APPENDIX, LAPAROSCOPIC APPENDECTOMY 5/28/2025  WHO grade 1 of 3. Tumor measures 1 cm in its greatest dimension. Subserosal involvement present. No lymphovascular invasion identified. Resection margin, negative for tumor. Ki-67 proliferative index: 2%   MRI abdomen 6/16/2025- 1.  A 6 mm cystic lesion in the pancreatic body without worrisome features. Calcified left abdominal mass present since 2016 and although nonspecific is likely benign.  pTNM (AJCC Version 9): pT3 pN not assigned  Left upper quadrant calcified lesion measuring up to 2.3 cm, which is of indeterminate etiology and significance. Likely benign.  6 mm well-circumscribed hypodense pancreatic body lesion is indeterminate, likely cystic- stable on MRI 6/16  SLE (systemic lupus erythematosus) (HC)- not requiring medication. SS-A antibody positive. Sjogren's syndrome (HC)   hypothyroidism - levothyroxin      Discussion  We discussed the diagnosis, prognosis and management of NET. The term \"neuroendocrine tumor\" (NET) refers to well-differentiated neuroendocrine neoplasms while \"Carcinoid syndrome\" is the term applied to a constellation of symptoms mediated by various hormones and other vasoactive substances that are secreted by some NETs. The " treatment of choice for a patient who has a localized well-differentiated NET is usually surgery. NETs are common in the appendix and small intestines. A staging workup is advised for individuals with an appendiceal NET >2 cm, those with an incomplete resection (either a lack of nodes or a positive margin), and those with concern for distant metastases based on clinical symptoms. While most NETs are relatively slow-growing neoplasms, some behave aggressively. Histologic grade and differentiation correlate closely with clinical behavior. Grade refers to the proliferative activity of tumors, commonly measured by the mitotic rate (number of mitotic figures per 10 high-powered fields [HPF]) or the Ki-67 index. In all cases, adjuvant therapy is not indicated for lower grade NET- as Aime NET does not show any concerning features by imaging, histology or size.     We would monitor residual disease or evidence of disease with a PET dotatate at the 3 month romina, and labs with a CBC and CMP, chromogranin A every 6-12 months with a yearly ferritin and B12 for ilial resection. We also discussed the prognosis of metastatic NET- which is generally excellent/     Recommendations    Resected well differentiated NET of the appendix  LUQ calcified mass- stable and likely benign  IPMN: EUS planned for 7/15/2025- will review the results and consider CT or MRI/MRCP in 6 months if inconclusive.     History of present illness: Marlee Lloyd is a 39 year old patient who presented to [facility] on 5/28/2025- with right lower quadrant abdominal pain of 1 day RLQ and up the the umbilocus. CT scan of the abdomen and pelvis showed appendicitis with possible mucocele.     Interim history- she presents for her initial oncology visit on 6/25/2025- she is doing well from a recovery standpoint. She also recently had surgery on her foot.    Review of Systems: See interval hx. Denies fevers, chills, HA, dizziness, n/t, changes in vision, cough,  sore throat, CP, SOB, abdominal pain, N/V, diarrhea, changes in urination, bleeding, bruising, rash.     PMHx and Social Hx reviewed per EPIC.    SHx:  The patient reports that she has never smoked. She has never used smokeless tobacco. She reports that she does not drink alcohol and does not use drugs.    Family History MS (multiple sclerosis)     Medications:  Current Outpatient Medications   Medication Sig Dispense Refill    acetaminophen (TYLENOL) 325 MG tablet Take 650 mg by mouth every 6 hours.      ibuprofen (ADVIL/MOTRIN) 600 MG tablet Take 600 mg by mouth every 8 hours as needed.      levothyroxine (SYNTHROID/LEVOTHROID) 125 MCG tablet Take 1 tablet (125 mcg) by mouth daily. 90 tablet 1    multivitamin w/minerals (MULTI-VITAMIN) tablet Take 1 tablet by mouth daily      triamcinolone (KENALOG) 0.1 % external cream Apply topically 2 times daily. For 2 weeks then daily thereafter 30 g 0    Vitamin D, Cholecalciferol, 25 MCG (1000 UT) TABS 2 once a day         Allergies   Allergen Reactions    Bactrim [Sulfamethoxazole-Trimethoprim] Nausea and Vomiting    Gabapentin Other (See Comments)     Urinary issues    Hydrocodone Other (See Comments)     Shaking    Imitrex [Sumatriptan] Muscle Pain (Myalgia)    Oxycodone Nausea and Vomiting     TRAMADOL OK     Shellfish-Derived Products Hives    Tramadol Cramps     Muscle twitching     Nickel Swelling and Rash    Penicillins Rash         EXAM:    LMP 05/29/2025 (Approximate)     GENERAL:  Female, in no acute distress.  Alert and oriented x3.   EXTREMITIES:  No clubbing, cyanosis, or edema.   SKIN: No rash  PSYCH: Calm and cooperative      Labs: CBC RESULTS:   Recent Labs   Lab Test 06/06/25  1523   WBC 8.4   RBC 4.39   HGB 13.2   HCT 38.9   MCV 89   MCH 30.1   MCHC 33.9   RDW 12.1         60 minutes spent on the date of the encounter doing chart review, review of outside records, review of test results, interpretation of tests, patient visit, and documentation      The longitudinal plan of care for the diagnosis(es)/condition(s) as documented were addressed during this visit. Due to the added complexity in care, I will continue to support Marlee in the subsequent management and with ongoing continuity of care.    Suzette Howe  Hematology/Oncology  [facility]

## 2025-06-25 NOTE — LETTER
6/25/2025      Marlee Lloyd  [address]  [facility] [address]      Dear Colleague,    Thank you for referring your patient, Marlee Lloyd, to the [facility]. Please see a copy of my visit note below.    [facility]    Hematology/Oncology Consult Note    Date of Consult:  06/25/25   Reason for Consult:   PCP:  Manisha Kolb    ASSESSMENT AND RECOMMENDATIONS:      Marlee Lloyd is a 39 year old patient who presented on 5/28/2025 with symptoms of appendicitis with an appendectomy showing a Appendiceal pT3Nx Well-differentiated     Problem List:    Appendiceal pT3Nx Well-differentiated neuroendocrine tumor  CT scan 5/28/2025- Fluid dilation of the appendix measuring up to 1.9 cm at the tip with mild associated inflammatory changes. Left upper quadrant calcified lesion measuring up to 2.3 cm, 6 mm well-circumscribed hypodense pancreatic body lesion is indeterminate, likely cystic.  APPENDIX, LAPAROSCOPIC APPENDECTOMY 5/28/2025  WHO grade 1 of 3. Tumor measures 1 cm in its greatest dimension. Subserosal involvement present. No lymphovascular invasion identified. Resection margin, negative for tumor. Ki-67 proliferative index: 2%   MRI abdomen 6/16/2025- 1.  A 6 mm cystic lesion in the pancreatic body without worrisome features. Calcified left abdominal mass present since 2016 and although nonspecific is likely benign.  pTNM (AJCC Version 9): pT3 pN not assigned  Left upper quadrant calcified lesion measuring up to 2.3 cm, which is of indeterminate etiology and significance. Likely benign.  6 mm well-circumscribed hypodense pancreatic body lesion is indeterminate, likely cystic- stable on MRI 6/16  SLE (systemic lupus erythematosus) (HC)- not requiring medication. SS-A antibody positive. Sjogren's syndrome (HC)   hypothyroidism - levothyroxin      Discussion  We discussed the diagnosis, prognosis and management of NET. The term  "\"neuroendocrine tumor\" (NET) refers to well-differentiated neuroendocrine neoplasms while \"Carcinoid syndrome\" is the term applied to a constellation of symptoms mediated by various hormones and other vasoactive substances that are secreted by some NETs. The treatment of choice for a patient who has a localized well-differentiated NET is usually surgery. NETs are common in the appendix and small intestines. A staging workup is advised for individuals with an appendiceal NET >2 cm, those with an incomplete resection (either a lack of nodes or a positive margin), and those with concern for distant metastases based on clinical symptoms. While most NETs are relatively slow-growing neoplasms, some behave aggressively. Histologic grade and differentiation correlate closely with clinical behavior. Grade refers to the proliferative activity of tumors, commonly measured by the mitotic rate (number of mitotic figures per 10 high-powered fields [HPF]) or the Ki-67 index. In all cases, adjuvant therapy is not indicated for lower grade NET- as Aime NET does not show any concerning features by imaging, histology or size.     We would monitor residual disease or evidence of disease with a PET dotatate at the 3 month romina, and labs with a CBC and CMP, chromogranin A every 6-12 months with a yearly ferritin and B12 for ilial resection. We also discussed the prognosis of metastatic NET- which is generally excellent/     Recommendations    Resected well differentiated NET of the appendix  LUQ calcified mass- stable and likely benign  IPMN: EUS planned for 7/15/2025- will review the results and consider CT or MRI/MRCP in 6 months if inconclusive.     History of present illness: Marlee Lloyd is a 39 year old patient who presented to [facility] on 5/28/2025- with right lower quadrant abdominal pain of 1 day RLQ and up the the umbilocus. CT scan of the abdomen and pelvis showed appendicitis with possible mucocele.     Interim " history- she presents for her initial oncology visit on 6/25/2025- she is doing well from a recovery standpoint. She also recently had surgery on her foot.    Review of Systems: See interval hx. Denies fevers, chills, HA, dizziness, n/t, changes in vision, cough, sore throat, CP, SOB, abdominal pain, N/V, diarrhea, changes in urination, bleeding, bruising, rash.     PMHx and Social Hx reviewed per EPIC.    SHx:  The patient reports that she has never smoked. She has never used smokeless tobacco. She reports that she does not drink alcohol and does not use drugs.    Family History MS (multiple sclerosis)     Medications:  Current Outpatient Medications   Medication Sig Dispense Refill     acetaminophen (TYLENOL) 325 MG tablet Take 650 mg by mouth every 6 hours.       ibuprofen (ADVIL/MOTRIN) 600 MG tablet Take 600 mg by mouth every 8 hours as needed.       levothyroxine (SYNTHROID/LEVOTHROID) 125 MCG tablet Take 1 tablet (125 mcg) by mouth daily. 90 tablet 1     multivitamin w/minerals (MULTI-VITAMIN) tablet Take 1 tablet by mouth daily       triamcinolone (KENALOG) 0.1 % external cream Apply topically 2 times daily. For 2 weeks then daily thereafter 30 g 0     Vitamin D, Cholecalciferol, 25 MCG (1000 UT) TABS 2 once a day         Allergies   Allergen Reactions     Bactrim [Sulfamethoxazole-Trimethoprim] Nausea and Vomiting     Gabapentin Other (See Comments)     Urinary issues     Hydrocodone Other (See Comments)     Shaking     Imitrex [Sumatriptan] Muscle Pain (Myalgia)     Oxycodone Nausea and Vomiting     TRAMADOL OK      Shellfish-Derived Products Hives     Tramadol Cramps     Muscle twitching      Nickel Swelling and Rash     Penicillins Rash         EXAM:    LMP 05/29/2025 (Approximate)     GENERAL:  Female, in no acute distress.  Alert and oriented x3.   EXTREMITIES:  No clubbing, cyanosis, or edema.   SKIN: No rash  PSYCH: Calm and cooperative      Labs: CBC RESULTS:   Recent Labs   Lab Test 06/06/25  1523    WBC 8.4   RBC 4.39   HGB 13.2   HCT 38.9   MCV 89   MCH 30.1   MCHC 33.9   RDW 12.1         60 minutes spent on the date of the encounter doing chart review, review of outside records, review of test results, interpretation of tests, patient visit, and documentation     The longitudinal plan of care for the diagnosis(es)/condition(s) as documented were addressed during this visit. Due to the added complexity in care, I will continue to support Marlee in the subsequent management and with ongoing continuity of care.    Suzette Howe  Hematology/Oncology  [facility]               Again, thank you for allowing me to participate in the care of your patient.        Sincerely,        Suzette Howe MD    Electronically signed

## 2025-06-26 LAB — VIT B12 SERPL-MCNC: 549 PG/ML (ref 232–1245)

## 2025-06-29 ENCOUNTER — NURSE TRIAGE (OUTPATIENT)
Dept: FAMILY MEDICINE | Facility: CLINIC | Age: 40
End: 2025-06-29
Payer: COMMERCIAL

## 2025-06-30 ENCOUNTER — OFFICE VISIT (OUTPATIENT)
Dept: PEDIATRICS | Facility: CLINIC | Age: 40
End: 2025-06-30
Payer: COMMERCIAL

## 2025-06-30 ENCOUNTER — ANCILLARY PROCEDURE (OUTPATIENT)
Dept: CT IMAGING | Facility: CLINIC | Age: 40
End: 2025-06-30
Attending: EMERGENCY MEDICINE
Payer: COMMERCIAL

## 2025-06-30 VITALS
RESPIRATION RATE: 16 BRPM | DIASTOLIC BLOOD PRESSURE: 85 MMHG | OXYGEN SATURATION: 99 % | HEART RATE: 68 BPM | SYSTOLIC BLOOD PRESSURE: 132 MMHG | TEMPERATURE: 97.3 F

## 2025-06-30 DIAGNOSIS — R10.9 LEFT SIDED ABDOMINAL PAIN: Primary | ICD-10-CM

## 2025-06-30 LAB
ALBUMIN UR-MCNC: NEGATIVE MG/DL
ANION GAP SERPL CALCULATED.3IONS-SCNC: 11 MMOL/L (ref 7–15)
APPEARANCE UR: CLEAR
BILIRUB UR QL STRIP: NEGATIVE
BUN SERPL-MCNC: 11.1 MG/DL (ref 6–20)
CALCIUM SERPL-MCNC: 9.6 MG/DL (ref 8.8–10.4)
CHLORIDE SERPL-SCNC: 102 MMOL/L (ref 98–107)
COLOR UR AUTO: COLORLESS
CREAT SERPL-MCNC: 0.67 MG/DL (ref 0.51–0.95)
EGFRCR SERPLBLD CKD-EPI 2021: >90 ML/MIN/1.73M2
ERYTHROCYTE [DISTWIDTH] IN BLOOD BY AUTOMATED COUNT: 11.8 % (ref 10–15)
GLUCOSE SERPL-MCNC: 82 MG/DL (ref 70–99)
GLUCOSE UR STRIP-MCNC: NEGATIVE MG/DL
HCG UR QL: NEGATIVE
HCO3 SERPL-SCNC: 28 MMOL/L (ref 22–29)
HCT VFR BLD AUTO: 39.5 % (ref 35–47)
HGB BLD-MCNC: 13.8 G/DL (ref 11.7–15.7)
HGB UR QL STRIP: NEGATIVE
KETONES UR STRIP-MCNC: NEGATIVE MG/DL
LEUKOCYTE ESTERASE UR QL STRIP: NEGATIVE
MCH RBC QN AUTO: 30.9 PG (ref 26.5–33)
MCHC RBC AUTO-ENTMCNC: 34.9 G/DL (ref 31.5–36.5)
MCV RBC AUTO: 88 FL (ref 78–100)
NITRATE UR QL: NEGATIVE
PH UR STRIP: 7 [PH] (ref 5–7)
PLATELET # BLD AUTO: 294 10E3/UL (ref 150–450)
POTASSIUM SERPL-SCNC: 3.7 MMOL/L (ref 3.4–5.3)
RBC # BLD AUTO: 4.47 10E6/UL (ref 3.8–5.2)
RBC #/AREA URNS AUTO: NORMAL /HPF
SODIUM SERPL-SCNC: 141 MMOL/L (ref 135–145)
SP GR UR STRIP: 1 (ref 1–1.03)
UROBILINOGEN UR STRIP-MCNC: NORMAL MG/DL
WBC # BLD AUTO: 8 10E3/UL (ref 4–11)
WBC #/AREA URNS AUTO: NORMAL /HPF

## 2025-06-30 PROCEDURE — 80048 BASIC METABOLIC PNL TOTAL CA: CPT | Performed by: EMERGENCY MEDICINE

## 2025-06-30 PROCEDURE — 1125F AMNT PAIN NOTED PAIN PRSNT: CPT | Performed by: EMERGENCY MEDICINE

## 2025-06-30 PROCEDURE — 99215 OFFICE O/P EST HI 40 MIN: CPT | Performed by: EMERGENCY MEDICINE

## 2025-06-30 PROCEDURE — 3079F DIAST BP 80-89 MM HG: CPT | Performed by: EMERGENCY MEDICINE

## 2025-06-30 PROCEDURE — 81001 URINALYSIS AUTO W/SCOPE: CPT | Performed by: EMERGENCY MEDICINE

## 2025-06-30 PROCEDURE — 85027 COMPLETE CBC AUTOMATED: CPT | Performed by: EMERGENCY MEDICINE

## 2025-06-30 PROCEDURE — 3075F SYST BP GE 130 - 139MM HG: CPT | Performed by: EMERGENCY MEDICINE

## 2025-06-30 PROCEDURE — 81025 URINE PREGNANCY TEST: CPT | Performed by: EMERGENCY MEDICINE

## 2025-06-30 PROCEDURE — 74177 CT ABD & PELVIS W/CONTRAST: CPT | Performed by: RADIOLOGY

## 2025-06-30 PROCEDURE — 36415 COLL VENOUS BLD VENIPUNCTURE: CPT | Performed by: EMERGENCY MEDICINE

## 2025-06-30 RX ORDER — IOPAMIDOL 755 MG/ML
86 INJECTION, SOLUTION INTRAVASCULAR ONCE
Status: COMPLETED | OUTPATIENT
Start: 2025-06-30 | End: 2025-06-30

## 2025-06-30 RX ADMIN — IOPAMIDOL 86 ML: 755 INJECTION, SOLUTION INTRAVASCULAR at 14:42

## 2025-06-30 ASSESSMENT — PAIN SCALES - GENERAL: PAINLEVEL_OUTOF10: MILD PAIN (2)

## 2025-06-30 NOTE — TELEPHONE ENCOUNTER
Writer contacted [facility]. [facility] provider will review patient symptoms, contact writer/clinic if referral is accepted.    Referral to Acute and Diagnostic Services    [phone] ([place]) [place] [id number] [place] [id number]    Transition to Acute & Diagnostic Services Clinic has been discussed with patient, and she agrees with next level of care.   Patient understands that evaluation/treatment at [facility] typically takes significantly longer than in clinic/urgent care (>2 hours).  [facility]ostics [facility] has been contacted by provider/staff to confirm patient acceptance.     Special issues:      None      The following provider has assessed this patient for intervention at [facility], and directed the patient for referral: Valorie Figueroa RN

## 2025-06-30 NOTE — PATIENT INSTRUCTIONS
Imaging today shows some swelling around your incision site.  You could have slightly strained or torn the tissue in that area.  To help it heal avoid heaving lifting for the next several weeks. Let pain be your guide.      Take over the counter pain medications as needed for the pain.     Please go to the emergency room for worsening pain.

## 2025-06-30 NOTE — TELEPHONE ENCOUNTER
Writer received message via [employer] Teams from Noemi Phelps at [facility].  Provider has accepted patient, [facility] staff will contact patient with plan.    Valorie Figueroa RN  [facility]

## 2025-06-30 NOTE — TELEPHONE ENCOUNTER
"Patient calling with concerns about sudden pain over LUQ following laparoscopic appendectomy.     Background:  Emergency laparoscopic appendectomy completed at [facility] 5/28/25.  Per procedure note, appendix was elevated and \"very elongated\" resulting in 12mm incision.  Pancreatic mass identified on related MRI abdomen, referral to oncology surgery for follow-up.  Patient reports developing lump near incision following surgery, reports this was discussed at 6/6/25 ED follow-up with PCP though writer was unable to find documentation of discussion.  Patient reports surgical pain resolved almost completely prior to 6/29/25.    Assessment:  Patient helped her child lift suitcases at [place] 6/28 - no immediate symptoms.  LUQ pain started suddenly 6/29, also having generalized abdominal pain.  Pain is sharp, 6/10  Aggravated by standing up, twisting, reaching. Patient reports she is unable to reach her hair.  Radiates around side back if patient stands too long, twists.  Reports lump has enlarged since 6/6 OV.  Endorses intermittent mild nausea  Endorses mild constipation - had small BM this AM.  Denies fever, vomiting, major changes in bowel/urinary habits.    RN Recommendation:  ED/ADS/UC - RN feels ED or ADS would be most appropriate.  Advised patient writer will call [facility] with referral, either ADS staff or writer will return call to patient with plan.    Valorie Figueroa RN  [facility]    Reason for Disposition   MILD TO MODERATE constant pain lasting > 2 hours    Additional Information   Negative: Passed out (e.g., fainted, lost consciousness, blacked out and was not responding)   Negative: Shock suspected (e.g., cold/pale/clammy skin, too weak to stand, low BP, rapid pulse)   Negative: Sounds like a life-threatening emergency to the triager   Negative: Followed an abdomen (stomach) injury   Negative: Chest pain   Negative: Abdominal pain and pregnant < 20 weeks   Negative: Abdominal " "pain and pregnant 20 or more weeks   Negative: Pain is mainly in upper abdomen (if needed ask: 'is it mainly above the belly button?')   Negative: Abdomen bloating or swelling are main symptoms   Negative: SEVERE abdominal pain (e.g., excruciating)   Negative: Vomiting red blood or black (coffee ground) material   Negative: Blood in bowel movements  (Exception: Blood on surface of BM with constipation.)   Negative: Black or tarry bowel movements  (Exception: Chronic-unchanged black-grey BMs AND is taking iron pills or Pepto-Bismol.)    Answer Assessment - Initial Assessment Questions  1. LOCATION: \"Where does it hurt?\"       Centered at LLQ where surgical incision was made, also having generalized pain.  2. RADIATION: \"Does the pain shoot anywhere else?\" (e.g., chest, back)      If lying on side or standing too long, \"pulling\" in lower back.  3. ONSET: \"When did the pain begin?\" (e.g., minutes, hours or days ago)       Pain had \"all but gone away\" until this weekend.  4. SUDDEN: \"Gradual or sudden onset?\"      Felt nothing at time of lifting, sudden onset following day.  5. PATTERN \"Does the pain come and go, or is it constant?\"      Constant  6. SEVERITY: \"How bad is the pain?\"  (e.g., Scale 1-10; mild, moderate, or severe)      6/10  7. RECURRENT SYMPTOM: \"Have you ever had this type of stomach pain before?\" If Yes, ask: \"When was the last time?\" and \"What happened that time?\"       *No Answer*  8. CAUSE: \"What do you think is causing the stomach pain?\"      *No Answer*  9. RELIEVING/AGGRAVATING FACTORS: \"What makes it better or worse?\" (e.g., antacids, bending or twisting motion, bowel movement)      *No Answer*  10. OTHER SYMPTOMS: \"Do you have any other symptoms?\" (e.g., back pain, diarrhea, fever, urination pain, vomiting)        Denies fever, vomiting.  Endorses intermittent nausea - \"comes in waves\", mild constipation - last BM this AM.  11. PREGNANCY: \"Is there any chance you are pregnant?\" \"When was your " "last menstrual period?\"        *No Answer*    Protocols used: Abdominal Pain - Female-A-[place]    "

## 2025-06-30 NOTE — PROGRESS NOTES
"Acute and Diagnostic Services Clinic Visit    Assessment & Plan     Left sided abdominal pain  Appendectomy 5/28/25.  Having some \"mass\" in luq for few weeks.  Since Saturday worse pain around left abdominal scar and area of \"mass.\"  Walks a bit hunched over to protect this area and worse with movement or touch.  Could be muscular or abdominal wall.  Will do ct to r/o hernia, bleed, diverticulitis. Work up:  normal cbc, comp met, lipase, ua, upt.   Ct abdomen pelvis was done and shows:  1.  Left lower quadrant incision with minimal surrounding edema. No postoperative fluid collection or hematoma.  2.  Appendectomy with expected postoperative changes. No hematoma or fluid collection within the surgical bed.  3.  Interval migration of the previously seen indeterminate but likely benign 2.6 cm calcified mass into the central mesentery.  4.  Stable 6 mm pancreatic body cyst.    Based on imaging with minimal incisional edema, she likely strained or tore her incision site.  No fluid collection or hematoma noted.  She says she was lifting heavy item before the pain.  She was told by her surgeon she had to restrict her lifting after surgery for 2 weeks. I asked that she limit her lifting for the next several weeks to allow the incisions to heal.  We discussed letting pain be her guide - if it hurts don't lift it.  She will use otc meds for pain if needed.     - Basic metabolic panel  - CBC with platelets  - UA with Microscopic reflex to Culture  - CT Abdomen Pelvis w Contrast  - HCG qualitative urine      42 minutes spent by me on the date of the encounter doing chart review, review of outside records, review of test results, interpretation of tests, patient visit, and documentation     Discharge instructions:  Imaging today shows some swelling around your incision site.  You could have slightly strained or torn the tissue in that area.  To help it heal avoid heaving lifting for the next several weeks. Let pain be your " "guide.      Take over the counter pain medications as needed for the pain.     Please go to the emergency room for worsening pain.       Anita Whalen is a 39 year old, presenting for the following health issues:  Abdominal Pain (LLQ Lump and Pain - Saturday)    38 yo female with recent appendectomy on 5/28/25.  This past Saturday she experienced worsening pain left side of abdomen.  She was lifting heavy suitcases on Friday. She has had a lump in the left upper abdomen for several weeks and the pain seems to be around this region.  She says yesterday the pain was much worse and is worse with touching it, moving.  She has had some slight nausea but no vomiting. She is eating \"fine.\"  She hasn't stool much the past couple days.  Denies urinary changes. 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              Abdominal/Flank Pain  Onset/Duration: Saturday   Description:   Character: Sharp and Ache, Cramping   Location: left lower quadrant  Radiation: Back (if laying on side or standing too long)   Intensity: moderate  Progression of Symptoms:  worsening  Accompanying Signs & Symptoms:  Fever/chills: no   Gas/Bloating: YES- bloating   Nausea: YES  Vomitting: no   Diarrhea: no   Constipation:YES- \"kind of\", pt is having smaller bms  Dysuria: no            Hematuria: no            Frequency: no            Incontinence of urine: no   History:            Last bowel movement: today  Trauma: Possibly - Friday was lifting heavy suitcases.  Lump has been present for 4.5 weeks   Previous similar pain: no    Previous tests done: none           Previous Abdominal surgery: YES- appendectomy, tubal ligation 10 years ago   Precipitating factors:   Does the pain change with:     Food: no      Bowel Movement: no     Urination: YES             Other factors: no   Therapies tried and outcome:  Ibuprofen     When food last eaten: Ate Lunch             Objective    LMP 06/20/2025 (Approximate) "   There is no height or weight on file to calculate BMI.  Physical Exam  Vitals and nursing note reviewed.   Constitutional:       General: She is not in acute distress.     Appearance: She is normal weight. She is not ill-appearing, toxic-appearing or diaphoretic.      Comments: Moves slowly and walks slightly hunched over due to abdominal discomfort.    HENT:      Head: Normocephalic and atraumatic.      Nose: Nose normal.   Eyes:      Extraocular Movements: Extraocular movements intact.   Cardiovascular:      Rate and Rhythm: Normal rate and regular rhythm.      Heart sounds: Normal heart sounds.   Pulmonary:      Effort: Pulmonary effort is normal.      Breath sounds: Normal breath sounds.   Abdominal:      General: There is no distension.      Palpations: Abdomen is soft. There is mass.      Tenderness: There is abdominal tenderness. There is no left CVA tenderness, guarding or rebound.      Hernia: No hernia is present.      Comments: Sensitivity over left abdominal recent appendectomy scar and firmness with tenderness in the area above this scar.    Musculoskeletal:         General: Normal range of motion.      Cervical back: Normal range of motion.   Skin:     General: Skin is warm and dry.   Neurological:      General: No focal deficit present.      Mental Status: She is alert and oriented to person, place, and time.   Psychiatric:         Mood and Affect: Mood normal.            Recent Results (from the past 24 hours)   UA with Microscopic reflex to Culture    Specimen: Urine, Clean Catch   Result Value Ref Range    Color Urine Colorless Colorless, Straw, Light Yellow, Yellow    Appearance Urine Clear Clear    Glucose Urine Negative Negative mg/dL    Bilirubin Urine Negative Negative    Ketones Urine Negative Negative mg/dL    Specific Gravity Urine 1.004 0.999 - 1.035    Blood Urine Negative Negative    pH Urine 7.0 5.0 - 7.0    Protein Albumin Urine Negative Negative mg/dL    Nitrite Urine Negative  Negative    Leukocyte Esterase Urine Negative Negative    Urobilinogen Urine Normal 0.2, 1.0, <2.0, Normal mg/dL   HCG qualitative urine   Result Value Ref Range    hCG Urine Qualitative Negative Negative   UA Microscopic with Reflex to Culture   Result Value Ref Range    RBC Urine None Seen 0-2 /HPF /HPF    WBC Urine None Seen 0-5 /HPF /HPF    Narrative    Urine Culture not indicated   Basic metabolic panel   Result Value Ref Range    Sodium 141 135 - 145 mmol/L    Potassium 3.7 3.4 - 5.3 mmol/L    Chloride 102 98 - 107 mmol/L    Carbon Dioxide (CO2) 28 22 - 29 mmol/L    Anion Gap 11 7 - 15 mmol/L    Urea Nitrogen 11.1 6.0 - 20.0 mg/dL    Creatinine 0.67 0.51 - 0.95 mg/dL    GFR Estimate >90 >60 mL/min/1.73m2    Calcium 9.6 8.8 - 10.4 mg/dL    Glucose 82 70 - 99 mg/dL   CBC with platelets   Result Value Ref Range    WBC Count 8.0 4.0 - 11.0 10e3/uL    RBC Count 4.47 3.80 - 5.20 10e6/uL    Hemoglobin 13.8 11.7 - 15.7 g/dL    Hematocrit 39.5 35.0 - 47.0 %    MCV 88 78 - 100 fL    MCH 30.9 26.5 - 33.0 pg    MCHC 34.9 31.5 - 36.5 g/dL    RDW 11.8 10.0 - 15.0 %    Platelet Count 294 150 - 450 10e3/uL   CT Abdomen Pelvis w Contrast    Narrative    EXAM: CT ABDOMEN PELVIS W CONTRAST  LOCATION: [facility]  DATE: 6/30/2025    INDICATION: recent appendcectomy.  worsening pain left abdomen by scar with new mass and pain.  COMPARISON: CT 5/20/2025 and 2/11/2016, MR 6/16/2025  TECHNIQUE: CT scan of the abdomen and pelvis was performed following injection of IV contrast. Multiplanar reformats were obtained. Dose reduction techniques were used.  CONTRAST: Isovue 370  86 ml    FINDINGS:   LOWER CHEST: Mild bibasilar atelectasis.    HEPATOBILIARY: Normal.    PANCREAS: 6 mm pancreatic body cyst better evaluated on comparison MRI. No dilatation of the pancreatic duct. No inflammatory changes.    SPLEEN: Normal.    ADRENAL GLANDS: Normal.    KIDNEYS/BLADDER: Normal.    BOWEL: Appendectomy. No bowel  obstruction or inflammatory process. No free air, free fluid or fluid collection. There is a 2.6 cm central mesenteric calcification which has migrated from the left lower quadrant.    LYMPH NODES: Normal.    VASCULATURE: Mild aortic atherosclerosis. Patent central SMA and SMV.    PELVIC ORGANS: 1.9 cm right ovarian collapsing physiologic cyst and 1 cm dominant left ovarian follicle. No pelvic free fluid.    MUSCULOSKELETAL: Left lower quadrant incision with minimal surrounding edema. No associated fluid collection.      Impression    IMPRESSION:   1.  Left lower quadrant incision with minimal surrounding edema. No postoperative fluid collection or hematoma.  2.  Appendectomy with expected postoperative changes. No hematoma or fluid collection within the surgical bed.  3.  Interval migration of the previously seen indeterminate but likely benign 2.6 cm calcified mass into the central mesentery.  4.  Stable 6 mm pancreatic body cyst.           Signed Electronically by: Lucille Walker MD

## 2025-07-14 ENCOUNTER — ANESTHESIA EVENT (OUTPATIENT)
Dept: GASTROENTEROLOGY | Facility: CLINIC | Age: 40
End: 2025-07-14
Payer: COMMERCIAL

## 2025-07-14 RX ORDER — DEXAMETHASONE SODIUM PHOSPHATE 4 MG/ML
4 INJECTION, SOLUTION INTRA-ARTICULAR; INTRALESIONAL; INTRAMUSCULAR; INTRAVENOUS; SOFT TISSUE
Status: CANCELLED | OUTPATIENT
Start: 2025-07-14

## 2025-07-14 RX ORDER — OXYCODONE HYDROCHLORIDE 10 MG/1
10 TABLET ORAL
Refills: 0 | Status: CANCELLED | OUTPATIENT
Start: 2025-07-14

## 2025-07-14 RX ORDER — NALOXONE HYDROCHLORIDE 0.4 MG/ML
0.1 INJECTION, SOLUTION INTRAMUSCULAR; INTRAVENOUS; SUBCUTANEOUS
Status: CANCELLED | OUTPATIENT
Start: 2025-07-14

## 2025-07-14 RX ORDER — ONDANSETRON 2 MG/ML
4 INJECTION INTRAMUSCULAR; INTRAVENOUS EVERY 30 MIN PRN
Status: CANCELLED | OUTPATIENT
Start: 2025-07-14

## 2025-07-14 RX ORDER — OXYCODONE HYDROCHLORIDE 5 MG/1
5 TABLET ORAL
Refills: 0 | Status: CANCELLED | OUTPATIENT
Start: 2025-07-14

## 2025-07-14 RX ORDER — ONDANSETRON 4 MG/1
4 TABLET, ORALLY DISINTEGRATING ORAL EVERY 30 MIN PRN
Status: CANCELLED | OUTPATIENT
Start: 2025-07-14

## 2025-07-14 ASSESSMENT — LIFESTYLE VARIABLES: TOBACCO_USE: 0

## 2025-07-15 ENCOUNTER — ANESTHESIA (OUTPATIENT)
Dept: GASTROENTEROLOGY | Facility: CLINIC | Age: 40
End: 2025-07-15
Payer: COMMERCIAL

## 2025-07-15 ENCOUNTER — HOSPITAL ENCOUNTER (OUTPATIENT)
Facility: CLINIC | Age: 40
Discharge: HOME | End: 2025-07-15
Attending: INTERNAL MEDICINE | Admitting: INTERNAL MEDICINE
Payer: COMMERCIAL

## 2025-07-15 VITALS
SYSTOLIC BLOOD PRESSURE: 114 MMHG | OXYGEN SATURATION: 100 % | HEART RATE: 74 BPM | TEMPERATURE: 98.2 F | DIASTOLIC BLOOD PRESSURE: 68 MMHG | RESPIRATION RATE: 16 BRPM

## 2025-07-15 PROCEDURE — 258N000003 HC RX IP 258 OP 636

## 2025-07-15 PROCEDURE — 250N000009 HC RX 250

## 2025-07-15 PROCEDURE — 43237 ENDOSCOPIC US EXAM ESOPH: CPT | Performed by: INTERNAL MEDICINE

## 2025-07-15 PROCEDURE — 370N000017 HC ANESTHESIA TECHNICAL FEE, PER MIN: Performed by: INTERNAL MEDICINE

## 2025-07-15 PROCEDURE — 250N000011 HC RX IP 250 OP 636

## 2025-07-15 RX ORDER — LIDOCAINE 40 MG/G
CREAM TOPICAL
Status: DISCONTINUED | OUTPATIENT
Start: 2025-07-15 | End: 2025-07-15 | Stop reason: HOSPADM

## 2025-07-15 RX ORDER — SODIUM CHLORIDE, SODIUM LACTATE, POTASSIUM CHLORIDE, CALCIUM CHLORIDE 600; 310; 30; 20 MG/100ML; MG/100ML; MG/100ML; MG/100ML
INJECTION, SOLUTION INTRAVENOUS CONTINUOUS PRN
Status: DISCONTINUED | OUTPATIENT
Start: 2025-07-15 | End: 2025-07-15

## 2025-07-15 RX ORDER — PROPOFOL 10 MG/ML
INJECTION, EMULSION INTRAVENOUS CONTINUOUS PRN
Status: DISCONTINUED | OUTPATIENT
Start: 2025-07-15 | End: 2025-07-15

## 2025-07-15 RX ORDER — SODIUM CHLORIDE, SODIUM LACTATE, POTASSIUM CHLORIDE, CALCIUM CHLORIDE 600; 310; 30; 20 MG/100ML; MG/100ML; MG/100ML; MG/100ML
INJECTION, SOLUTION INTRAVENOUS CONTINUOUS
Status: DISCONTINUED | OUTPATIENT
Start: 2025-07-15 | End: 2025-07-15 | Stop reason: HOSPADM

## 2025-07-15 RX ORDER — ONDANSETRON 2 MG/ML
INJECTION INTRAMUSCULAR; INTRAVENOUS PRN
Status: DISCONTINUED | OUTPATIENT
Start: 2025-07-15 | End: 2025-07-15

## 2025-07-15 RX ORDER — PROPOFOL 10 MG/ML
INJECTION, EMULSION INTRAVENOUS PRN
Status: DISCONTINUED | OUTPATIENT
Start: 2025-07-15 | End: 2025-07-15

## 2025-07-15 RX ADMIN — PROPOFOL 125 MCG/KG/MIN: 10 INJECTION, EMULSION INTRAVENOUS at 12:58

## 2025-07-15 RX ADMIN — PROPOFOL 25 MG: 10 INJECTION, EMULSION INTRAVENOUS at 13:02

## 2025-07-15 RX ADMIN — ONDANSETRON 4 MG: 2 INJECTION INTRAMUSCULAR; INTRAVENOUS at 13:06

## 2025-07-15 RX ADMIN — TOPICAL ANESTHETIC 1 SPRAY: 200 SPRAY DENTAL; PERIODONTAL at 12:52

## 2025-07-15 RX ADMIN — PROPOFOL 25 MG: 10 INJECTION, EMULSION INTRAVENOUS at 12:55

## 2025-07-15 RX ADMIN — SODIUM CHLORIDE, SODIUM LACTATE, POTASSIUM CHLORIDE, AND CALCIUM CHLORIDE: .6; .31; .03; .02 INJECTION, SOLUTION INTRAVENOUS at 12:50

## 2025-07-15 ASSESSMENT — ACTIVITIES OF DAILY LIVING (ADL)
ADLS_ACUITY_SCORE: 41

## 2025-07-15 NOTE — ANESTHESIA PREPROCEDURE EVALUATION
Anesthesia Pre-Procedure Evaluation    Patient: Marlee Lloyd   MRN: [id number] : 1985          Procedure : Procedure(s):  Endoscopic ultrasound upper gastrointestinal tract (GI)         Past Medical History:   Diagnosis Date    Hypothyroidism 10/26/2010    Sjogren's syndrome       Past Surgical History:   Procedure Laterality Date    AS RAD RESEC TONSIL/PILLARS      emergent appendectomy  2025    EXCISE EXOSTOSIS FOOT Left 2024    Procedure: LEFT KIDNER PROCEDURE;  Surgeon: Gold Bazan DPM;  Location: [facility] OR    TUBAL LIGATION      wisdom teeth        Allergies   Allergen Reactions    Bactrim [Sulfamethoxazole-Trimethoprim] Nausea and Vomiting    Gabapentin Other (See Comments)     Urinary issues    Hydrocodone Other (See Comments)     Shaking    Imitrex [Sumatriptan] Muscle Pain (Myalgia)    Oxycodone Nausea and Vomiting     TRAMADOL OK     Shellfish-Derived Products Hives    Tramadol Cramps     Muscle twitching     Nickel Swelling and Rash    Penicillins Rash      Social History     Tobacco Use    Smoking status: Never     Passive exposure: Never    Smokeless tobacco: Never   Substance Use Topics    Alcohol use: Never      Wt Readings from Last 1 Encounters:   25 71.3 kg (157 lb 3.2 oz)        Anesthesia Evaluation   Pt has had prior anesthetic. Type: General.        ROS/MED HX  ENT/Pulmonary:    (-) tobacco use and sleep apnea   Neurologic:  - neg neurologic ROS     Cardiovascular:  - neg cardiovascular ROS     METS/Exercise Tolerance: >4 METS    Hematologic:  - neg hematologic  ROS     Musculoskeletal: Comment: Sjogren's syndrome      GI/Hepatic: Comment: Pancreatic cystic lesion  Appendicitis s/p appendectomy 2025      Renal/Genitourinary:  - neg Renal ROS     Endo:     (+)          thyroid problem, hypothyroidism,           Psychiatric/Substance Use:  - neg psychiatric ROS     Infectious Disease:  - neg infectious disease ROS     Malignancy:   (+) Malignancy,  "History of Other.Other CA Appendiceal Remission status post.    Other: Comment: Hx of Lupus/ Sjogren's Disease             Physical Exam  Airway  Mallampati: I  TM distance: >3 FB  Neck ROM: full  Upper bite lip test: I  Mouth opening: >= 4 cm    Cardiovascular - normal exam  Rhythm: regular  Rate: normal rate     Dental   (+) Minor Abnormalities - some fillings, tiny chips      Pulmonary - normal examBreath sounds clear to auscultation        Neurological   She appears awake, alert and oriented x3.    Other Findings       OUTSIDE LABS:  CBC:   Lab Results   Component Value Date    WBC 8.0 06/30/2025    WBC 8.4 06/06/2025    HGB 13.8 06/30/2025    HGB 13.2 06/06/2025    HCT 39.5 06/30/2025    HCT 38.9 06/06/2025     06/30/2025     06/06/2025     BMP:   Lab Results   Component Value Date     06/30/2025     02/21/2025    POTASSIUM 3.7 06/30/2025    POTASSIUM 3.8 02/21/2025    CHLORIDE 102 06/30/2025    CHLORIDE 101 02/21/2025    CO2 28 06/30/2025    CO2 27 02/21/2025    BUN 11.1 06/30/2025    BUN 12.4 02/21/2025    CR 0.67 06/30/2025    CR 0.73 02/21/2025    GLC 82 06/30/2025    GLC 91 02/21/2025     COAGS: No results found for: \"PTT\", \"INR\", \"FIBR\"  POC:   Lab Results   Component Value Date    HCG Negative 06/30/2025     HEPATIC:   Lab Results   Component Value Date    ALBUMIN 4.5 06/06/2025    PROTTOTAL 6.9 06/06/2025    ALT 26 06/06/2025    AST 28 06/06/2025    ALKPHOS 55 06/06/2025    BILITOTAL 0.2 06/06/2025     OTHER:   Lab Results   Component Value Date    STEFAN 9.6 06/30/2025    TSH 2.26 02/21/2025    T4 1.24 12/23/2022       Anesthesia Plan    ASA Status:  2      NPO Status: NPO Appropriate   Anesthesia Type: General.  Airway: natural airway.  Induction: intravenous.  Maintenance: TIVA.   Techniques and Equipment:       - Monitoring Plan: standard ASA monitoring     Consents    Anesthesia Plan(s) and associated risks, benefits, and realistic alternatives discussed. Questions answered " and patient/representative(s) expressed understanding.     - Discussed: anesthesiologist     - Discussed with:  Patient        - Pt is DNR/DNI Status: no DNR          Postoperative Care    Pain management: multimodal analgesia.     Comments:                   Vernon Alonso MD    I have reviewed the pertinent notes and labs in the chart from the past 30 days and (re)examined the patient.  Any updates or changes from those notes are reflected in this note.    Clinically Significant Risk Factors Present on Admission

## 2025-07-15 NOTE — OR NURSING
EUS with no interventions performed under MAC, patient tolerated well. Transferred to  and report given to recovery RN.

## 2025-07-15 NOTE — ANESTHESIA POSTPROCEDURE EVALUATION
Patient: Marlee Lloyd    Procedure: Procedure(s):  Endoscopic ultrasound upper gastrointestinal tract (GI)       Anesthesia Type:  MAC    Note:  Disposition: Outpatient   Postop Pain Control: Uneventful            Sign Out: Well controlled pain   PONV: No   Neuro/Psych: Uneventful            Sign Out: Acceptable/Baseline neuro status   Airway/Respiratory: Uneventful            Sign Out: Acceptable/Baseline resp. status   CV/Hemodynamics: Uneventful            Sign Out: Acceptable CV status; No obvious hypovolemia; No obvious fluid overload   Other NRE: NONE   DID A NON-ROUTINE EVENT OCCUR? No           Last vitals:  Vitals Value Taken Time   /68 07/15/25 13:27   Temp     Pulse 74 07/15/25 13:27   Resp     SpO2 100 % 07/15/25 13:50   Vitals shown include unfiled device data.    Electronically Signed By: Eric Olson MD  July 15, 2025  1:51 PM

## 2025-07-15 NOTE — ANESTHESIA CARE TRANSFER NOTE
Patient: Marlee Lloyd    Procedure: Procedure(s):  Endoscopic ultrasound upper gastrointestinal tract (GI)       Diagnosis: Pancreatic lesion [K86.9]  Diagnosis Additional Information: No value filed.    Anesthesia Type:   MAC     Note:    Oropharynx: oropharynx clear of all foreign objects  Level of Consciousness: awake  Oxygen Supplementation: room air    Independent Airway: airway patency satisfactory and stable  Dentition: dentition unchanged  Vital Signs Stable: post-procedure vital signs reviewed and stable    Patient transferred to: Phase II  Comments: Vvs, stable, report to rn       Vitals:  Vitals Value Taken Time   BP     Temp     Pulse 80 07/15/25 13:26   Resp     SpO2 100 % 07/15/25 13:26   Vitals shown include unfiled device data.    Electronically Signed By: Vernon Alonso MD  July 15, 2025  1:27 PM

## 2025-07-15 NOTE — DISCHARGE INSTRUCTIONS
November 18, 2020      Gita Negrete  [address] [place] [id number]         Dear Roxanna:    The prescription for your losartan-hctz has been sent to the pharmacy for you.     Please call my office to schedule an appointment for your medication check in December.    Sincerely,      Emiliano Licona MD  [facility]   [id number] [place]  [address] [phone]       .dcDischarge Instructions after Endoscopic Ultrasound    Activity  You were given medicine for pain. You may be dizzy or sleepy.    For 24 hours:  Do not drive or use heavy equipment.  Do not make important decisions.  Do not drink any alcohol.    Diet  Wait one hour before eating or drinking. Start with sips of water. When your gag reflex has returned you  may go back to your usual diet, medicines and light exercise.    Discomfort  Some bloating is normal. You may have large burps or pass air.  You may have a sore throat for 2 to 3 days. It may help to:  Avoid hot liquids for 24 hours.  Use sore throat lozenges.  Gargle as needed with salt water up to 4 times a day. Mix 1 cup of warm water with 1 teaspoon of salt. Do not swallow.  You may take Tylenol (acetaminophen) for pain unless your doctor has told you not to.    Do not take aspirin or ibuprofen (Advil, Motrin, or other anti-inflammatory  drugs) for _____ days.    Follow-up  ___ We took small tissue or fluid samples to study. We will call you with the results in about 10 working days.    When to call    Call right away if you have:  Severe throat pain or trouble swallowing  Black stools (tar-like looking bowel movement)  Fever above 100.6 F (37.5 C)  Unusual pain in belly or chest not relieved by belching or passing air.    If you vomit blood or have severe pain, go to an emergency room.    If you have questions, call    Monday to Friday, 8 a.m. to 4:30 p.m.:   Central Scheduling Department: [phone]  After hours: Hospital: [phone] (Ask for the GI fellow on call)

## 2025-07-16 LAB — UPPER EUS: NORMAL

## 2025-07-23 ENCOUNTER — TRANSFERRED RECORDS (OUTPATIENT)
Dept: HEALTH INFORMATION MANAGEMENT | Facility: CLINIC | Age: 40
End: 2025-07-23
Payer: COMMERCIAL

## 2025-08-17 DIAGNOSIS — E89.0 POSTSURGICAL HYPOTHYROIDISM: ICD-10-CM

## 2025-08-18 RX ORDER — LEVOTHYROXINE SODIUM 125 UG/1
125 TABLET ORAL DAILY
Qty: 90 TABLET | Refills: 1 | Status: SHIPPED | OUTPATIENT
Start: 2025-08-18

## 2025-08-27 ENCOUNTER — PATIENT OUTREACH (OUTPATIENT)
Dept: CARE COORDINATION | Facility: CLINIC | Age: 40
End: 2025-08-27
Payer: COMMERCIAL

## (undated) DEVICE — BNDG KLING 4" 2236

## (undated) DEVICE — SU ETHILON 4-0 FS-2 18" 662H

## (undated) DEVICE — LINEN TOWEL PACK X5 5464

## (undated) DEVICE — DRSG GAUZE 4X4" 3033

## (undated) DEVICE — DRSG KERLIX FLUFFS X5

## (undated) DEVICE — BLADE SAW OSCILLATING STRYK MED 9.0X25X0.38MM 2296-003-111

## (undated) DEVICE — SOL WATER IRRIG 1000ML BOTTLE 2F7114

## (undated) DEVICE — PACK EXTREMITY SOP15EXFSD

## (undated) DEVICE — PREP CHLORAPREP 26ML TINTED HI-LITE ORANGE 930815

## (undated) DEVICE — DRAPE STERI TOWEL LG 1010

## (undated) DEVICE — SOL NACL 0.9% IRRIG 1000ML BOTTLE 2F7124

## (undated) DEVICE — DECANTER BAG 2002S

## (undated) DEVICE — NDL 25GA 1.5" 305127

## (undated) DEVICE — IMM LIMB ELEVATOR DC40-0203

## (undated) DEVICE — SU ETHILON 5-0 P-3 18" BLACK 698G

## (undated) DEVICE — BNDG ELASTIC 4"X15YDS STERILE

## (undated) DEVICE — SU VICRYL 4-0 PS-2 18" UND J496H

## (undated) DEVICE — KIT ARTHROSCOPIC SURGICAL PROCEDURE DISP AR-8990DS

## (undated) DEVICE — SYR 10ML FINGER CONTROL W/O NDL 309695

## (undated) DEVICE — SU VICRYL 3-0 PS-1 18" UND J683

## (undated) RX ORDER — DEXAMETHASONE SODIUM PHOSPHATE 4 MG/ML
INJECTION, SOLUTION INTRA-ARTICULAR; INTRALESIONAL; INTRAMUSCULAR; INTRAVENOUS; SOFT TISSUE
Status: DISPENSED
Start: 2024-06-26

## (undated) RX ORDER — FENTANYL CITRATE 0.05 MG/ML
INJECTION, SOLUTION INTRAMUSCULAR; INTRAVENOUS
Status: DISPENSED
Start: 2024-06-26

## (undated) RX ORDER — SCOLOPAMINE TRANSDERMAL SYSTEM 1 MG/1
PATCH, EXTENDED RELEASE TRANSDERMAL
Status: DISPENSED
Start: 2024-06-26

## (undated) RX ORDER — FENTANYL CITRATE 50 UG/ML
INJECTION, SOLUTION INTRAMUSCULAR; INTRAVENOUS
Status: DISPENSED
Start: 2024-06-26

## (undated) RX ORDER — PROPOFOL 10 MG/ML
INJECTION, EMULSION INTRAVENOUS
Status: DISPENSED
Start: 2024-06-26

## (undated) RX ORDER — ONDANSETRON 2 MG/ML
INJECTION INTRAMUSCULAR; INTRAVENOUS
Status: DISPENSED
Start: 2024-06-26

## (undated) RX ORDER — KETOROLAC TROMETHAMINE 30 MG/ML
INJECTION, SOLUTION INTRAMUSCULAR; INTRAVENOUS
Status: DISPENSED
Start: 2024-06-26